# Patient Record
Sex: MALE | Race: WHITE | NOT HISPANIC OR LATINO | Employment: UNEMPLOYED | URBAN - METROPOLITAN AREA
[De-identification: names, ages, dates, MRNs, and addresses within clinical notes are randomized per-mention and may not be internally consistent; named-entity substitution may affect disease eponyms.]

---

## 2020-01-10 ENCOUNTER — OFFICE VISIT (OUTPATIENT)
Dept: FAMILY MEDICINE | Facility: CLINIC | Age: 45
End: 2020-01-10
Payer: MEDICAID

## 2020-01-10 VITALS
WEIGHT: 114.81 LBS | OXYGEN SATURATION: 98 % | HEART RATE: 111 BPM | DIASTOLIC BLOOD PRESSURE: 82 MMHG | TEMPERATURE: 98 F | SYSTOLIC BLOOD PRESSURE: 138 MMHG

## 2020-01-10 DIAGNOSIS — Z23 NEED FOR DIPHTHERIA-TETANUS-PERTUSSIS (TDAP) VACCINE: ICD-10-CM

## 2020-01-10 DIAGNOSIS — R03.0 ELEVATED BLOOD PRESSURE READING IN OFFICE WITHOUT DIAGNOSIS OF HYPERTENSION: ICD-10-CM

## 2020-01-10 DIAGNOSIS — E11.9 TYPE 2 DIABETES MELLITUS WITHOUT COMPLICATION, UNSPECIFIED WHETHER LONG TERM INSULIN USE: Primary | ICD-10-CM

## 2020-01-10 DIAGNOSIS — Z13.220 ENCOUNTER FOR LIPID SCREENING FOR CARDIOVASCULAR DISEASE: ICD-10-CM

## 2020-01-10 DIAGNOSIS — Z13.6 ENCOUNTER FOR LIPID SCREENING FOR CARDIOVASCULAR DISEASE: ICD-10-CM

## 2020-01-10 PROBLEM — F17.200 TOBACCO DEPENDENCE: Status: ACTIVE | Noted: 2020-01-10

## 2020-01-10 LAB — GLUCOSE SERPL-MCNC: 456 MG/DL (ref 70–110)

## 2020-01-10 PROCEDURE — 99203 PR OFFICE/OUTPT VISIT, NEW, LEVL III, 30-44 MIN: ICD-10-PCS | Mod: S$PBB,,, | Performed by: NURSE PRACTITIONER

## 2020-01-10 PROCEDURE — 90471 IMMUNIZATION ADMIN: CPT | Mod: PBBFAC | Performed by: NURSE PRACTITIONER

## 2020-01-10 PROCEDURE — 82948 REAGENT STRIP/BLOOD GLUCOSE: CPT | Mod: PBBFAC | Performed by: NURSE PRACTITIONER

## 2020-01-10 PROCEDURE — 99203 OFFICE O/P NEW LOW 30 MIN: CPT | Mod: S$PBB,,, | Performed by: NURSE PRACTITIONER

## 2020-01-10 PROCEDURE — 99204 OFFICE O/P NEW MOD 45 MIN: CPT | Mod: 25 | Performed by: NURSE PRACTITIONER

## 2020-01-10 PROCEDURE — G0463 HOSPITAL OUTPT CLINIC VISIT: HCPCS | Mod: 25

## 2020-01-10 RX ORDER — LANCETS
EACH MISCELLANEOUS
Qty: 100 EACH | Refills: 3 | Status: SHIPPED | OUTPATIENT
Start: 2020-01-10 | End: 2020-06-04 | Stop reason: SDUPTHER

## 2020-01-10 RX ORDER — BUPRENORPHINE AND NALOXONE 8; 2 MG/1; MG/1
1 FILM, SOLUBLE BUCCAL; SUBLINGUAL DAILY
COMMUNITY

## 2020-01-10 RX ORDER — INSULIN PUMP SYRINGE, 3 ML
EACH MISCELLANEOUS
Qty: 1 EACH | Refills: 0 | Status: SHIPPED | OUTPATIENT
Start: 2020-01-10 | End: 2020-06-04

## 2020-01-10 RX ORDER — METFORMIN HYDROCHLORIDE 500 MG/1
500 TABLET ORAL 2 TIMES DAILY WITH MEALS
Qty: 60 TABLET | Refills: 3 | Status: SHIPPED | OUTPATIENT
Start: 2020-01-10 | End: 2020-01-24 | Stop reason: SDUPTHER

## 2020-01-10 NOTE — PROGRESS NOTES
"    SUBJECTIVE:      Patient ID: Sergio Porter is a 44 y.o. male.    Chief Complaint: Establish Care and Diabetes    Pt presents as a new pt for diabetes. He seems to be a poor historian. He reports he "diagnosed himself" and was never diagnosed by a medical doctor. He reports not having been to a PCP in over 20 years. Incidentally, in reviewing his [facility] chart, he has been to the hospital twice- once for dehydration and another for uncontrolled DMII when he was prescribed Metformin. He has not been taking the Metformin and never filled it. The ER notes from his visit in 2019 reports he has taken his mother's Metformin irregularly in the past. Reports he does not get GI symptoms with the Metformin. He has a blood sugar monitor at home but does not check his blood sugar regularly. Reports the last time he checked his blood sugar was 2 weeks ago and it was "around 200." He reports drinking an energy drink this morning and many sugary sodas throughout the day. He does not drink water. He does report polydipsia, but not polyphagia or polyuria. Otherwise, he reports no medical history with his only medication being suboxone which he "takes from his friends." Reports he was originally prescribed it through a clinic but he stopped going. He smokes marijuana occasionally. He is refusing the flu and pneumonia vaccines today but amenable to receiving the Tdap today with his occupation as a . Denies CP, SOB, wheezing, nausea, vomiting, diarrhea, constipation, numbness, weakness, dizziness, palpitations, fevers, or any other concerns at this time.      History reviewed. No pertinent surgical history.  Family History   Problem Relation Age of Onset    Diabetes Mother     Diabetes Father       Social History     Socioeconomic History    Marital status:      Spouse name: Not on file    Number of children: 2    Years of education: Not on file    Highest education level: Not on file   Occupational History "    Occupation:    Social Needs    Financial resource strain: Not on file    Food insecurity:     Worry: Not on file     Inability: Not on file    Transportation needs:     Medical: Not on file     Non-medical: Not on file   Tobacco Use    Smoking status: Current Every Day Smoker     Packs/day: 1.00     Years: 28.00     Pack years: 28.00     Types: Cigarettes    Smokeless tobacco: Never Used   Substance and Sexual Activity    Alcohol use: Not Currently    Drug use: Yes     Types: Marijuana     Comment: occasionally    Sexual activity: Not Currently   Lifestyle    Physical activity:     Days per week: Not on file     Minutes per session: Not on file    Stress: Not on file   Relationships    Social connections:     Talks on phone: Not on file     Gets together: Not on file     Attends Mu-ism service: Not on file     Active member of club or organization: Not on file     Attends meetings of clubs or organizations: Not on file     Relationship status: Not on file   Other Topics Concern    Not on file   Social History Narrative    Not on file     Current Outpatient Medications   Medication Sig Dispense Refill    buprenorphine-naloxone (SUBOXONE) 8-2 mg Film Place under the tongue once daily.      blood sugar diagnostic Strp To check BG 1 times daily, to use with insurance preferred meter 100 strip 3    blood-glucose meter kit To check BG 1 time daily, to use with insurance preferred meter 1 each 0    lancets Misc To check BG 1 times daily, to use with insurance preferred meter 100 each 3    metFORMIN (GLUCOPHAGE) 500 MG tablet Take 1 tablet (500 mg total) by mouth 2 (two) times daily with meals. 60 tablet 3     No current facility-administered medications for this visit.      Review of patient's allergies indicates:  No Known Allergies   History reviewed. No pertinent past medical history.  History reviewed. No pertinent surgical history.    Review of Systems   Constitutional: Negative  for activity change, appetite change, chills, diaphoresis, fatigue, fever and unexpected weight change.   HENT: Negative for congestion, ear pain, mouth sores, nosebleeds, postnasal drip, rhinorrhea, sinus pressure, sinus pain, sneezing, sore throat and trouble swallowing.    Eyes: Negative for pain and visual disturbance.   Respiratory: Negative for apnea, cough, chest tightness, shortness of breath, wheezing and stridor.    Cardiovascular: Negative for chest pain, palpitations and leg swelling.   Gastrointestinal: Negative for abdominal pain, blood in stool, constipation, diarrhea, nausea and vomiting.   Endocrine: Positive for polydipsia. Negative for polyphagia and polyuria.   Genitourinary: Negative for decreased urine volume, difficulty urinating, dysuria, flank pain, frequency, hematuria and urgency.   Musculoskeletal: Negative for arthralgias, myalgias and neck stiffness.   Skin: Negative for color change, rash and wound.   Neurological: Negative for dizziness, tremors, seizures, syncope, weakness, numbness and headaches.   Hematological: Negative for adenopathy.   Psychiatric/Behavioral: Negative for confusion, dysphoric mood, sleep disturbance and suicidal ideas. The patient is not nervous/anxious.       OBJECTIVE:      Vitals:    01/10/20 1023 01/10/20 1115   BP: (!) 138/98 138/82   BP Location: Left arm Left arm   Patient Position: Sitting Sitting   BP Method: Medium (Manual)    Pulse: (!) 111    Temp: 97.8 °F (36.6 °C)    TempSrc: Oral    SpO2: 98%    Weight: 52.1 kg (114 lb 12.8 oz)      Physical Exam   Constitutional: He is oriented to person, place, and time. Vital signs are normal. He appears well-developed and well-nourished. No distress.   HENT:   Head: Normocephalic and atraumatic.   Right Ear: Hearing, tympanic membrane, external ear and ear canal normal.   Left Ear: Hearing, tympanic membrane, external ear and ear canal normal.   Nose: Nose normal. No mucosal edema or rhinorrhea.    Mouth/Throat: Uvula is midline, oropharynx is clear and moist and mucous membranes are normal. Abnormal dentition. Dental caries present. No oropharyngeal exudate, posterior oropharyngeal edema or posterior oropharyngeal erythema.   Eyes: Pupils are equal, round, and reactive to light. Conjunctivae, EOM and lids are normal. Right eye exhibits no discharge. Left eye exhibits no discharge. No scleral icterus.   Neck: Trachea normal, normal range of motion, full passive range of motion without pain and phonation normal. Neck supple. No tracheal deviation present. No thyromegaly present.   Cardiovascular: Normal rate, regular rhythm, normal heart sounds, intact distal pulses and normal pulses. Exam reveals no gallop and no friction rub.   No murmur heard.  Pulmonary/Chest: Effort normal and breath sounds normal. No stridor. No respiratory distress. He has no decreased breath sounds. He has no wheezes. He has no rhonchi. He has no rales.   Abdominal: Soft. Normal appearance and bowel sounds are normal. There is no tenderness.   Musculoskeletal: Normal range of motion. He exhibits no edema.   Lymphadenopathy:     He has no cervical adenopathy.        Right: No supraclavicular adenopathy present.        Left: No supraclavicular adenopathy present.   Neurological: He is alert and oriented to person, place, and time.   Skin: Skin is warm, dry and intact. Capillary refill takes less than 2 seconds. No rash noted. He is not diaphoretic.   Psychiatric: He has a normal mood and affect. His speech is normal and behavior is normal. Judgment and thought content normal. Cognition and memory are normal. He expresses no suicidal plans.   Vitals reviewed.     Assessment:       1. Type 2 diabetes mellitus without complication, unspecified whether long term insulin use    2. Encounter for lipid screening for cardiovascular disease    3. Need for diphtheria-tetanus-pertussis (Tdap) vaccine        Plan:       Type 2 diabetes mellitus  without complication, unspecified whether long term insulin use  Blood sugar is elevated today in the office. He has been off his Metformin for an unknown amount of time. He is neurologically intact with no alarm symptoms present. Prescribed new diabetic supplies for him today instructing him to take fasting blood glucose daily before breakfast and record on the blood glucose log given to him today. He is to bring this log with him on his next visit. Instructed to start Metformin today and take twice daily every day compliantly. Instructed on diet - avoid white foods (sugar, potatoes, rice, sweets, breads) and no more energy drinks or sugary sodas. Instructed on one diet soda in the morning and the rest water or diet fluids. Ordered labs to be completed just prior to his next visit in 2 weeks. He was instructed to hydrate very well over this weekend and go to ER if he begins to feel bad or has any urgent symptoms. Verbalized understanding. F/U in 2 weeks.  -     POCT Glucose, Reagent Strip  -     CBC auto differential; Future; Expected date: 01/10/2020  -     Comprehensive metabolic panel; Future; Expected date: 01/10/2020  -     Microalbumin/creatinine urine ratio; Future; Expected date: 01/10/2020  -     blood-glucose meter kit; To check BG 1 time daily, to use with insurance preferred meter  Dispense: 1 each; Refill: 0  -     lancets Misc; To check BG 1 times daily, to use with insurance preferred meter  Dispense: 100 each; Refill: 3  -     blood sugar diagnostic Strp; To check BG 1 times daily, to use with insurance preferred meter  Dispense: 100 strip; Refill: 3  -     Hemoglobin A1c; Future; Expected date: 01/10/2020  -     metFORMIN (GLUCOPHAGE) 500 MG tablet; Take 1 tablet (500 mg total) by mouth 2 (two) times daily with meals.  Dispense: 60 tablet; Refill: 3    Elevated blood pressure reading in office without diagnosis of hypertension  His BP is above target at this office visit today. Discussed elevation  of BP with excess use of caffeine and tobacco use. Instructed on watching the amount of salt in the diet. Instructed on proper hydration with decaffeinated products, like Crystal Light or water. No more energy drinks. Will continue to monitor.    Encounter for lipid screening for cardiovascular disease  -     Lipid panel; Future; Expected date: 01/10/2020    Need for diphtheria-tetanus-pertussis (Tdap) vaccine  -     Tdap Vaccine        Follow up in about 2 weeks (around 1/24/2020) for F/U DM and lab review.      1/10/2020 Swapna Stafford, JOSE RAUL, FNP

## 2020-01-10 NOTE — PATIENT INSTRUCTIONS
Understanding Carbohydrates, Fats, and Protein  Food is a source of fuel and nourishment for your body. Its also a source of pleasure. Having diabetes doesnt mean you have to eat special foods or give up desserts. Instead, your dietitian can show you how to plan meals to suit your body. To start, learn how different foods affect blood sugar.  Carbohydrates  Carbohydrates are the main source of fuel for the body. Carbohydrates raise blood sugar. Many people think carbohydrates are only found in pasta or bread. But carbohydrates are actually in many kinds of foods:  · Sugars occur naturally in foods such as fruit, milk, honey, and molasses. Sugars can also be added to many foods, from cereals and yogurt to candy and desserts. Sugars raise blood sugar.  · Starches are found in bread, cereals, pasta, and dried beans. Theyre also found in corn, peas, potatoes, yam, acorn squash, and butternut squash. Starches also raise blood sugar.   · Fiber is found in foods such as vegetables, fruits, beans, and whole grains. Unlike other carbs, fiber isnt digested or absorbed. So it doesnt raise blood sugar. In fact, fiber can help keep blood sugar from rising too fast. It also helps keep blood cholesterol at a healthy level.  Did you know?  Even though carbohydrates raise blood sugar, its best to have some in every meal. They are an important part of a healthy diet.   Fat  Fat is an energy source that can be stored until needed. Fat does not raise blood sugar. However, it can raise blood cholesterol, increasing the risk of heart disease. Fat is also high in calories, which can cause weight gain. Not all types of fat are the same.  More Healthy:  · Monounsaturated fats are mostly found in vegetable oils, such as olive, canola, and peanut oils. They are also found in avocados and some nuts. Monounsaturated fats are healthy for your heart. Thats because they lower LDL (unhealthy) cholesterol.  · Polyunsaturated fats are mostly  found in vegetable oils, such as corn, safflower, and soybean oils. They are also found in some seeds, nuts, and fish. Polyunsaturated fats lower LDL (unhealthy) cholesterol. So, choosing them instead of saturated fats is healthy for your heart. Certain unsaturated fats can help lower triglycerides.   Less Healthy:  · Saturated fats are found in animal products, such as meat, poultry, whole milk, lard, and butter. Saturated fats raise LDL cholesterol and are not healthy for your heart.  · Hydrogenated oils and trans fats are formed when vegetable oils are processed into solid fats. They are found in many processed foods. Hydrogenated oils and trans fats raise LDL cholesterol and lower HDL (healthy) cholesterol. They are not healthy for your heart.  Protein  Protein helps the body build and repair muscle and other tissue. Protein has little or no effect on blood sugar. However, many foods that contain protein also contain saturated fat. By choosing low-fat protein sources, you can get the benefits of protein without the extra fat:  · Plant protein is found in dry beans and peas, nuts, and soy products, such as tofu and soymilk. These sources tend to be cholesterol-free and low in saturated fat.  · Animal protein is found in fish, poultry, meat, cheese, milk, and eggs. These contain cholesterol and can be high in saturated fat. Aim for lean, lower-fat choices.  Date Last Reviewed: 3/1/2016  © 3476-9049 [employer]. [address] [place] [address], [place] [address]. All rights reserved. This information is not intended as a substitute for professional medical care. Always follow your healthcare professional's instructions.

## 2020-01-17 ENCOUNTER — TELEPHONE (OUTPATIENT)
Dept: FAMILY MEDICINE | Facility: CLINIC | Age: 45
End: 2020-01-17

## 2020-01-21 ENCOUNTER — TELEPHONE (OUTPATIENT)
Dept: FAMILY MEDICINE | Facility: CLINIC | Age: 45
End: 2020-01-21

## 2020-01-21 NOTE — TELEPHONE ENCOUNTER
Tried to call pt to remind him of labs due before his appointment, however there was no answer and unable to leave message as mailbox was full.

## 2020-01-22 ENCOUNTER — LAB VISIT (OUTPATIENT)
Dept: LAB | Facility: HOSPITAL | Age: 45
End: 2020-01-22
Attending: NURSE PRACTITIONER
Payer: MEDICAID

## 2020-01-22 DIAGNOSIS — Z13.6 ENCOUNTER FOR LIPID SCREENING FOR CARDIOVASCULAR DISEASE: ICD-10-CM

## 2020-01-22 DIAGNOSIS — E11.9 TYPE 2 DIABETES MELLITUS WITHOUT COMPLICATION, UNSPECIFIED WHETHER LONG TERM INSULIN USE: ICD-10-CM

## 2020-01-22 DIAGNOSIS — Z13.220 ENCOUNTER FOR LIPID SCREENING FOR CARDIOVASCULAR DISEASE: ICD-10-CM

## 2020-01-22 LAB
ALBUMIN SERPL BCP-MCNC: 3.7 G/DL (ref 3.5–5.2)
ALBUMIN/CREAT UR: 9 UG/MG (ref 0–30)
ALP SERPL-CCNC: 109 U/L (ref 55–135)
ALT SERPL W/O P-5'-P-CCNC: 23 U/L (ref 10–44)
ANION GAP SERPL CALC-SCNC: 11 MMOL/L (ref 8–16)
AST SERPL-CCNC: 17 U/L (ref 10–40)
BASOPHILS # BLD AUTO: 0.05 K/UL (ref 0–0.2)
BASOPHILS NFR BLD: 0.6 % (ref 0–1.9)
BILIRUB SERPL-MCNC: 0.6 MG/DL (ref 0.1–1)
BUN SERPL-MCNC: 12 MG/DL (ref 6–20)
CALCIUM SERPL-MCNC: 9.7 MG/DL (ref 8.7–10.5)
CHLORIDE SERPL-SCNC: 97 MMOL/L (ref 95–110)
CHOLEST SERPL-MCNC: 212 MG/DL (ref 120–199)
CHOLEST/HDLC SERPL: 3.5 {RATIO} (ref 2–5)
CO2 SERPL-SCNC: 27 MMOL/L (ref 23–29)
CREAT SERPL-MCNC: 0.7 MG/DL (ref 0.5–1.4)
CREAT UR-MCNC: 52 MG/DL (ref 23–375)
DIFFERENTIAL METHOD: ABNORMAL
EOSINOPHIL # BLD AUTO: 0.2 K/UL (ref 0–0.5)
EOSINOPHIL NFR BLD: 2 % (ref 0–8)
ERYTHROCYTE [DISTWIDTH] IN BLOOD BY AUTOMATED COUNT: 11.9 % (ref 11.5–14.5)
EST. GFR  (AFRICAN AMERICAN): >60 ML/MIN/1.73 M^2
EST. GFR  (NON AFRICAN AMERICAN): >60 ML/MIN/1.73 M^2
ESTIMATED AVG GLUCOSE: 378 MG/DL (ref 68–131)
GLUCOSE SERPL-MCNC: 329 MG/DL (ref 70–110)
HBA1C MFR BLD HPLC: 14.8 % (ref 4.5–6.2)
HCT VFR BLD AUTO: 46.4 % (ref 40–54)
HDLC SERPL-MCNC: 61 MG/DL (ref 40–75)
HDLC SERPL: 28.8 % (ref 20–50)
HGB BLD-MCNC: 15.1 G/DL (ref 14–18)
IMM GRANULOCYTES # BLD AUTO: 0.02 K/UL (ref 0–0.04)
IMM GRANULOCYTES NFR BLD AUTO: 0.3 % (ref 0–0.5)
LDLC SERPL CALC-MCNC: 134.4 MG/DL (ref 63–159)
LYMPHOCYTES # BLD AUTO: 2.8 K/UL (ref 1–4.8)
LYMPHOCYTES NFR BLD: 35.4 % (ref 18–48)
MCH RBC QN AUTO: 29.1 PG (ref 27–31)
MCHC RBC AUTO-ENTMCNC: 32.5 G/DL (ref 32–36)
MCV RBC AUTO: 89 FL (ref 82–98)
MICROALBUMIN UR DL<=1MG/L-MCNC: 4.7 UG/ML
MONOCYTES # BLD AUTO: 0.6 K/UL (ref 0.3–1)
MONOCYTES NFR BLD: 7.1 % (ref 4–15)
NEUTROPHILS # BLD AUTO: 4.3 K/UL (ref 1.8–7.7)
NEUTROPHILS NFR BLD: 54.6 % (ref 38–73)
NONHDLC SERPL-MCNC: 151 MG/DL
NRBC BLD-RTO: 0 /100 WBC
PLATELET # BLD AUTO: 304 K/UL (ref 150–350)
PMV BLD AUTO: 8.9 FL (ref 9.2–12.9)
POTASSIUM SERPL-SCNC: 4.4 MMOL/L (ref 3.5–5.1)
PROT SERPL-MCNC: 7.5 G/DL (ref 6–8.4)
RBC # BLD AUTO: 5.19 M/UL (ref 4.6–6.2)
SODIUM SERPL-SCNC: 135 MMOL/L (ref 136–145)
TRIGL SERPL-MCNC: 83 MG/DL (ref 30–150)
WBC # BLD AUTO: 7.93 K/UL (ref 3.9–12.7)

## 2020-01-22 PROCEDURE — 83036 HEMOGLOBIN GLYCOSYLATED A1C: CPT

## 2020-01-22 PROCEDURE — 80061 LIPID PANEL: CPT

## 2020-01-22 PROCEDURE — 85025 COMPLETE CBC W/AUTO DIFF WBC: CPT

## 2020-01-22 PROCEDURE — 36415 COLL VENOUS BLD VENIPUNCTURE: CPT

## 2020-01-22 PROCEDURE — 80053 COMPREHEN METABOLIC PANEL: CPT

## 2020-01-22 PROCEDURE — 82043 UR ALBUMIN QUANTITATIVE: CPT

## 2020-01-24 ENCOUNTER — OFFICE VISIT (OUTPATIENT)
Dept: FAMILY MEDICINE | Facility: CLINIC | Age: 45
End: 2020-01-24
Payer: MEDICAID

## 2020-01-24 VITALS
HEIGHT: 67 IN | OXYGEN SATURATION: 96 % | TEMPERATURE: 98 F | WEIGHT: 116.19 LBS | SYSTOLIC BLOOD PRESSURE: 128 MMHG | BODY MASS INDEX: 18.24 KG/M2 | HEART RATE: 86 BPM | DIASTOLIC BLOOD PRESSURE: 78 MMHG

## 2020-01-24 DIAGNOSIS — E11.9 TYPE 2 DIABETES MELLITUS WITHOUT COMPLICATION, UNSPECIFIED WHETHER LONG TERM INSULIN USE: Primary | ICD-10-CM

## 2020-01-24 DIAGNOSIS — E78.5 HYPERLIPIDEMIA, UNSPECIFIED HYPERLIPIDEMIA TYPE: ICD-10-CM

## 2020-01-24 DIAGNOSIS — R03.0 ELEVATED BLOOD PRESSURE READING IN OFFICE WITHOUT DIAGNOSIS OF HYPERTENSION: ICD-10-CM

## 2020-01-24 PROCEDURE — 99215 OFFICE O/P EST HI 40 MIN: CPT | Performed by: NURSE PRACTITIONER

## 2020-01-24 PROCEDURE — 99213 OFFICE O/P EST LOW 20 MIN: CPT | Mod: S$PBB,,, | Performed by: NURSE PRACTITIONER

## 2020-01-24 PROCEDURE — 99213 PR OFFICE/OUTPT VISIT, EST, LEVL III, 20-29 MIN: ICD-10-PCS | Mod: S$PBB,,, | Performed by: NURSE PRACTITIONER

## 2020-01-24 RX ORDER — INSULIN GLARGINE 100 [IU]/ML
10 INJECTION, SOLUTION SUBCUTANEOUS NIGHTLY
Qty: 1 BOX | Refills: 3 | Status: SHIPPED | OUTPATIENT
Start: 2020-01-24 | End: 2020-06-04

## 2020-01-24 RX ORDER — ATORVASTATIN CALCIUM 10 MG/1
10 TABLET, FILM COATED ORAL NIGHTLY
Qty: 90 TABLET | Refills: 0 | Status: SHIPPED | OUTPATIENT
Start: 2020-01-24 | End: 2020-06-04

## 2020-01-24 RX ORDER — BLOOD SUGAR DIAGNOSTIC
1 STRIP MISCELLANEOUS DAILY
Qty: 100 EACH | Refills: 2 | Status: SHIPPED | OUTPATIENT
Start: 2020-01-24 | End: 2020-06-04

## 2020-01-24 RX ORDER — METFORMIN HYDROCHLORIDE 500 MG/1
1000 TABLET ORAL 2 TIMES DAILY WITH MEALS
Qty: 60 TABLET | Refills: 3 | Status: SHIPPED | OUTPATIENT
Start: 2020-01-24 | End: 2020-06-04 | Stop reason: SDUPTHER

## 2020-01-24 NOTE — PROGRESS NOTES
SUBJECTIVE:      Patient ID: Sergio Porter is a 44 y.o. male.    Chief Complaint: Follow-up (2 weeks / lab review)    Pt presents for lab review and blood sugar review. Pt has been doing well since last visit. He seems to be very motivated to turn his health around. He has been compliant with his Metformin. He has been compliant with his blood sugar checks daily and then having been running mostly in the 250s-280s which is better than previously, with some outliers in the 300s still. He reports compliance with his diet, watching his salt intake to avoid HTN and watching his sugars and carbs. Denies GI side effects from his Metformin. We reviewed his labs today which showed minimal hyperlipidemia, hyperglycemia, and HgbA1c 14%. He reports he has been feeling a lot better since being more compliant with his treatment plan. Reports he has not had an eye exam in a while and has never had a foot exam. He is refusing his vaccinations at this time. Denies CP, SOB, wheezing, diarrhea, constipation, nausea, vomiting, dizziness, weakness, numbness, palpitations, fevers, or any other concerns at this time.      History reviewed. No pertinent surgical history.  Family History   Problem Relation Age of Onset    Diabetes Mother     Diabetes Father       Social History     Socioeconomic History    Marital status:      Spouse name: Not on file    Number of children: 2    Years of education: Not on file    Highest education level: Not on file   Occupational History    Occupation:    Social Needs    Financial resource strain: Not on file    Food insecurity:     Worry: Not on file     Inability: Not on file    Transportation needs:     Medical: Not on file     Non-medical: Not on file   Tobacco Use    Smoking status: Current Every Day Smoker     Packs/day: 1.00     Years: 28.00     Pack years: 28.00     Types: Cigarettes    Smokeless tobacco: Never Used   Substance and Sexual Activity     "Alcohol use: Not Currently    Drug use: Yes     Types: Marijuana     Comment: occasionally    Sexual activity: Not Currently   Lifestyle    Physical activity:     Days per week: Not on file     Minutes per session: Not on file    Stress: Not on file   Relationships    Social connections:     Talks on phone: Not on file     Gets together: Not on file     Attends Synagogue service: Not on file     Active member of club or organization: Not on file     Attends meetings of clubs or organizations: Not on file     Relationship status: Not on file   Other Topics Concern    Not on file   Social History Narrative    Not on file     Current Outpatient Medications   Medication Sig Dispense Refill    blood sugar diagnostic Strp To check BG 1 times daily, to use with insurance preferred meter 100 strip 3    blood-glucose meter kit To check BG 1 time daily, to use with insurance preferred meter 1 each 0    buprenorphine-naloxone (SUBOXONE) 8-2 mg Film Place under the tongue once daily.      lancets Misc To check BG 1 times daily, to use with insurance preferred meter 100 each 3    metFORMIN (GLUCOPHAGE) 500 MG tablet Take 2 tablets (1,000 mg total) by mouth 2 (two) times daily with meals. 60 tablet 3    atorvastatin (LIPITOR) 10 MG tablet Take 1 tablet (10 mg total) by mouth every evening. 90 tablet 0    insulin (LANTUS SOLOSTAR U-100 INSULIN) glargine 100 units/mL (3mL) SubQ pen Inject 10 Units into the skin every evening. 1 Box 3    pen needle, diabetic (BD ULTRA-FINE MICRO PEN NEEDLE) 32 gauge x 1/4" Ndle Inject 1 Device into the skin once daily. 100 each 2     No current facility-administered medications for this visit.      Review of patient's allergies indicates:  No Known Allergies   Past Medical History:   Diagnosis Date    Diabetes mellitus, type 2      History reviewed. No pertinent surgical history.    Review of Systems   Constitutional: Negative for activity change, appetite change, chills, fatigue, " "fever and unexpected weight change.   HENT: Negative for congestion, ear pain, mouth sores, nosebleeds, postnasal drip, rhinorrhea, sinus pressure, sinus pain, sneezing, sore throat and trouble swallowing.    Eyes: Negative for pain and visual disturbance.   Respiratory: Negative for apnea, cough, chest tightness, shortness of breath, wheezing and stridor.    Cardiovascular: Negative for chest pain, palpitations and leg swelling.   Gastrointestinal: Negative for abdominal pain, blood in stool, constipation, diarrhea, nausea and vomiting.   Genitourinary: Negative for dysuria, flank pain, frequency and hematuria.   Musculoskeletal: Negative for arthralgias, myalgias and neck stiffness.   Skin: Negative for color change, rash and wound.   Neurological: Negative for dizziness, tremors, seizures, syncope and headaches.   Hematological: Negative for adenopathy.   Psychiatric/Behavioral: Negative for confusion, sleep disturbance and suicidal ideas.      OBJECTIVE:      Vitals:    01/24/20 1015   BP: 128/78   BP Location: Left arm   Patient Position: Sitting   BP Method: Medium (Manual)   Pulse: 86   Temp: 97.8 °F (36.6 °C)   TempSrc: Oral   SpO2: 96%   Weight: 52.7 kg (116 lb 3.2 oz)   Height: 5' 7" (1.702 m)     Physical Exam   Constitutional: He is oriented to person, place, and time. Vital signs are normal. He appears well-developed and well-nourished. No distress.   HENT:   Head: Normocephalic and atraumatic.   Right Ear: Hearing and external ear normal.   Left Ear: Hearing and external ear normal.   Nose: Nose normal.   Mouth/Throat: Uvula is midline, oropharynx is clear and moist and mucous membranes are normal. Abnormal dentition. No oropharyngeal exudate.   Eyes: Pupils are equal, round, and reactive to light. Conjunctivae, EOM and lids are normal. Right eye exhibits no discharge. Left eye exhibits no discharge. No scleral icterus.   Neck: Trachea normal, normal range of motion, full passive range of motion " without pain and phonation normal. Neck supple. No tracheal deviation present.   Cardiovascular: Normal rate, regular rhythm, normal heart sounds, intact distal pulses and normal pulses. Exam reveals no gallop and no friction rub.   No murmur heard.  Pulmonary/Chest: Effort normal and breath sounds normal. No stridor. No respiratory distress. He has no decreased breath sounds. He has no wheezes. He has no rhonchi. He has no rales.   Abdominal: Soft. Normal appearance and bowel sounds are normal. There is no tenderness.   Musculoskeletal: Normal range of motion. He exhibits no edema.   Lymphadenopathy:     He has no cervical adenopathy.        Right: No supraclavicular adenopathy present.        Left: No supraclavicular adenopathy present.   Neurological: He is alert and oriented to person, place, and time.   Skin: Skin is warm, dry and intact. Capillary refill takes less than 2 seconds. No rash noted. He is not diaphoretic.   Psychiatric: He has a normal mood and affect. His speech is normal and behavior is normal. Judgment and thought content normal. Cognition and memory are normal. He expresses no suicidal plans.   Vitals reviewed.     Assessment:       1. Type 2 diabetes mellitus without complication, unspecified whether long term insulin use    2. Elevated blood pressure reading in office without diagnosis of hypertension    3. Hyperlipidemia, unspecified hyperlipidemia type        Plan:       Type 2 diabetes mellitus without complication, unspecified whether long term insulin use  Increasing his Metformin to 1000mg BID since he is tolerating well with no side effects reported. Also starting on Lantus 10units every evening. Instructed on symptoms of hyper/hypoglycemia and the interventions for both should they arise. He is deferring visit with pharmacist at this time which was recommended. Referrals placed for both podiatry and ophthalmology for eye and foot exam. Instructed to call in weekly with his blood  "sugars for further titration of his Lantus. F/U in   -     metFORMIN (GLUCOPHAGE) 500 MG tablet; Take 2 tablets (1,000 mg total) by mouth 2 (two) times daily with meals.  Dispense: 60 tablet; Refill: 3  -     atorvastatin (LIPITOR) 10 MG tablet; Take 1 tablet (10 mg total) by mouth every evening.  Dispense: 90 tablet; Refill: 0  -     insulin (LANTUS SOLOSTAR U-100 INSULIN) glargine 100 units/mL (3mL) SubQ pen; Inject 10 Units into the skin every evening.  Dispense: 1 Box; Refill: 3  -     pen needle, diabetic (BD ULTRA-FINE MICRO PEN NEEDLE) 32 gauge x 1/4" Ndle; Inject 1 Device into the skin once daily.  Dispense: 100 each; Refill: 2  -     Ambulatory Referral to Ophthalmology  -     Ambulatory referral to Podiatry    Elevated blood pressure reading in office without diagnosis of hypertension  BP controlled at this visit. Instructed to continue with proper hydration, with minimal caffeine use and minimal salt use. Continue to monitor.    Hyperlipidemia, unspecified hyperlipidemia type  He has a family history of heart disease. Reviewed his calculated 10-year risk of CVD which is 8.21% which is an increased risk at this time due to his cigarette smoking and diabetes. Will start Lipitor 10mg orally every evening. Instructed on decreasing amount of saturated fats in his diet and starting with an exercise program of approx 150 minutes of brisk activity per week. Instructed on side effect of myalgia and he will contact me if this occurs. Will draw labs in 3 months.  -     atorvastatin (LIPITOR) 10 MG tablet; Take 1 tablet (10 mg total) by mouth every evening.  Dispense: 90 tablet; Refill: 0        Follow up in about 3 months (around 4/24/2020) for F/U DM.      1/24/2020 Swapna Stafford, JOSE RAUL, FNP      "

## 2020-01-24 NOTE — PATIENT INSTRUCTIONS
"  Insulin: How to Use and Where to Inject     Injection sites in adults include the belly (abdomen), front of thighs, back of upper arms and upper buttocks.     Insulin is given with shots (injections) in the fatty layer under the skin (subcutaneous). Some people use an implanted device called an insulin pump, while others inject insulin using prefilled "pens." Your healthcare provider, nurse, diabetes educator, or others will give you instructions about using insulin. Make sure you follow all instructions about when and where you use it.  Where to inject your insulin  · Injecting insulin in the same area (like your belly) means that insulin is absorbed the same way.  · Insulin is most often injected in the abdominal fat. That is where it is absorbed most quickly.  · Change the injection site each time you give yourself insulin. This helps prevent problems.  · Have an organized way of moving from site to site.  · Leave at least 2 inches around your belly button (navel).  When to inject your insulin  · Make sure you follow your healthcare provider's instructions about when you should give yourself insulin.  · The timing of insulin injections with meals or snacks is very important.      Getting ready to inject insulin from a bottle  · Wash your hands. Use soap and warm water.  · Wipe the top of the insulin bottle (vial) with alcohol.  Preparing the insulin  · Pull back the plunger until the end of the plunger is even with the number of units of insulin you take. Always read the numbers of units of insulin at your eye level.   · Insert the needle into the top of the bottle. Hold the needle and bottle straight up and down. Make sure the needle is in the insulin. Then push the plunger in all the way, pushing air into the insulin.  · Turn the bottle and syringe upside down so that the bottle is on top.  · Pull back on the plunger until the end of the plunger is even with the number of units of insulin you take.  · Remove " the needle. Then tap the syringe with a fingertip to remove any air bubbles.  Injecting the insulin  · Gently pinch up about 1 inch of skin. Do not squeeze the skin.  · Insert the needle.  · Push in the plunger. Press until the syringe is empty. Let go of the skin. Then remove the needle. Dont rub the site after you remove the needle.  Disposing of the syringe  · Put the needle and syringe in a sharps container. And dont recap the needle.  · When the sharps container is full, put it into a garbage bag and secure the top. Label the bag needles or sharps.  · Call your local waste company to ask about removing the sharps container. You can also check with the [employer] Needle Disposal at 428-897-0257, www.safeneedledisposal.org.  Storing your insulin  · Keep unopened insulin bottles in the refrigerator. An open bottle can be stored at room temperature (such as on the kitchen counter). But dont let the insulin get too hot. Always keep it below 86°F (30°C). And never let it freeze!  · Always use insulin before the expiration date on the bottle. Throw  bottles away.  · Use insulin within 28 days of opening the bottle. After 28 days, throw it away. To remember, write the date you opened it on the bottle.  · When you travel, be sure to take all of your diabetes supplies. Put them in a bag made to protect insulin from heat and cold. Always keep them with you. If there is a delay or your suitcase is lost, you will have what you need.  · Never leave insulin in the car. It can get too hot or too cold.  Date Last Reviewed: 2016  © 5111-1742 [employer]. [address] [place] [address], [place] [address]. All rights reserved. This information is not intended as a substitute for professional medical care. Always follow your healthcare professional's instructions.

## 2020-01-26 NOTE — PROGRESS NOTES
Reviewed labs during visit. Metformin increased to 1000mg BID and started on Lantus 10units daily. He will call weekly with his fasting blood sugars for titration of insulin. Also started on Lipitor with increased 10-year calculated risk of CVD.

## 2020-06-04 ENCOUNTER — OFFICE VISIT (OUTPATIENT)
Dept: FAMILY MEDICINE | Facility: CLINIC | Age: 45
End: 2020-06-04
Payer: MEDICAID

## 2020-06-04 VITALS
SYSTOLIC BLOOD PRESSURE: 126 MMHG | BODY MASS INDEX: 17.97 KG/M2 | HEART RATE: 111 BPM | WEIGHT: 114.5 LBS | OXYGEN SATURATION: 92 % | HEIGHT: 67 IN | DIASTOLIC BLOOD PRESSURE: 58 MMHG | TEMPERATURE: 98 F

## 2020-06-04 DIAGNOSIS — E11.65 UNCONTROLLED TYPE 2 DIABETES MELLITUS WITH HYPERGLYCEMIA: Primary | ICD-10-CM

## 2020-06-04 PROCEDURE — 99213 PR OFFICE/OUTPT VISIT, EST, LEVL III, 20-29 MIN: ICD-10-PCS | Mod: S$PBB,,, | Performed by: NURSE PRACTITIONER

## 2020-06-04 PROCEDURE — 99213 OFFICE O/P EST LOW 20 MIN: CPT | Mod: S$PBB,,, | Performed by: NURSE PRACTITIONER

## 2020-06-04 PROCEDURE — 99214 OFFICE O/P EST MOD 30 MIN: CPT | Performed by: NURSE PRACTITIONER

## 2020-06-04 RX ORDER — LANCETS
EACH MISCELLANEOUS
Qty: 100 EACH | Refills: 3 | Status: ON HOLD | OUTPATIENT
Start: 2020-06-04 | End: 2022-09-16 | Stop reason: HOSPADM

## 2020-06-04 RX ORDER — METFORMIN HYDROCHLORIDE 500 MG/1
1000 TABLET ORAL 2 TIMES DAILY WITH MEALS
Qty: 60 TABLET | Refills: 3 | Status: SHIPPED | OUTPATIENT
Start: 2020-06-04 | End: 2021-07-09 | Stop reason: SDUPTHER

## 2020-06-04 RX ORDER — ATORVASTATIN CALCIUM 10 MG/1
10 TABLET, FILM COATED ORAL NIGHTLY
Qty: 90 TABLET | Refills: 2 | Status: SHIPPED | OUTPATIENT
Start: 2020-06-04 | End: 2021-07-09 | Stop reason: SDUPTHER

## 2020-06-04 NOTE — PROGRESS NOTES
SUBJECTIVE:      Patient ID: Sergio Porter is a 45 y.o. male.    Chief Complaint: Follow-up (DM )    Patient presents for follow-up of his diabetes.  His wife is on Facetime during the visit so she can hear and understand the plan of care.  Reports he has not been complying with care and his blood sugars are elevated with blood glucose monitor reading high.  He is unsure why he stopped treatment.  Reports he has taken his metformin 2 tablets once daily for the last 4 days.  Of note, he is supposed to be taking metformin 2 tablets twice daily.  He reports he stopped the Lantus insulin because it was burning him when he injected.  He has been using Levemir which was unused and found at his home.  For the last 4 days he has been taking 10 units of Levemir once nightly.  Reports he has been losing weight because he does not know what to eat for his diabetes and he does not like the food that he is presented with.  He does report blurry vision and polyuria since his blood sugars have been running high.  He does report weight loss.  He has been noncompliant with his monitoring regimen.  He has never seen a dietitian.  Referrals were placed 2 visits ago for ophthalmology and podiatry and he has not followed up on this. Denies CP, SOB, wheezing, fevers, nausea, vomiting, diarrhea, constipation, numbness, weakness, dizziness, palpitations, or any other concerns at this time.      Diabetes   He presents for his follow-up diabetic visit. He has type 2 diabetes mellitus. His disease course has been worsening. There are no hypoglycemic associated symptoms. Pertinent negatives for hypoglycemia include no confusion, dizziness, headaches, seizures or tremors. Associated symptoms include blurred vision, polyuria and weight loss. Pertinent negatives for diabetes include no chest pain, no fatigue, no foot paresthesias, no foot ulcerations, no polydipsia, no polyphagia, no visual change and no weakness. There are no hypoglycemic  complications. Symptoms are worsening. There are no diabetic complications. Risk factors for coronary artery disease include diabetes mellitus, family history, male sex and tobacco exposure. Current diabetic treatment includes insulin injections and oral agent (monotherapy). He is compliant with treatment none of the time. He is currently taking insulin at bedtime. Insulin injections are given by patient. Rotation sites for injection include the abdominal wall. His weight is decreasing steadily. He is following a generally unhealthy diet. When asked about meal planning, he reported none. He has not had a previous visit with a dietitian. He monitors blood glucose at home 1-2 x per day. There is no compliance with monitoring of blood glucose. His home blood glucose trend is increasing rapidly. An ACE inhibitor/angiotensin II receptor blocker is not being taken. He does not see a podiatrist.Eye exam is not current.       History reviewed. No pertinent surgical history.  Family History   Problem Relation Age of Onset    Diabetes Mother     Diabetes Father       Social History     Socioeconomic History    Marital status:      Spouse name: Not on file    Number of children: 2    Years of education: Not on file    Highest education level: Not on file   Occupational History    Occupation:    Social Needs    Financial resource strain: Not on file    Food insecurity:     Worry: Not on file     Inability: Not on file    Transportation needs:     Medical: Not on file     Non-medical: Not on file   Tobacco Use    Smoking status: Current Every Day Smoker     Packs/day: 1.00     Years: 28.00     Pack years: 28.00     Types: Cigarettes    Smokeless tobacco: Never Used   Substance and Sexual Activity    Alcohol use: Not Currently    Drug use: Yes     Types: Marijuana     Comment: occasionally    Sexual activity: Not Currently   Lifestyle    Physical activity:     Days per week: Not on file      "Minutes per session: Not on file    Stress: Not on file   Relationships    Social connections:     Talks on phone: Not on file     Gets together: Not on file     Attends Denominational service: Not on file     Active member of club or organization: Not on file     Attends meetings of clubs or organizations: Not on file     Relationship status: Not on file   Other Topics Concern    Not on file   Social History Narrative    Not on file     Current Outpatient Medications   Medication Sig Dispense Refill    buprenorphine-naloxone (SUBOXONE) 8-2 mg Film Place under the tongue once daily.      lancets Mis To check BG 1 times daily, to use with insurance preferred meter 100 each 3    metFORMIN (GLUCOPHAGE) 500 MG tablet Take 2 tablets (1,000 mg total) by mouth 2 (two) times daily with meals. 60 tablet 3    atorvastatin (LIPITOR) 10 MG tablet Take 1 tablet (10 mg total) by mouth every evening. 90 tablet 2    insulin detemir U-100 (LEVEMIR FLEXTOUCH U-100 INSULN) 100 unit/mL (3 mL) SubQ InPn pen Inject 10 Units into the skin every evening. 1 Box 3    pen needle, diabetic (NOVOFINE PLUS) 32 gauge x 1/6" Ndle 1 Device by Misc.(Non-Drug; Combo Route) route every evening. 100 each 3     No current facility-administered medications for this visit.      Review of patient's allergies indicates:  No Known Allergies   Past Medical History:   Diagnosis Date    Diabetes mellitus, type 2      History reviewed. No pertinent surgical history.    Review of Systems   Constitutional: Positive for weight loss. Negative for activity change, appetite change, chills, fatigue, fever and unexpected weight change.   HENT: Negative for congestion, ear pain, mouth sores, nosebleeds, postnasal drip, rhinorrhea, sinus pressure, sinus pain, sneezing, sore throat and trouble swallowing.    Eyes: Positive for blurred vision. Negative for pain and visual disturbance.   Respiratory: Negative for apnea, cough, chest tightness, shortness of breath, " "wheezing and stridor.    Cardiovascular: Negative for chest pain, palpitations and leg swelling.   Gastrointestinal: Negative for abdominal pain, blood in stool, constipation, diarrhea, nausea and vomiting.   Endocrine: Positive for polyuria. Negative for polydipsia and polyphagia.   Genitourinary: Negative for dysuria, flank pain, frequency and hematuria.   Musculoskeletal: Negative for arthralgias, myalgias and neck stiffness.   Skin: Negative for color change, rash and wound.   Neurological: Negative for dizziness, tremors, seizures, syncope, weakness, light-headedness, numbness and headaches.   Hematological: Negative for adenopathy.   Psychiatric/Behavioral: Negative for confusion, sleep disturbance and suicidal ideas.      OBJECTIVE:      Vitals:    06/04/20 1429   BP: (!) 126/58   BP Location: Left arm   Patient Position: Sitting   BP Method: Medium (Manual)   Pulse: (!) 111   Temp: 98.1 °F (36.7 °C)   TempSrc: Oral   SpO2: (!) 92%   Weight: 51.9 kg (114 lb 8 oz)   Height: 5' 7" (1.702 m)     Physical Exam   Constitutional: He is oriented to person, place, and time. He appears well-developed and well-nourished. No distress.   HENT:   Head: Normocephalic and atraumatic.   Right Ear: Hearing and external ear normal.   Left Ear: Hearing and external ear normal.   Nose: Nose normal.   Mouth/Throat: Uvula is midline, oropharynx is clear and moist and mucous membranes are normal. Abnormal dentition. No oropharyngeal exudate.   Eyes: Pupils are equal, round, and reactive to light. Conjunctivae, EOM and lids are normal. Right eye exhibits no discharge. Left eye exhibits no discharge. No scleral icterus.   Neck: Trachea normal, normal range of motion, full passive range of motion without pain and phonation normal. Neck supple. Carotid bruit is not present. No tracheal deviation present. No thyromegaly present.   Cardiovascular: Regular rhythm, normal heart sounds, intact distal pulses and normal pulses. Tachycardia " present. Exam reveals no gallop and no friction rub.   No murmur heard.  Pulmonary/Chest: Effort normal and breath sounds normal. No stridor. No respiratory distress. He has no decreased breath sounds. He has no wheezes. He has no rhonchi. He has no rales.   Abdominal: Soft. Normal appearance and bowel sounds are normal. There is no tenderness.   Musculoskeletal: Normal range of motion. He exhibits no edema.   Lymphadenopathy:     He has no cervical adenopathy.        Right: No supraclavicular adenopathy present.        Left: No supraclavicular adenopathy present.   Neurological: He is alert and oriented to person, place, and time.   Skin: Skin is warm, dry and intact. Capillary refill takes less than 2 seconds. No rash noted. He is not diaphoretic.   Psychiatric: He has a normal mood and affect. His speech is normal and behavior is normal. Judgment and thought content normal. Cognition and memory are normal. He expresses no suicidal plans.   Vitals reviewed.     Assessment:       1. Uncontrolled type 2 diabetes mellitus with hyperglycemia    2. BMI less than 19,adult        Plan:       Uncontrolled type 2 diabetes mellitus with hyperglycemia  He was instructed to maintain compliance with treatment regimen.  Metformin 2 tablets b.i.d..  Will continue on Levemir since he prefers this insulin- 10 units once nightly.  Instructed on compliance of monitoring regimen.  He will keep a log and contact me weekly with his fasting blood sugars so titration of his Levemir can take place.  He was reminded of the referrals placed to both Ophthalmology and Podiatry and he will make appointments with both.  He will return in 2 weeks with labs.  Instructed if at any time he feels bad in his blood sugars are high, he should present to the ER.  -     Ambulatory referral/consult to Nutrition Services; Future; Expected date: 06/11/2020  -     Comprehensive metabolic panel; Future; Expected date: 06/15/2020  -     Hemoglobin A1C; Future;  "Expected date: 06/15/2020  -     Lipid Panel; Future; Expected date: 06/15/2020  -     insulin detemir U-100 (LEVEMIR FLEXTOUCH U-100 INSULN) 100 unit/mL (3 mL) SubQ InPn pen; Inject 10 Units into the skin every evening.  Dispense: 1 Box; Refill: 3  -     metFORMIN (GLUCOPHAGE) 500 MG tablet; Take 2 tablets (1,000 mg total) by mouth 2 (two) times daily with meals.  Dispense: 60 tablet; Refill: 3  -     pen needle, diabetic (NOVOFINE PLUS) 32 gauge x 1/6" Ndle; 1 Device by Misc.(Non-Drug; Combo Route) route every evening.  Dispense: 100 each; Refill: 3  -     lancets Misc; To check BG 1 times daily, to use with insurance preferred meter  Dispense: 100 each; Refill: 3  -     atorvastatin (LIPITOR) 10 MG tablet; Take 1 tablet (10 mg total) by mouth every evening.  Dispense: 90 tablet; Refill: 2    BMI less than 19,adult  BMI is 17.  He is agreeable to go to nutrition for further education on diabetic diet and ensuring appropriate caloric intake.  -     Ambulatory referral/consult to Nutrition Services; Future; Expected date: 06/11/2020        Follow up in about 2 weeks (around 6/18/2020) for F/U DM, labs.      6/5/2020 JOSE RAUL Burleson, FNP      "

## 2020-06-04 NOTE — PATIENT INSTRUCTIONS
Understanding Carbohydrates, Fats, and Protein  Food is a source of fuel and nourishment for your body. Its also a source of pleasure. Having diabetes doesnt mean you have to eat special foods or give up desserts. Instead, your dietitian can show you how to plan meals to suit your body. To start, learn how different foods affect blood sugar.  Carbohydrates  Carbohydrates are the main source of fuel for the body. Carbohydrates raise blood sugar. Many people think carbohydrates are only found in pasta or bread. But carbohydrates are actually in many kinds of foods:  · Sugars occur naturally in foods such as fruit, milk, honey, and molasses. Sugars can also be added to many foods, from cereals and yogurt to candy and desserts. Sugars raise blood sugar.  · Starches are found in bread, cereals, pasta, and dried beans. Theyre also found in corn, peas, potatoes, yam, acorn squash, and butternut squash. Starches also raise blood sugar.   · Fiber is found in foods such as vegetables, fruits, beans, and whole grains. Unlike other carbs, fiber isnt digested or absorbed. So it doesnt raise blood sugar. In fact, fiber can help keep blood sugar from rising too fast. It also helps keep blood cholesterol at a healthy level.  Did you know?  Even though carbohydrates raise blood sugar, its best to have some in every meal. They are an important part of a healthy diet.   Fat  Fat is an energy source that can be stored until needed. Fat does not raise blood sugar. However, it can raise blood cholesterol, increasing the risk of heart disease. Fat is also high in calories, which can cause weight gain. Not all types of fat are the same.  More Healthy:  · Monounsaturated fats are mostly found in vegetable oils, such as olive, canola, and peanut oils. They are also found in avocados and some nuts. Monounsaturated fats are healthy for your heart. Thats because they lower LDL (unhealthy) cholesterol.  · Polyunsaturated fats are mostly  found in vegetable oils, such as corn, safflower, and soybean oils. They are also found in some seeds, nuts, and fish. Polyunsaturated fats lower LDL (unhealthy) cholesterol. So, choosing them instead of saturated fats is healthy for your heart. Certain unsaturated fats can help lower triglycerides.   Less Healthy:  · Saturated fats are found in animal products, such as meat, poultry, whole milk, lard, and butter. Saturated fats raise LDL cholesterol and are not healthy for your heart.  · Hydrogenated oils and trans fats are formed when vegetable oils are processed into solid fats. They are found in many processed foods. Hydrogenated oils and trans fats raise LDL cholesterol and lower HDL (healthy) cholesterol. They are not healthy for your heart.  Protein  Protein helps the body build and repair muscle and other tissue. Protein has little or no effect on blood sugar. However, many foods that contain protein also contain saturated fat. By choosing low-fat protein sources, you can get the benefits of protein without the extra fat:  · Plant protein is found in dry beans and peas, nuts, and soy products, such as tofu and soymilk. These sources tend to be cholesterol-free and low in saturated fat.  · Animal protein is found in fish, poultry, meat, cheese, milk, and eggs. These contain cholesterol and can be high in saturated fat. Aim for lean, lower-fat choices.  Date Last Reviewed: 3/1/2016  © 8795-3385 [employer]. [address] [place] [address]. All rights reserved. This information is not intended as a substitute for professional medical care. Always follow your healthcare professional's instructions.        Diet: Diabetes  Food is an important tool that you can use to control diabetes and stay healthy. Eating well-balanced meals in the correct amounts will help you control your blood glucose levels and prevent low blood sugar reactions. It will also help you reduce the health risks of  diabetes. There is no one specific diet that is right for everyone with diabetes. But there are general guidelines to follow. A registered dietitian (RD) will create a tailored diet approach thats just right for you. He or she will also help you plan healthy meals and snacks. If you have any questions, call your dietitian for advice.     Guidelines for success  Talk with your healthcare provider before starting a diabetes diet or weight loss program. If you haven't talked with a dietitian yet, ask your provider for a referral. The following guidelines can help you succeed:  · Select foods from the 6 food groups below. Your dietitian will help you find food choices within each group. He or she will also show you serving sizes and how many servings you can have at each meal.  ¨ Grains, beans, and starchy vegetables  ¨ Vegetables  ¨ Fruit  ¨ Milk or yogurt  ¨ Meat, poultry, fish, or tofu  ¨ Healthy fats  · Check your blood sugar levels as directed by your provider. Take any medicine as prescribed by your provider.  · Learn to read food labels and pick the right portion sizes.  · Eat only the amount of food in your meal plan. Eat about the same amount of food at regular times each day. Dont skip meals. Eat meals 4 to 5 hours apart, with snacks in between.  · Limit alcohol. It raises blood sugar levels. Drink water or calorie-free diet drinks that use safe sweeteners.  · Eat less fat to help lower your risk of heart disease. Use nonfat or low-fat dairy products and lean meats. Avoid fried foods. Use cooking oils that are unsaturated, such as olive, canola, or peanut oil.  · Talk with your dietitian about safe sugar substitutes.  · Avoid added salt. It can contribute to high blood pressure, which can cause heart disease. People with diabetes already have a risk of high blood pressure and heart disease.  · Stay at a healthy weight. If you need to lose weight, cut down on your portion sizes. But dont skip meals. Exercise  is an important part of any weight management program. Talk with your provider about an exercise program thats right for you.  · For more information about the best diet plan for you, talk with a registered dietitian (RD). To find an RD in your area, contact:  ¨ Academy of Nutrition and Dietetics www.eatright.org  ¨ The American Diabetes Association [phone] www.diabetes.org  Date Last Reviewed: 8/1/2016 © 2000-2017 [employer]. [address] [place] [address], [place] [address]. All rights reserved. This information is not intended as a substitute for professional medical care. Always follow your healthcare professional's instructions.

## 2020-06-12 ENCOUNTER — LAB VISIT (OUTPATIENT)
Dept: LAB | Facility: HOSPITAL | Age: 45
End: 2020-06-12
Attending: NURSE PRACTITIONER
Payer: MEDICAID

## 2020-06-12 DIAGNOSIS — E11.65 UNCONTROLLED TYPE 2 DIABETES MELLITUS WITH HYPERGLYCEMIA: ICD-10-CM

## 2020-06-12 LAB
ALBUMIN SERPL BCP-MCNC: 3.8 G/DL (ref 3.5–5.2)
ALP SERPL-CCNC: 87 U/L (ref 55–135)
ALT SERPL W/O P-5'-P-CCNC: 28 U/L (ref 10–44)
ANION GAP SERPL CALC-SCNC: 12 MMOL/L (ref 8–16)
AST SERPL-CCNC: 22 U/L (ref 10–40)
BILIRUB SERPL-MCNC: 0.7 MG/DL (ref 0.1–1)
BUN SERPL-MCNC: 22 MG/DL (ref 6–20)
CALCIUM SERPL-MCNC: 9 MG/DL (ref 8.7–10.5)
CHLORIDE SERPL-SCNC: 100 MMOL/L (ref 95–110)
CHOLEST SERPL-MCNC: 167 MG/DL (ref 120–199)
CHOLEST/HDLC SERPL: 2.7 {RATIO} (ref 2–5)
CO2 SERPL-SCNC: 27 MMOL/L (ref 23–29)
CREAT SERPL-MCNC: 0.7 MG/DL (ref 0.5–1.4)
EST. GFR  (AFRICAN AMERICAN): >60 ML/MIN/1.73 M^2
EST. GFR  (NON AFRICAN AMERICAN): >60 ML/MIN/1.73 M^2
ESTIMATED AVG GLUCOSE: 346 MG/DL (ref 68–131)
GLUCOSE SERPL-MCNC: 184 MG/DL (ref 70–110)
HBA1C MFR BLD HPLC: 13.7 % (ref 4.5–6.2)
HDLC SERPL-MCNC: 62 MG/DL (ref 40–75)
HDLC SERPL: 37.1 % (ref 20–50)
LDLC SERPL CALC-MCNC: 95 MG/DL (ref 63–159)
NONHDLC SERPL-MCNC: 105 MG/DL
POTASSIUM SERPL-SCNC: 3.7 MMOL/L (ref 3.5–5.1)
PROT SERPL-MCNC: 6.9 G/DL (ref 6–8.4)
SODIUM SERPL-SCNC: 139 MMOL/L (ref 136–145)
TRIGL SERPL-MCNC: 50 MG/DL (ref 30–150)

## 2020-06-12 PROCEDURE — 80061 LIPID PANEL: CPT

## 2020-06-12 PROCEDURE — 83036 HEMOGLOBIN GLYCOSYLATED A1C: CPT

## 2020-06-12 PROCEDURE — 80053 COMPREHEN METABOLIC PANEL: CPT

## 2020-06-12 PROCEDURE — 36415 COLL VENOUS BLD VENIPUNCTURE: CPT

## 2020-06-23 ENCOUNTER — TELEPHONE (OUTPATIENT)
Dept: FAMILY MEDICINE | Facility: CLINIC | Age: 45
End: 2020-06-23

## 2020-06-23 NOTE — PROGRESS NOTES
Please call patient. He no showed to his appointment this morning. Diabetes remains uncontrolled. What have his fasting blood sugars been running since he restarted his diabetes medications?

## 2020-06-23 NOTE — TELEPHONE ENCOUNTER
----- Message from MOISÉS Zuniga sent at 6/23/2020  9:51 AM CDT -----  Please call patient. He no showed to his appointment this morning. Diabetes remains uncontrolled. What have his fasting blood sugars been running since he restarted his diabetes medications?

## 2020-08-19 ENCOUNTER — TELEPHONE (OUTPATIENT)
Dept: FAMILY MEDICINE | Facility: CLINIC | Age: 45
End: 2020-08-19

## 2020-09-24 ENCOUNTER — HOSPITAL ENCOUNTER (EMERGENCY)
Facility: HOSPITAL | Age: 45
Discharge: HOME | End: 2020-09-25
Attending: EMERGENCY MEDICINE
Payer: MEDICAID

## 2020-09-24 VITALS
BODY MASS INDEX: 18.05 KG/M2 | HEART RATE: 121 BPM | SYSTOLIC BLOOD PRESSURE: 140 MMHG | TEMPERATURE: 98 F | OXYGEN SATURATION: 98 % | DIASTOLIC BLOOD PRESSURE: 87 MMHG | HEIGHT: 67 IN | WEIGHT: 115 LBS | RESPIRATION RATE: 20 BRPM

## 2020-09-24 DIAGNOSIS — L03.011: Primary | ICD-10-CM

## 2020-09-24 PROCEDURE — 99284 EMERGENCY DEPT VISIT MOD MDM: CPT | Mod: 25

## 2020-09-24 PROCEDURE — 96372 THER/PROPH/DIAG INJ SC/IM: CPT

## 2020-09-24 PROCEDURE — 10060 I&D ABSCESS SIMPLE/SINGLE: CPT

## 2020-09-24 RX ORDER — LIDOCAINE HYDROCHLORIDE 10 MG/ML
10 INJECTION INFILTRATION; PERINEURAL
Status: DISCONTINUED | OUTPATIENT
Start: 2020-09-24 | End: 2020-09-25 | Stop reason: HOSPADM

## 2020-09-25 PROCEDURE — 10060 I&D ABSCESS SIMPLE/SINGLE: CPT

## 2020-09-25 PROCEDURE — 25000003 PHARM REV CODE 250: Performed by: EMERGENCY MEDICINE

## 2020-09-25 RX ORDER — LEVOFLOXACIN 750 MG/1
750 TABLET ORAL DAILY
Qty: 5 TABLET | Refills: 0 | Status: SHIPPED | OUTPATIENT
Start: 2020-09-25 | End: 2020-09-30

## 2020-09-25 RX ORDER — DOXYCYCLINE HYCLATE 100 MG
100 TABLET ORAL
Status: COMPLETED | OUTPATIENT
Start: 2020-09-25 | End: 2020-09-25

## 2020-09-25 RX ORDER — HYDROCODONE BITARTRATE AND ACETAMINOPHEN 5; 325 MG/1; MG/1
1 TABLET ORAL EVERY 4 HOURS PRN
Qty: 12 TABLET | Refills: 0 | Status: SHIPPED | OUTPATIENT
Start: 2020-09-25 | End: 2020-09-27

## 2020-09-25 RX ORDER — DOXYCYCLINE 100 MG/1
100 CAPSULE ORAL 2 TIMES DAILY
Qty: 20 CAPSULE | Refills: 0 | Status: SHIPPED | OUTPATIENT
Start: 2020-09-25 | End: 2020-10-05

## 2020-09-25 RX ORDER — CLINDAMYCIN HYDROCHLORIDE 150 MG/1
300 CAPSULE ORAL 4 TIMES DAILY
Qty: 56 CAPSULE | Refills: 0 | Status: SHIPPED | OUTPATIENT
Start: 2020-09-25 | End: 2020-10-02

## 2020-09-25 RX ORDER — CLINDAMYCIN PHOSPHATE 150 MG/ML
600 INJECTION, SOLUTION INTRAVENOUS
Status: COMPLETED | OUTPATIENT
Start: 2020-09-25 | End: 2020-09-25

## 2020-09-25 RX ADMIN — LEVOFLOXACIN 750 MG: 500 TABLET, FILM COATED ORAL at 01:09

## 2020-09-25 RX ADMIN — DOXYCYCLINE HYCLATE 100 MG: 100 TABLET, COATED ORAL at 01:09

## 2020-09-25 RX ADMIN — CLINDAMYCIN PHOSPHATE 600 MG: 150 INJECTION, SOLUTION INTRAMUSCULAR; INTRAVENOUS at 01:09

## 2020-09-25 NOTE — FIRST PROVIDER EVALUATION
Emergency Department TeleTriage Encounter Note      CHIEF COMPLAINT    Chief Complaint   Patient presents with    Finger Injury     was peeling shrimp and stuck his right thumb 4 days ago, now painful and red       VITAL SIGNS   Initial Vitals [09/24/20 2137]   BP Pulse Resp Temp SpO2   (!) 140/87 (!) 121 20 98.3 °F (36.8 °C) 98 %      MAP       --            ALLERGIES    Review of patient's allergies indicates:  No Known Allergies    PROVIDER TRIAGE NOTE  This is a teletriage evaluation of a 45 y.o. male presenting to the ED with c/o right thumb pain and swelling.  Reports being punctured with shrimp horn 3 days ago. Initial orders will be placed and care will be transferred to an alternate provider when patient is roomed for a full evaluation. Any additional orders and the final disposition will be determined by that provider.         ORDERS  Labs Reviewed - No data to display    ED Orders (720h ago, onward)    Start Ordered     Status Ordering Provider    09/24/20 2148 09/24/20 2147  X-Ray Finger 2 or More Views Right  1 time imaging      Ordered HERNANDEZ LINDA            Virtual Visit Note: The provider triage portion of this emergency department evaluation and documentation was performed via [employer], a HIPAA-compliant telemedicine application, in concert with a tele-presenter in the room. A face to face patient evaluation with one of my colleagues will occur once the patient is placed in an emergency department room.      DISCLAIMER: This note was prepared with [employer] voice recognition transcription software. Garbled syntax, mangled pronouns, and other bizarre constructions may be attributed to that software system.

## 2020-09-25 NOTE — ED PROVIDER NOTES
Encounter Date: 9/24/2020       History     Chief Complaint   Patient presents with    Finger Injury     was peeling shrimp and stuck his right thumb 4 days ago, now painful and red     HPI   Patient is a 45-year-old man who presents emergency department for evaluation of constant, worsening right thumb pain and swelling.  Patient states he was peeling shrimp and stuck his right thumb 4 days ago with shrimp.  Denies fever.  Review of patient's allergies indicates:  No Known Allergies  Past Medical History:   Diagnosis Date    Diabetes mellitus, type 2      History reviewed. No pertinent surgical history.  Family History   Problem Relation Age of Onset    Diabetes Mother     Diabetes Father      Social History     Tobacco Use    Smoking status: Current Every Day Smoker     Packs/day: 1.00     Years: 28.00     Pack years: 28.00     Types: Cigarettes    Smokeless tobacco: Never Used   Substance Use Topics    Alcohol use: Not Currently    Drug use: Yes     Types: Marijuana     Comment: occasionally     Review of Systems   Constitutional: Positive for fever.   Respiratory: Positive for chest tightness.    Cardiovascular: Positive for chest pain.   Musculoskeletal:        Positive for right thumb pain and swelling.   Neurological: Negative for weakness.       Physical Exam     Initial Vitals [09/24/20 2137]   BP Pulse Resp Temp SpO2   (!) 140/87 (!) 121 20 98.3 °F (36.8 °C) 98 %      MAP       --         Physical Exam  Physical Exam   Nursing note and vitals reviewed.   Constitutional: Oriented to person, place, and time. Appears well-developed and well-nourished.   HENT:   Head: Normocephalic and atraumatic.   Eyes: EOM are normal.   Neck: Normal range of motion. Neck supple.   Cardiovascular: Normal rate.   Pulmonary/Chest: Effort normal. Clear BS b/l   Abdominal: Soft, Non tender, no distension.   Musculoskeletal:  Paronychia of the right thumb present with diffuse swelling with erythema of the distal thumb.  Normal range of motion.   Neurological:Alert and oriented to person, place, and time.   Psychiatric: Normal mood and affect. Behavior is normal.         ED Course   I & D - Incision and Drainage    Date/Time: 9/25/2020 1:07 AM  Location procedure was performed: [facility] EMERGENCY DEPARTMENT  Performed by: Samson Min MD  Authorized by: Samson Min MD   Consent Done: Yes  Consent: Verbal consent obtained.  Risks and benefits: risks, benefits and alternatives were discussed  Type: abscess  Body area: upper extremity  Location details: right thumb  Anesthesia: digital block    Anesthesia:  Local Anesthetic: lidocaine 1% without epinephrine  Anesthetic total: 8 mL  Patient sedated: no  Scalpel size: 11  Incision type: single straight  Complexity: simple  Drainage: pus  Drainage amount: moderate  Wound treatment: incision,  expression of material and  deloculation  Packing material: none  Complications: No  Patient tolerance: Patient tolerated the procedure well with no immediate complications        Labs Reviewed - No data to display       Imaging Results          X-Ray Finger 2 or More Views Right (Final result)  Result time 09/24/20 22:17:44    Final result by Morales Barr MD (09/24/20 22:17:44)                 Impression:      No acute displaced fracture or dislocation.      Electronically signed by: Morales Barr  Date:    09/24/2020  Time:    22:17             Narrative:    EXAMINATION:  XR FINGER 2 OR MORE VIEWS RIGHT    CLINICAL HISTORY:  Right thumb infection;    TECHNIQUE:  AP, lateral, oblique views of the right thumb.    COMPARISON:  None    FINDINGS:  No acute fracture or dislocation.  Joint spaces appear maintained.  No abnormal periosteal reaction.  No radiopaque foreign body.  Soft tissue swelling about the thumb phalanges identified.                                 Medical Decision Making:   History:   Old Medical Records: I decided to obtain old medical records.  Initial Assessment:    Patient is a 45-year-old man presented emergency department for evaluation of swelling, pain and redness of his right thumb 4 days after accidentally sticking his thumb with the spine of a shrimp he was peeling.  He denies any fever.  Examination reveals spontaneous drainage and abscess formation over the distal right thumb.  Patient is able to bend the thumb at the MCP and IP joint.  I doubt septic joint.  X-rays obtained showed no evidence of foreign body.  Digital block was performed and incision was made with the loculation and expression of purulence.  In the ED patient is given clindamycin, Levaquin and doxycycline and will be prescribed the same for home.  He is to return in 2 days for wound re-evaluation or sooner if he is not improving or is worsening.                             Clinical Impression:     ICD-10-CM ICD-9-CM   1. Abscess of fingernail on right hand  L03.011 681.02                          ED Disposition Condition    Discharge Stable        ED Prescriptions     Medication Sig Dispense Start Date End Date Auth. Provider    clindamycin (CLEOCIN) 150 MG capsule Take 2 capsules (300 mg total) by mouth 4 (four) times daily. for 7 days 56 capsule 9/25/2020 10/2/2020 Samson Min MD    doxycycline (VIBRAMYCIN) 100 MG Cap Take 1 capsule (100 mg total) by mouth 2 (two) times daily. for 10 days 20 capsule 9/25/2020 10/5/2020 Samson Min MD    levoFLOXacin (LEVAQUIN) 750 MG tablet Take 1 tablet (750 mg total) by mouth once daily. for 5 days 5 tablet 9/25/2020 9/30/2020 Samson Min MD    HYDROcodone-acetaminophen (NORCO) 5-325 mg per tablet Take 1 tablet by mouth every 4 (four) hours as needed. 12 tablet 9/25/2020 9/27/2020 Samson Min MD        Follow-up Information     Follow up With Specialties Details Why Contact Info    [facility] In 4 days  [address]  [address]  [facility] [address]  [phone]      [facility]  Medicine In 2 days For wound re-check or sooner if worsening [address] [place] [address] [id number] [phone]                                       Samson Min MD  09/25/20 0428

## 2020-11-16 ENCOUNTER — TELEPHONE (OUTPATIENT)
Dept: FAMILY MEDICINE | Facility: CLINIC | Age: 45
End: 2020-11-16

## 2020-12-02 ENCOUNTER — HOSPITAL ENCOUNTER (EMERGENCY)
Facility: HOSPITAL | Age: 45
Discharge: HOME | End: 2020-12-02
Attending: EMERGENCY MEDICINE
Payer: MEDICAID

## 2020-12-02 VITALS
HEIGHT: 67 IN | BODY MASS INDEX: 18.05 KG/M2 | HEART RATE: 89 BPM | DIASTOLIC BLOOD PRESSURE: 74 MMHG | WEIGHT: 115 LBS | TEMPERATURE: 98 F | RESPIRATION RATE: 15 BRPM | OXYGEN SATURATION: 100 % | SYSTOLIC BLOOD PRESSURE: 147 MMHG

## 2020-12-02 DIAGNOSIS — S49.90XA SHOULDER INJURY: ICD-10-CM

## 2020-12-02 PROCEDURE — 63600175 PHARM REV CODE 636 W HCPCS: Performed by: EMERGENCY MEDICINE

## 2020-12-02 PROCEDURE — 96372 THER/PROPH/DIAG INJ SC/IM: CPT | Mod: 59

## 2020-12-02 PROCEDURE — 99284 EMERGENCY DEPT VISIT MOD MDM: CPT | Mod: 25

## 2020-12-02 RX ORDER — PREDNISONE 20 MG/1
40 TABLET ORAL DAILY
Qty: 10 TABLET | Refills: 0 | Status: SHIPPED | OUTPATIENT
Start: 2020-12-02 | End: 2020-12-07

## 2020-12-02 RX ORDER — METHYLPREDNISOLONE SOD SUCC 125 MG
125 VIAL (EA) INJECTION
Status: COMPLETED | OUTPATIENT
Start: 2020-12-02 | End: 2020-12-02

## 2020-12-02 RX ADMIN — METHYLPREDNISOLONE SODIUM SUCCINATE 125 MG: 125 INJECTION, POWDER, FOR SOLUTION INTRAMUSCULAR; INTRAVENOUS at 08:12

## 2020-12-02 NOTE — ED NOTES
Adult Physical Assessment  LOC: Sergio Porter, 45 y.o. male verified via two identifiers.  The patient is awake, alert, oriented and speaking appropriately at this time.  APPEARANCE: Patient resting comfortably and appears to be in no acute distress at this time. Patient is clean and well groomed, patient's clothing is properly fastened.  SKIN:The skin is warm and dry, color consistent with ethnicity, patient has normal skin turgor and moist mucus membranes, skin intact, no breakdown or brusing noted.  MUSCULOSKELETAL: Patient moving all extremities well with exception to R shoulder, patient states he injured it shoveling dirt about 10 days ago and the pain has progressively gotten worse.   RESPIRATORY: Airway is open and patent, respirations are spontaneous, patient has a normal effort and rate, no accessory muscle use noted.  CARDIAC: Patient has a normal rate and rhythm, no periphreal edema noted in any extremity, capillary refill < 3 seconds in all extremities  ABDOMEN: Soft and non tender to palpation, no abdominal distention noted. Bowel sounds present in all four quadrants.  NEUROLOGIC: Eyes open spontaneously, behavior appropriate to situation, follows commands, facial expression symmetrical, bilateral hand grasp equal and even, purposeful motor response noted, normal sensation in all extremities when touched with a finger.

## 2020-12-03 NOTE — ED PROVIDER NOTES
Encounter Date: 12/2/2020       History     Chief Complaint   Patient presents with    Shoulder Pain     left shoulder for 10 days after shoveling dirt, denies fall      Patient here with reported left shoulder pain after shoveling dirt states he shoulder large polyps and will next morning with left shoulder pain pain is worse with VD duction he denies any previous injury to the shoulder he denies any numbness or tingling no other pain reported no neck pain no back pain no direct trauma to the arm it has been noted        Review of patient's allergies indicates:  No Known Allergies  Past Medical History:   Diagnosis Date    Diabetes mellitus, type 2      History reviewed. No pertinent surgical history.  Family History   Problem Relation Age of Onset    Diabetes Mother     Diabetes Father      Social History     Tobacco Use    Smoking status: Current Every Day Smoker     Packs/day: 1.00     Years: 28.00     Pack years: 28.00     Types: Cigarettes    Smokeless tobacco: Never Used   Substance Use Topics    Alcohol use: Not Currently    Drug use: Not Currently     Types: Marijuana     Comment: occasionally     Review of Systems   Musculoskeletal: Positive for arthralgias.   Neurological: Negative for weakness and numbness.       Physical Exam     Initial Vitals [12/02/20 1638]   BP Pulse Resp Temp SpO2   (!) 159/88 106 18 97.9 °F (36.6 °C) 100 %      MAP       --         Physical Exam    Constitutional: He appears well-developed and well-nourished. No distress.   HENT:   Head: Normocephalic and atraumatic.   Right Ear: External ear normal.   Left Ear: External ear normal.   Neck: Normal range of motion. Neck supple.   Cardiovascular: Normal rate, regular rhythm, S1 normal, S2 normal, normal heart sounds and intact distal pulses.   Pulmonary/Chest: Breath sounds normal.   Abdominal: Soft. Normal appearance and bowel sounds are normal. There is no abdominal tenderness.   Musculoskeletal: Normal range of motion.  Tenderness present. No edema.      Comments: Tenderness to palpation at the lateral aspect of the left deltoid full range of motion some discomfort   Neurological: He is alert and oriented to person, place, and time. GCS eye subscore is 4. GCS verbal subscore is 5. GCS motor subscore is 6.   Skin: Skin is warm and dry. Capillary refill takes less than 2 seconds. No rash noted.   Psychiatric: He has a normal mood and affect. His behavior is normal.         ED Course   Procedures  Labs Reviewed - No data to display       Imaging Results          X-Ray Shoulder 2 or More Views Left (Final result)  Result time 12/02/20 19:12:15   Procedure changed from X-Ray Shoulder Trauma Right     Final result by Jose Verma MD (12/02/20 19:12:15)                 Narrative:    XR SHOULDER COMPLETE 2 OR MORE VIEWS LEFT    CLINICAL HISTORY:  45 years Male pain    COMPARISON: None    FINDINGS: No acute fracture or malalignment of the left shoulder.  Joint spaces are maintained. Soft tissues are unremarkable.    IMPRESSION: No acute osseous abnormality.    Electronically Signed by Jose LAURENT on 12/2/2020 7:32 PM                               Medical Decision Making:   ED Management:  Radiographs shoulder revealed no evidence of fracture dislocation I have given patient a dose of Solu-Medrol emergency department place him on prednisone for 5 days recommend he rest the shoulder for moist heat return to the ER for any problems follow-up with [facility]                             Clinical Impression:       ICD-10-CM ICD-9-CM   1. Shoulder injury  S49.90XA 959.2                          ED Disposition Condition    Discharge Stable        ED Prescriptions     Medication Sig Dispense Start Date End Date Auth. Provider    predniSONE (DELTASONE) 20 MG tablet Take 2 tablets (40 mg total) by mouth once daily. for 5 days 10 tablet 12/2/2020 12/7/2020 Shorty Carcamo MD        Follow-up Information     Follow up With  Specialties Details Why Contact [facility]    [facility]  Call in 1 day for re-examination of your symptoms [address]  [place] [address]  [phone]                                         Shorty Carcamo MD  12/02/20 9256

## 2020-12-04 ENCOUNTER — OFFICE VISIT (OUTPATIENT)
Dept: FAMILY MEDICINE | Facility: CLINIC | Age: 45
End: 2020-12-04
Payer: MEDICAID

## 2020-12-04 VITALS
HEIGHT: 67 IN | RESPIRATION RATE: 18 BRPM | WEIGHT: 106.63 LBS | BODY MASS INDEX: 16.74 KG/M2 | DIASTOLIC BLOOD PRESSURE: 80 MMHG | HEART RATE: 111 BPM | TEMPERATURE: 97 F | SYSTOLIC BLOOD PRESSURE: 130 MMHG | OXYGEN SATURATION: 98 %

## 2020-12-04 DIAGNOSIS — E11.9 TYPE 2 DIABETES MELLITUS WITHOUT COMPLICATION, UNSPECIFIED WHETHER LONG TERM INSULIN USE: ICD-10-CM

## 2020-12-04 DIAGNOSIS — S46.912A MUSCLE STRAIN OF LEFT SHOULDER REGION, INITIAL ENCOUNTER: Primary | ICD-10-CM

## 2020-12-04 PROCEDURE — 99213 OFFICE O/P EST LOW 20 MIN: CPT | Mod: S$PBB,,, | Performed by: NURSE PRACTITIONER

## 2020-12-04 PROCEDURE — 99215 OFFICE O/P EST HI 40 MIN: CPT | Performed by: NURSE PRACTITIONER

## 2020-12-04 PROCEDURE — 99213 PR OFFICE/OUTPT VISIT, EST, LEVL III, 20-29 MIN: ICD-10-PCS | Mod: S$PBB,,, | Performed by: NURSE PRACTITIONER

## 2020-12-04 RX ORDER — KETOROLAC TROMETHAMINE 30 MG/ML
30 INJECTION, SOLUTION INTRAMUSCULAR; INTRAVENOUS
Status: COMPLETED | OUTPATIENT
Start: 2020-12-04 | End: 2020-12-04

## 2020-12-04 RX ORDER — METHOCARBAMOL 500 MG/1
500 TABLET, FILM COATED ORAL 3 TIMES DAILY PRN
Qty: 40 TABLET | Refills: 0 | Status: SHIPPED | OUTPATIENT
Start: 2020-12-04 | End: 2020-12-14

## 2020-12-04 RX ORDER — NAPROXEN 500 MG/1
500 TABLET ORAL 2 TIMES DAILY WITH MEALS
Qty: 14 TABLET | Refills: 0 | Status: SHIPPED | OUTPATIENT
Start: 2020-12-04 | End: 2020-12-11

## 2020-12-04 RX ADMIN — KETOROLAC TROMETHAMINE 30 MG: 30 INJECTION, SOLUTION INTRAMUSCULAR at 12:12

## 2020-12-04 NOTE — PROGRESS NOTES
SUBJECTIVE:      Patient ID: Sergio Porter is a 45 y.o. male.    Chief Complaint: Pain (left [id number] days )    Pt presents for L shoulder pain x 10 days. Reports he was shoveling dirt for his daughter about 2 weeks ago. The next day it started hurting near his L shoulder blade. He reports over the last couple days it progressed to the L arm and shoulder and he feels it in his neck and back. He went to the ER on 12/2/2020 and was given a SoluMedrol injection and a regimen of oral prednisone which he has taken. Imaging completed during the visit was negative for any acute bony findings. He also tried ice the last couple days after doing heat the first two weeks. He reports no relief with these interventions. He reports he is not taking any of his medications except for his Metformin. He stopped the insulin he was being prescribed because he reportedly ran out. Of note, his DMII was uncontrolled due to his noncompliance with follow up. Denies CP, SOB, wheezing, fevers, nausea, vomiting, diarrhea, constipation, numbness, weakness, dizziness, palpitations, or any other concerns at this time.    Shoulder Pain   The pain is present in the neck, back, left shoulder and left arm. This is a new problem. The current episode started 1 to 4 weeks ago. There has been no history of extremity trauma. The problem occurs constantly. The problem has been gradually worsening. The quality of the pain is described as aching. The pain is at a severity of 10/10. The pain is severe. Pertinent negatives include no fever, headaches, inability to bear weight, itching, joint locking, joint swelling, limited range of motion, numbness, stiffness, tingling or visual symptoms. The symptoms are aggravated by activity. He has tried heat, NSAIDS, cold and rest for the symptoms. The treatment provided no relief. Family history does not include arthritis. His past medical history is significant for diabetes. There is no history of Injuries to  Extremity or migraines.       History reviewed. No pertinent surgical history.  Family History   Problem Relation Age of Onset    Diabetes Mother     Diabetes Father       Social History     Socioeconomic History    Marital status:      Spouse name: Not on file    Number of children: 2    Years of education: Not on file    Highest education level: Not on file   Occupational History    Occupation:    Social Needs    Financial resource strain: Not on file    Food insecurity     Worry: Not on file     Inability: Not on file    Transportation needs     Medical: Not on file     Non-medical: Not on file   Tobacco Use    Smoking status: Current Every Day Smoker     Packs/day: 1.00     Years: 28.00     Pack years: 28.00     Types: Cigarettes    Smokeless tobacco: Never Used   Substance and Sexual Activity    Alcohol use: Not Currently    Drug use: Not Currently     Types: Marijuana     Comment: occasionally    Sexual activity: Not Currently   Lifestyle    Physical activity     Days per week: Not on file     Minutes per session: Not on file    Stress: Not on file   Relationships    Social connections     Talks on phone: Not on file     Gets together: Not on file     Attends Confucianist service: Not on file     Active member of club or organization: Not on file     Attends meetings of clubs or organizations: Not on file     Relationship status: Not on file   Other Topics Concern    Not on file   Social History Narrative    Not on file     Current Outpatient Medications   Medication Sig Dispense Refill    buprenorphine-naloxone (SUBOXONE) 8-2 mg Film Place under the tongue once daily.      metFORMIN (GLUCOPHAGE) 500 MG tablet Take 2 tablets (1,000 mg total) by mouth 2 (two) times daily with meals. 60 tablet 3    predniSONE (DELTASONE) 20 MG tablet Take 2 tablets (40 mg total) by mouth once daily. for 5 days 10 tablet 0    atorvastatin (LIPITOR) 10 MG tablet Take 1 tablet (10 mg total)  "by mouth every evening. (Patient not taking: Reported on 12/4/2020) 90 tablet 2    insulin detemir U-100 (LEVEMIR FLEXTOUCH U-100 INSULN) 100 unit/mL (3 mL) SubQ InPn pen Inject 10 Units into the skin every evening. (Patient not taking: Reported on 12/4/2020) 1 Box 3    lancets Misc To check BG 1 times daily, to use with insurance preferred meter (Patient not taking: Reported on 12/4/2020) 100 each 3    methocarbamoL (ROBAXIN) 500 MG Tab Take 1 tablet (500 mg total) by mouth 3 (three) times daily as needed (muscle spasm). 40 tablet 0    naproxen (NAPROSYN) 500 MG tablet Take 1 tablet (500 mg total) by mouth 2 (two) times daily with meals. for 7 days 14 tablet 0    pen needle, diabetic (NOVOFINE PLUS) 32 gauge x 1/6" Ndle 1 Device by Misc.(Non-Drug; Combo Route) route every evening. (Patient not taking: Reported on 12/4/2020) 100 each 3     No current facility-administered medications for this visit.      Review of patient's allergies indicates:  No Known Allergies   Past Medical History:   Diagnosis Date    Diabetes mellitus, type 2      History reviewed. No pertinent surgical history.    Review of Systems   Constitutional: Negative for activity change, appetite change, chills, fatigue, fever and unexpected weight change.   HENT: Negative for congestion, ear pain, mouth sores, nosebleeds, postnasal drip, rhinorrhea, sinus pressure, sinus pain, sneezing, sore throat and trouble swallowing.    Eyes: Negative for pain and visual disturbance.   Respiratory: Negative for apnea, cough, chest tightness, shortness of breath, wheezing and stridor.    Cardiovascular: Negative for chest pain, palpitations and leg swelling.   Gastrointestinal: Negative for abdominal pain, blood in stool, constipation, diarrhea, nausea and vomiting.   Genitourinary: Negative for dysuria, flank pain, frequency and hematuria.   Musculoskeletal: Positive for arthralgias. Negative for myalgias, neck stiffness and stiffness.   Skin: Negative " "for color change, itching, rash and wound.   Neurological: Negative for dizziness, tingling, tremors, seizures, syncope, weakness, light-headedness, numbness and headaches.   Hematological: Negative for adenopathy.   Psychiatric/Behavioral: Negative for confusion, sleep disturbance and suicidal ideas.      OBJECTIVE:      Vitals:    12/04/20 1136   BP: 130/80   BP Location: Right arm   Patient Position: Sitting   BP Method: Medium (Manual)   Pulse: (!) 111   Resp: 18   Temp: 97 °F (36.1 °C)   TempSrc: Temporal   SpO2: 98%   Weight: 48.4 kg (106 lb 9.6 oz)   Height: 5' 7" (1.702 m)     Physical Exam  Vitals signs reviewed.   Constitutional:       General: He is not in acute distress.     Appearance: Normal appearance. He is well-developed and underweight. He is not diaphoretic.   HENT:      Head: Normocephalic and atraumatic.      Right Ear: Hearing and external ear normal.      Left Ear: Hearing and external ear normal.      Nose: Nose normal.      Mouth/Throat:      Lips: Pink.      Mouth: Mucous membranes are moist.      Pharynx: Oropharynx is clear. Uvula midline. No pharyngeal swelling, oropharyngeal exudate or posterior oropharyngeal erythema.   Eyes:      General: Lids are normal. No scleral icterus.        Right eye: No discharge.         Left eye: No discharge.      Extraocular Movements: Extraocular movements intact.      Conjunctiva/sclera: Conjunctivae normal.      Pupils: Pupils are equal, round, and reactive to light.   Neck:      Musculoskeletal: Full passive range of motion without pain, normal range of motion and neck supple. No erythema or neck rigidity.      Thyroid: No thyroid mass or thyromegaly.      Vascular: No carotid bruit.      Trachea: Trachea and phonation normal. No tracheal deviation.   Cardiovascular:      Rate and Rhythm: Normal rate and regular rhythm.      Pulses: Normal pulses.      Heart sounds: Normal heart sounds. No murmur. No friction rub. No gallop.    Pulmonary:      Effort: " Pulmonary effort is normal. No respiratory distress.      Breath sounds: Normal breath sounds. No stridor. No decreased breath sounds, wheezing, rhonchi or rales.   Abdominal:      General: Bowel sounds are normal.      Palpations: Abdomen is soft.   Musculoskeletal: Normal range of motion.      Left shoulder: He exhibits tenderness, bony tenderness, pain and spasm. He exhibits normal range of motion, no swelling, no effusion, no crepitus, no deformity, no laceration, normal pulse and normal strength.      Right lower leg: No edema.      Left lower leg: No edema.      Comments: Full AROM and PROM noted on exam; pt reports pain is relieved when he raises his arm above his head or holds his arm up with the other arm   Skin:     General: Skin is warm and dry.      Capillary Refill: Capillary refill takes less than 2 seconds.      Findings: No rash.   Neurological:      General: No focal deficit present.      Mental Status: He is alert and oriented to person, place, and time.      Coordination: Coordination is intact.      Gait: Gait is intact.   Psychiatric:         Attention and Perception: Attention and perception normal.         Mood and Affect: Mood and affect normal.         Speech: Speech normal.         Behavior: Behavior normal. Behavior is cooperative.         Thought Content: Thought content normal. Thought content does not include suicidal plan.         Cognition and Memory: Cognition and memory normal.         Judgment: Judgment normal.        Assessment:       1. Muscle strain of left shoulder region, initial encounter    2. Type 2 diabetes mellitus without complication, unspecified whether long term insulin use        Plan:       Muscle strain of left shoulder region, initial encounter  Toradol injection given for pain relief in clinic. Robaxin PRN muscle spasm. Instructed on BID Naproxen x 7 days with meals. Instructed to take with food. Verbalized understanding.  -     ketorolac injection 30 mg  -      methocarbamoL (ROBAXIN) 500 MG Tab; Take 1 tablet (500 mg total) by mouth 3 (three) times daily as needed (muscle spasm).  Dispense: 40 tablet; Refill: 0  -     naproxen (NAPROSYN) 500 MG tablet; Take 1 tablet (500 mg total) by mouth 2 (two) times daily with meals. for 7 days  Dispense: 14 tablet; Refill: 0    Type 2 diabetes mellitus without complication, unspecified whether long term insulin use  Instructed to stop steroid because of his uncontrolled DMII. Rechecking labs. F/U in 2 weeks to discuss further treatment. Encouraged compliance with treatment plan.  -     Comprehensive Metabolic Panel; Future; Expected date: 12/04/2020  -     Hemoglobin A1C; Future; Expected date: 12/04/2020        Follow up in about 2 weeks (around 12/18/2020) for F/U DM, shoulder.      12/6/2020 JOSE RAUL Burleson, FNP

## 2020-12-04 NOTE — PATIENT INSTRUCTIONS
Shoulder Pain with Uncertain Cause  Shoulder pain can have many causes. Pain often comes from the structures that surround the shoulder joint. These are the joint capsule, ligaments, tendons, muscles, and bursa. Pain can also come from cartilage in the joint. Cartilage can become worn out or injured. Its important to know whats causing your pain so the healthcare provider can use the correct treatment. But sometimes its difficult to find the exact cause of shoulder pain. You may need to see a specialist (orthopedist). You may also need special tests such as a CT scan or MRI. The provider may need to use special tools to look inside the joint (arthroscopy).  Shoulder pain can be treated with a sling or a device that keeps your shoulder from moving. You can take an anti-inflammatory medicine such as ibuprofen to ease pain. You may need to do special shoulder exercises. Follow up with a specialist if the pain is severe or doesnt go away after a few weeks.  Home care  Follow these tips when caring for yourself at home:  · If a sling was given to you, leave it in place for the time advised by your healthcare provider. If you arent sure how long to wear it, ask for advice. If the sling becomes loose, adjust it so that your forearm is level with the ground. Your shoulder should feel well supported.  · Put an ice pack on the injured area for 20 minutes every 1 to 2 hours the first day. You can make your own ice pack by putting ice cubes in a plastic bag. Wrap the bag in a thin towel. Continue with ice packs 3 to 4 times a day for the next 2 days. Then use the pack as needed to ease pain and swelling.  · You may use acetaminophen or ibuprofen to control pain, unless another pain medicine was prescribed. If you have chronic liver or kidney disease, talk with your healthcare provider before using these medicines. Also talk with your provider if youve ever had a stomach ulcer or GI bleeding.  · Shoulder pain may seem  worse at night, when there is less to distract you from the pain. If you sleep on your side, try to keep weight off your painful shoulder. Propping pillows behind you may stop you from rolling over onto that shoulder during sleep.   · Shoulder and elbow joints can become stiff if left in a sling for too long. You should start range of motion exercises about 7 to 10 days after the injury. Talk with your provider to find out what type of exercises to do and how soon to start.  · You can take the sling off to shower or bathe.  Follow-up care  Follow up with your healthcare provider if you dont start to get better in the next 5 days.  When to seek medical advice  Call your healthcare provider right away if any of these occur:  · Pain or swelling gets worse or continues for more than a few days  · Your hand or fingers become cold, blue, numb, or tingly  · Large amount of bruising on your shoulder or upper arm  · Difficulty moving your hand or fingers  · Weakness in your hand or fingers  · Your shoulder becomes stiff  · It feels like your shoulder is popping out  · You are less able to do your daily activities  Date Last Reviewed: 10/1/2016  © 7928-3178 [employer]. [address] [place] [address], [place] [address]. All rights reserved. This information is not intended as a substitute for professional medical care. Always follow your healthcare professional's instructions.

## 2020-12-08 ENCOUNTER — PATIENT OUTREACH (OUTPATIENT)
Dept: EMERGENCY MEDICINE | Facility: HOSPITAL | Age: 45
End: 2020-12-08

## 2020-12-09 DIAGNOSIS — M25.511 ACUTE PAIN OF RIGHT SHOULDER: Primary | ICD-10-CM

## 2021-07-09 ENCOUNTER — CLINICAL SUPPORT (OUTPATIENT)
Dept: FAMILY MEDICINE | Facility: CLINIC | Age: 46
End: 2021-07-09
Attending: NURSE PRACTITIONER
Payer: MEDICAID

## 2021-07-09 ENCOUNTER — OFFICE VISIT (OUTPATIENT)
Dept: FAMILY MEDICINE | Facility: CLINIC | Age: 46
End: 2021-07-09
Payer: MEDICAID

## 2021-07-09 VITALS
HEIGHT: 67 IN | HEART RATE: 97 BPM | DIASTOLIC BLOOD PRESSURE: 86 MMHG | SYSTOLIC BLOOD PRESSURE: 122 MMHG | TEMPERATURE: 98 F | OXYGEN SATURATION: 97 % | BODY MASS INDEX: 17.28 KG/M2 | RESPIRATION RATE: 18 BRPM | WEIGHT: 110.13 LBS

## 2021-07-09 DIAGNOSIS — E11.65 UNCONTROLLED TYPE 2 DIABETES MELLITUS WITH HYPERGLYCEMIA: Primary | ICD-10-CM

## 2021-07-09 DIAGNOSIS — Z91.199 NONCOMPLIANCE: ICD-10-CM

## 2021-07-09 DIAGNOSIS — Z11.4 SCREENING FOR HIV WITHOUT PRESENCE OF RISK FACTORS: ICD-10-CM

## 2021-07-09 DIAGNOSIS — Z11.59 ENCOUNTER FOR HEPATITIS C SCREENING TEST FOR LOW RISK PATIENT: ICD-10-CM

## 2021-07-09 DIAGNOSIS — Z12.5 PROSTATE CANCER SCREENING: ICD-10-CM

## 2021-07-09 DIAGNOSIS — Z00.00 HEALTHCARE MAINTENANCE: ICD-10-CM

## 2021-07-09 DIAGNOSIS — E78.5 HYPERLIPIDEMIA, UNSPECIFIED HYPERLIPIDEMIA TYPE: ICD-10-CM

## 2021-07-09 DIAGNOSIS — E11.65 UNCONTROLLED TYPE 2 DIABETES MELLITUS WITH HYPERGLYCEMIA: ICD-10-CM

## 2021-07-09 LAB — HBA1C MFR BLD: ABNORMAL %

## 2021-07-09 PROCEDURE — 92228 DIABETIC EYE SCREENING PHOTO: ICD-10-PCS | Mod: 26,S$PBB,, | Performed by: OPTOMETRIST

## 2021-07-09 PROCEDURE — 92228 IMG RTA DETC/MNTR DS PHY/QHP: CPT | Mod: 26,S$PBB,, | Performed by: OPTOMETRIST

## 2021-07-09 PROCEDURE — 83036 HEMOGLOBIN GLYCOSYLATED A1C: CPT | Mod: PBBFAC | Performed by: NURSE PRACTITIONER

## 2021-07-09 PROCEDURE — 99214 PR OFFICE/OUTPT VISIT, EST, LEVL IV, 30-39 MIN: ICD-10-PCS | Mod: S$PBB,,, | Performed by: NURSE PRACTITIONER

## 2021-07-09 PROCEDURE — 99214 OFFICE O/P EST MOD 30 MIN: CPT | Mod: S$PBB,,, | Performed by: NURSE PRACTITIONER

## 2021-07-09 PROCEDURE — 99215 OFFICE O/P EST HI 40 MIN: CPT | Performed by: NURSE PRACTITIONER

## 2021-07-09 RX ORDER — METFORMIN HYDROCHLORIDE 500 MG/1
1000 TABLET ORAL 2 TIMES DAILY WITH MEALS
Qty: 120 TABLET | Refills: 3 | Status: ON HOLD | OUTPATIENT
Start: 2021-07-09 | End: 2022-09-18 | Stop reason: SDUPTHER

## 2021-07-09 RX ORDER — INSULIN GLARGINE 100 [IU]/ML
15 INJECTION, SOLUTION SUBCUTANEOUS DAILY
Qty: 1 BOX | Refills: 2 | Status: ON HOLD | OUTPATIENT
Start: 2021-07-09 | End: 2022-09-18

## 2021-07-09 RX ORDER — ATORVASTATIN CALCIUM 10 MG/1
10 TABLET, FILM COATED ORAL NIGHTLY
Qty: 90 TABLET | Refills: 2 | Status: ON HOLD | OUTPATIENT
Start: 2021-07-09 | End: 2022-09-18 | Stop reason: SDUPTHER

## 2021-08-24 ENCOUNTER — TELEPHONE (OUTPATIENT)
Dept: FAMILY MEDICINE | Facility: CLINIC | Age: 46
End: 2021-08-24

## 2022-09-15 ENCOUNTER — HOSPITAL ENCOUNTER (EMERGENCY)
Facility: HOSPITAL | Age: 47
Discharge: HOME | End: 2022-09-15
Attending: EMERGENCY MEDICINE
Payer: MEDICAID

## 2022-09-15 VITALS
RESPIRATION RATE: 20 BRPM | DIASTOLIC BLOOD PRESSURE: 86 MMHG | OXYGEN SATURATION: 99 % | HEART RATE: 130 BPM | HEIGHT: 67 IN | BODY MASS INDEX: 17.27 KG/M2 | WEIGHT: 110 LBS | SYSTOLIC BLOOD PRESSURE: 119 MMHG | TEMPERATURE: 99 F

## 2022-09-15 DIAGNOSIS — A41.9 SEPSIS: ICD-10-CM

## 2022-09-15 LAB
ALBUMIN SERPL BCP-MCNC: 3.6 G/DL (ref 3.5–5.2)
ALP SERPL-CCNC: 290 U/L (ref 55–135)
ALT SERPL W/O P-5'-P-CCNC: 38 U/L (ref 10–44)
ANION GAP SERPL CALC-SCNC: 11 MMOL/L (ref 8–16)
AST SERPL-CCNC: 27 U/L (ref 10–40)
BASOPHILS # BLD AUTO: 0.03 K/UL (ref 0–0.2)
BASOPHILS NFR BLD: 0.3 % (ref 0–1.9)
BILIRUB SERPL-MCNC: 0.7 MG/DL (ref 0.1–1)
BUN SERPL-MCNC: 22 MG/DL (ref 6–20)
CALCIUM SERPL-MCNC: 9.2 MG/DL (ref 8.7–10.5)
CHLORIDE SERPL-SCNC: 76 MMOL/L (ref 95–110)
CO2 SERPL-SCNC: 34 MMOL/L (ref 23–29)
CREAT SERPL-MCNC: 0.8 MG/DL (ref 0.5–1.4)
DIFFERENTIAL METHOD: ABNORMAL
EOSINOPHIL # BLD AUTO: 0 K/UL (ref 0–0.5)
EOSINOPHIL NFR BLD: 0.3 % (ref 0–8)
ERYTHROCYTE [DISTWIDTH] IN BLOOD BY AUTOMATED COUNT: 12.1 % (ref 11.5–14.5)
EST. GFR  (NO RACE VARIABLE): >60 ML/MIN/1.73 M^2
GLUCOSE SERPL-MCNC: 905 MG/DL (ref 70–110)
HCT VFR BLD AUTO: 41.9 % (ref 40–54)
HGB BLD-MCNC: 14 G/DL (ref 14–18)
IMM GRANULOCYTES # BLD AUTO: 0.05 K/UL (ref 0–0.04)
IMM GRANULOCYTES NFR BLD AUTO: 0.5 % (ref 0–0.5)
LACTATE SERPL-SCNC: 2 MMOL/L (ref 0.5–1.9)
LYMPHOCYTES # BLD AUTO: 2 K/UL (ref 1–4.8)
LYMPHOCYTES NFR BLD: 19 % (ref 18–48)
MCH RBC QN AUTO: 28.6 PG (ref 27–31)
MCHC RBC AUTO-ENTMCNC: 33.4 G/DL (ref 32–36)
MCV RBC AUTO: 86 FL (ref 82–98)
MONOCYTES # BLD AUTO: 0.8 K/UL (ref 0.3–1)
MONOCYTES NFR BLD: 7.4 % (ref 4–15)
NEUTROPHILS # BLD AUTO: 7.7 K/UL (ref 1.8–7.7)
NEUTROPHILS NFR BLD: 72.5 % (ref 38–73)
NRBC BLD-RTO: 0 /100 WBC
PLATELET # BLD AUTO: 459 K/UL (ref 150–450)
PMV BLD AUTO: 8.8 FL (ref 9.2–12.9)
POTASSIUM SERPL-SCNC: 4 MMOL/L (ref 3.5–5.1)
PROCALCITONIN SERPL IA-MCNC: 0.47 NG/ML (ref 0–0.5)
PROT SERPL-MCNC: 9.3 G/DL (ref 6–8.4)
RBC # BLD AUTO: 4.9 M/UL (ref 4.6–6.2)
SODIUM SERPL-SCNC: 121 MMOL/L (ref 136–145)
WBC # BLD AUTO: 10.58 K/UL (ref 3.9–12.7)

## 2022-09-15 PROCEDURE — 63600175 PHARM REV CODE 636 W HCPCS: Performed by: STUDENT IN AN ORGANIZED HEALTH CARE EDUCATION/TRAINING PROGRAM

## 2022-09-15 PROCEDURE — 87040 BLOOD CULTURE FOR BACTERIA: CPT | Performed by: EMERGENCY MEDICINE

## 2022-09-15 PROCEDURE — 84145 PROCALCITONIN (PCT): CPT | Performed by: EMERGENCY MEDICINE

## 2022-09-15 PROCEDURE — 83605 ASSAY OF LACTIC ACID: CPT | Performed by: EMERGENCY MEDICINE

## 2022-09-15 PROCEDURE — 80053 COMPREHEN METABOLIC PANEL: CPT | Performed by: EMERGENCY MEDICINE

## 2022-09-15 PROCEDURE — 93010 EKG 12-LEAD: ICD-10-PCS | Mod: ,,, | Performed by: SPECIALIST

## 2022-09-15 PROCEDURE — 99284 EMERGENCY DEPT VISIT MOD MDM: CPT | Mod: 25

## 2022-09-15 PROCEDURE — 63600175 PHARM REV CODE 636 W HCPCS: Performed by: EMERGENCY MEDICINE

## 2022-09-15 PROCEDURE — 93010 ELECTROCARDIOGRAM REPORT: CPT | Mod: ,,, | Performed by: SPECIALIST

## 2022-09-15 PROCEDURE — 93005 ELECTROCARDIOGRAM TRACING: CPT | Performed by: SPECIALIST

## 2022-09-15 PROCEDURE — 85025 COMPLETE CBC W/AUTO DIFF WBC: CPT | Performed by: EMERGENCY MEDICINE

## 2022-09-15 PROCEDURE — 25000003 PHARM REV CODE 250: Performed by: STUDENT IN AN ORGANIZED HEALTH CARE EDUCATION/TRAINING PROGRAM

## 2022-09-15 RX ORDER — VANCOMYCIN HCL IN 5 % DEXTROSE 1G/250ML
1000 PLASTIC BAG, INJECTION (ML) INTRAVENOUS ONCE
Status: DISCONTINUED | OUTPATIENT
Start: 2022-09-15 | End: 2022-09-15 | Stop reason: HOSPADM

## 2022-09-15 RX ORDER — DOXYCYCLINE 100 MG/1
100 CAPSULE ORAL 2 TIMES DAILY
Qty: 20 CAPSULE | Refills: 0 | Status: ON HOLD | OUTPATIENT
Start: 2022-09-15 | End: 2022-09-18 | Stop reason: HOSPADM

## 2022-09-15 RX ORDER — CEFEPIME HYDROCHLORIDE 1 G/50ML
1 INJECTION, SOLUTION INTRAVENOUS
Status: DISCONTINUED | OUTPATIENT
Start: 2022-09-15 | End: 2022-09-15 | Stop reason: HOSPADM

## 2022-09-15 NOTE — ED NOTES
Rec'd critical lab value from lab and passed results on to franc castro.  Glucose 905  lact. 2.0 and sodium 121. No new orders given at the time.

## 2022-09-15 NOTE — ED PROVIDER NOTES
Encounter Date: 9/15/2022       History     Chief Complaint   Patient presents with    Burn     Pt presents to ER with c/o burn to sacrum one month ago. Pt reports delayed healing since then. Redness and swelling to noted to sacrum/lower back area.      47-year-old male past medical history significant for type 2 diabetes presents emergency room today for evaluation of sacral wound.  Patient states that he suffered a burn to his sacrum 1 month ago that has progressively gotten worse.  He has pain and tenderness in this area.  Denies fevers or chills.    Review of patient's allergies indicates:  No Known Allergies  Past Medical History:   Diagnosis Date    Diabetes mellitus, type 2      No past surgical history on file.  Family History   Problem Relation Age of Onset    Diabetes Mother     Diabetes Father      Social History     Tobacco Use    Smoking status: Every Day     Packs/day: 1.00     Years: 28.00     Pack years: 28.00     Types: Cigarettes    Smokeless tobacco: Never   Substance Use Topics    Alcohol use: Not Currently    Drug use: Not Currently     Types: Marijuana     Comment: occasionally     Review of Systems   Constitutional:  Negative for chills, fatigue and fever.   HENT:  Negative for congestion, hearing loss, sore throat and trouble swallowing.    Eyes:  Negative for visual disturbance.   Respiratory:  Negative for cough, chest tightness and shortness of breath.    Cardiovascular:  Negative for chest pain.   Gastrointestinal:  Negative for abdominal pain and nausea.   Endocrine: Negative for polyuria.   Genitourinary:  Negative for difficulty urinating.   Musculoskeletal:  Negative for arthralgias and myalgias.   Skin:  Positive for wound. Negative for rash.   Neurological:  Negative for dizziness and headaches.   Psychiatric/Behavioral:  The patient is not nervous/anxious.      Physical Exam     Initial Vitals [09/15/22 1444]   BP Pulse Resp Temp SpO2   119/86 (!) 130 20 98.7 °F (37.1 °C) 99 %       MAP       --         Physical Exam    Nursing note and vitals reviewed.  Constitutional: He appears well-developed and well-nourished.   HENT:   Head: Normocephalic and atraumatic.   Eyes: Conjunctivae and EOM are normal.   Neck: Neck supple.   Cardiovascular:  Regular rhythm, normal heart sounds and intact distal pulses.           Tachycardic   Pulmonary/Chest: Breath sounds normal. No respiratory distress.   Musculoskeletal:         General: Normal range of motion.      Cervical back: Neck supple.     Neurological: He is alert and oriented to person, place, and time. GCS score is 15. GCS eye subscore is 4. GCS verbal subscore is 5. GCS motor subscore is 6.   Skin: Skin is dry. Capillary refill takes less than 2 seconds.   There is a sacral erythema and induration with what appears to be a small abscess.   Psychiatric: He has a normal mood and affect. His behavior is normal. Judgment and thought content normal.       ED Course   Procedures  Labs Reviewed   CBC W/ AUTO DIFFERENTIAL - Abnormal; Notable for the following components:       Result Value    Platelets 459 (*)     MPV 8.8 (*)     Immature Grans (Abs) 0.05 (*)     All other components within normal limits   COMPREHENSIVE METABOLIC PANEL - Abnormal; Notable for the following components:    Sodium 121 (*)     Chloride 76 (*)     CO2 34 (*)     Glucose 905 (*)     BUN 22 (*)     Total Protein 9.3 (*)     Alkaline Phosphatase 290 (*)     All other components within normal limits    Narrative:      glucose and sodium critical result(s) repeated. Called and verbal   readback obtained from [facility], RN by CHUNG 09/15/2022 16:50   LACTIC ACID, PLASMA - Abnormal; Notable for the following components:    Lactate (Lactic Acid) 2.0 (*)     All other components within normal limits    Narrative:     lactic acid critical result(s) repeated. Called and verbal readback   obtained from [facility], RN by CHUNG 09/15/2022 16:51   CULTURE, BLOOD   CULTURE,  BLOOD   PROCALCITONIN   LACTIC ACID, PLASMA   POCT GLUCOSE MONITORING CONTINUOUS          Imaging Results              X-Ray Chest AP Portable (Final result)  Result time 09/15/22 15:17:18      Final result by Briana Tang MD (09/15/22 15:17:18)                   Narrative:    XR CHEST 1 VIEW    CLINICAL HISTORY:  47 years Male Sepsis    COMPARISON: None    FINDINGS: Cardiomediastinal silhouette is within normal limits. Lungs are normally expanded with no airspace consolidation. No pleural effusion or pneumothorax. No acute osseous abnormality.    IMPRESSION: No acute pulmonary process.    Electronically signed by:  Briana Tang MD  9/15/2022 3:17 PM CDT Workstation: [id number]                                     Medications   lactated ringers bolus 1,497 mL (has no administration in time range)   vancomycin - pharmacy to dose (has no administration in time range)   cefepime in dextrose 5 % 1 gram/50 mL IVPB 1 g (1 g Intravenous Not Given 9/15/22 1651)   vancomycin in dextrose 5 % 1 gram/250 mL IVPB 1,000 mg (1,000 mg Intravenous Not Given 9/15/22 1654)   insulin regular injection 4 Units 0.04 mL (has no administration in time range)     Medical Decision Making:   Initial Assessment:   Tachycardic but overall nontoxic appearing.  ED Management:  47-year-old male presents emergency room for evaluation of poorly healing wound.  It appears he has developed cellulitis and potentially an abscess to the sacrum.  Patient labs demonstrate elevated blood glucose to 905, hypernatremia that corrects to 134.  He has an elevated lactic 2.0.  No leukocytosis.  I discussed the patient the need to stay, be admitted to the hospital for likely IV antibiotics and blood sugar management.  He elects to leave against medical advice.    The patient or guardian is clinically not intoxicated, free from distracting pain, appears to have intact insight, judgment, and reason and in my medical opinion has the capacity to make  decisions. The patient or guardian is also not under any duress to leave the hospital. In this scenario, it would be battery to subject a patient to treatment against his/her will. I have voiced my concerns for the patient's health given that a full evaluation and treatment had not occurred. I have discussed the need for continued evaluation to determine if their symptoms are caused by a condition that present risk of death or morbidity. Risks including but not limited to death, permanent disability, prolonged hospitalization, prolonged illness, were discussed. I discussed the specific benefits of additional treatment, as well as tried offering alternative options in hopes that the patient might be amenable to partial evaluation and treatment which would be medically beneficial to the patient. However, the patient or guardian declined my options and insisted on leaving. Because I have been unable to convince the patient to stay, I answered all of their questions about their condition and asked them to return to the ED as soon as possible to complete their evaluation, especially if their symptoms worsen or do not improve. I emphasized that leaving against medical advice does not preclude returning here for further evaluation. I asked the patient to return if they change their mind about the further evaluation and treatment. I strongly encouraged the patient to return to this Emergency Department or any Emergency Department at any time, particularly with worsening symptoms. Patient verbalized understanding of risks of leaving against medical advice and their ability to return to any Emergency Department for further evaluation.    Disposition:  Stable, AMA    I discuss this patient case with the cosigning physician, who agrees with diagnosis and plan of care. This note was written using the assistance of a dictation program and may contain grammatical errors.              ED Course as of 09/15/22 1728   Thu Sep 15,  2022   1548 EKG independently interpreted by me.  EKG demonstrates sinus tachycardia rate 120 beats per minute.  Normal intervals, rightward axis no acute ischemic changes.  No STEMI. [AN]      ED Course User Index  [AN] Galen Pace PA-C                 Clinical Impression:   Final diagnoses:  [A41.9] Sepsis        ED Disposition Condition    AMA Stable                Galen Pace PA-C  09/15/22 3849

## 2022-09-15 NOTE — ED NOTES
Pt refusing any further lab work or medications at this time. MD made aware. Pt wishing to leave AMA. Informed pt of all risks up to and including death.

## 2022-09-16 PROBLEM — E87.20 LACTIC ACIDOSIS: Status: ACTIVE | Noted: 2022-09-16

## 2022-09-16 PROBLEM — E46 MALNUTRITION: Status: ACTIVE | Noted: 2022-09-16

## 2022-09-16 PROBLEM — L02.91 ABSCESS: Status: ACTIVE | Noted: 2022-09-16

## 2022-09-16 PROBLEM — G93.40 ENCEPHALOPATHY: Status: ACTIVE | Noted: 2022-09-16

## 2022-09-16 PROBLEM — Z91.199 MEDICAL NON-COMPLIANCE: Status: ACTIVE | Noted: 2022-09-16

## 2022-09-16 PROBLEM — Z72.0 TOBACCO ABUSE: Status: ACTIVE | Noted: 2022-09-16

## 2022-09-16 PROBLEM — E87.1 HYPONATREMIA: Status: ACTIVE | Noted: 2022-09-16

## 2022-09-16 PROBLEM — F11.11 HISTORY OF OPIOID ABUSE: Status: ACTIVE | Noted: 2022-09-16

## 2022-09-16 PROBLEM — S31.000A SACRAL WOUND: Status: ACTIVE | Noted: 2022-09-16

## 2022-09-16 PROBLEM — E78.5 DYSLIPIDEMIA: Status: ACTIVE | Noted: 2022-09-16

## 2022-09-16 PROBLEM — E11.65 TYPE 2 DIABETES MELLITUS WITH HYPERGLYCEMIA: Status: ACTIVE | Noted: 2022-09-16

## 2022-09-17 PROBLEM — L02.212 CUTANEOUS ABSCESS OF BACK EXCLUDING BUTTOCKS: Status: ACTIVE | Noted: 2022-09-16

## 2022-09-20 LAB
BACTERIA BLD CULT: NORMAL
BACTERIA BLD CULT: NORMAL

## 2022-09-27 ENCOUNTER — OFFICE VISIT (OUTPATIENT)
Dept: FAMILY MEDICINE | Facility: CLINIC | Age: 47
End: 2022-09-27
Payer: MEDICAID

## 2022-09-27 VITALS
SYSTOLIC BLOOD PRESSURE: 138 MMHG | HEART RATE: 111 BPM | WEIGHT: 106.69 LBS | TEMPERATURE: 98 F | RESPIRATION RATE: 20 BRPM | HEIGHT: 67 IN | DIASTOLIC BLOOD PRESSURE: 82 MMHG | BODY MASS INDEX: 16.74 KG/M2 | OXYGEN SATURATION: 98 %

## 2022-09-27 DIAGNOSIS — E11.65 UNCONTROLLED TYPE 2 DIABETES MELLITUS WITH HYPERGLYCEMIA: ICD-10-CM

## 2022-09-27 DIAGNOSIS — Z51.89 VISIT FOR WOUND CHECK: Primary | ICD-10-CM

## 2022-09-27 DIAGNOSIS — E11.42 DIABETIC PERIPHERAL NEUROPATHY: ICD-10-CM

## 2022-09-27 PROCEDURE — 99214 OFFICE O/P EST MOD 30 MIN: CPT | Performed by: NURSE PRACTITIONER

## 2022-09-27 PROCEDURE — 1111F PR DISCHARGE MEDS RECONCILED W/ CURRENT OUTPATIENT MED LIST: ICD-10-PCS | Mod: CPTII,,, | Performed by: NURSE PRACTITIONER

## 2022-09-27 PROCEDURE — 1111F DSCHRG MED/CURRENT MED MERGE: CPT | Mod: CPTII,,, | Performed by: NURSE PRACTITIONER

## 2022-09-27 PROCEDURE — 3008F PR BODY MASS INDEX (BMI) DOCUMENTED: ICD-10-PCS | Mod: CPTII,,, | Performed by: NURSE PRACTITIONER

## 2022-09-27 PROCEDURE — 3046F HEMOGLOBIN A1C LEVEL >9.0%: CPT | Mod: CPTII,,, | Performed by: NURSE PRACTITIONER

## 2022-09-27 PROCEDURE — 99213 PR OFFICE/OUTPT VISIT, EST, LEVL III, 20-29 MIN: ICD-10-PCS | Mod: S$PBB,,, | Performed by: NURSE PRACTITIONER

## 2022-09-27 PROCEDURE — 3079F DIAST BP 80-89 MM HG: CPT | Mod: CPTII,,, | Performed by: NURSE PRACTITIONER

## 2022-09-27 PROCEDURE — 3075F PR MOST RECENT SYSTOLIC BLOOD PRESS GE 130-139MM HG: ICD-10-PCS | Mod: CPTII,,, | Performed by: NURSE PRACTITIONER

## 2022-09-27 PROCEDURE — 3075F SYST BP GE 130 - 139MM HG: CPT | Mod: CPTII,,, | Performed by: NURSE PRACTITIONER

## 2022-09-27 PROCEDURE — 99213 OFFICE O/P EST LOW 20 MIN: CPT | Mod: S$PBB,,, | Performed by: NURSE PRACTITIONER

## 2022-09-27 PROCEDURE — 3008F BODY MASS INDEX DOCD: CPT | Mod: CPTII,,, | Performed by: NURSE PRACTITIONER

## 2022-09-27 PROCEDURE — 3046F PR MOST RECENT HEMOGLOBIN A1C LEVEL > 9.0%: ICD-10-PCS | Mod: CPTII,,, | Performed by: NURSE PRACTITIONER

## 2022-09-27 PROCEDURE — 3079F PR MOST RECENT DIASTOLIC BLOOD PRESSURE 80-89 MM HG: ICD-10-PCS | Mod: CPTII,,, | Performed by: NURSE PRACTITIONER

## 2022-09-27 RX ORDER — PEN NEEDLE, DIABETIC 30 GX3/16"
NEEDLE, DISPOSABLE MISCELLANEOUS
Qty: 50 EACH | Refills: 1 | Status: SHIPPED | OUTPATIENT
Start: 2022-09-27 | End: 2022-11-03

## 2022-09-27 RX ORDER — INSULIN GLARGINE 100 [IU]/ML
25 INJECTION, SOLUTION SUBCUTANEOUS NIGHTLY
Qty: 6 ML | Refills: 1 | Status: SHIPPED | OUTPATIENT
Start: 2022-09-27 | End: 2022-11-03

## 2022-09-27 RX ORDER — GABAPENTIN 300 MG/1
300 CAPSULE ORAL 2 TIMES DAILY
Qty: 60 CAPSULE | Refills: 1 | Status: SHIPPED | OUTPATIENT
Start: 2022-09-27 | End: 2022-11-03 | Stop reason: SDUPTHER

## 2022-09-30 NOTE — PROGRESS NOTES
Patient ID: Sergio Porter is a 47 y.o. male.    Chief Complaint: Wound Check    Mr. Porter presents today for a wound check. He is a patient of [facility]. He has been going to wound care as ordered by PCP and he states he feels that the wound is improving. He denies fever, chills, night sweats or other infectious morbidity. He has been doing wound care to the best of his ability at home. He is also requesting refills on Insulin during this visit. He denies any other issues or complaints.     He also has a complaint of pins and needle feeling to bilateral lower extremities. He states he has felt  this more as of late and states it is beginning to affect the way he walks. He has a poor history of compliance with diabetes regimen. Diabetic education and importance of compliance was reinforced during this visit.    He denies any other issue or complaints during this visit.     Wound Check  He was originally treated more than 14 days ago. Previous treatment included wound cleansing or irrigation. There has been colored discharge from the wound. The redness has improved. There is no swelling present. The pain has improved.         Past Medical History:   Diagnosis Date    COVID-19     around june 2022, per patient/wife    Diabetes mellitus, type 2     Dyslipidemia     Opioid abuse      Past Surgical History:   Procedure Laterality Date    INCISION AND DRAINAGE OF ABSCESS N/A 9/16/2022    Procedure: INCISION AND DRAINAGE, ABSCESS- BACK;  Surgeon: Glenn Fraser MD;  Location: [facility];  Service: General;  Laterality: N/A;         Tobacco History:  reports that he has been smoking cigarettes. He has a 28.00 pack-year smoking history. He has never used smokeless tobacco.      Review of patient's allergies indicates:  No Known Allergies    Current Outpatient Medications:     atorvastatin (LIPITOR) 10 MG tablet, Take 1 tablet (10 mg total) by mouth every evening., Disp: 30 tablet, Rfl: 1    blood sugar  "diagnostic Strp, To check BG 3 times daily, to use with insurance preferred meter  Dx E 11.65, Disp: 100 each, Rfl: 1    blood-glucose meter kit, To check BG 3 times daily, to use with insurance preferred meter DX E 11.65, Disp: 1 each, Rfl: 1    buprenorphine-naloxone 8-2 mg (SUBOXONE) 8-2 mg, Place under the tongue once daily. Per patient, Disp: , Rfl:     lancets Misc, To check BG 3 times daily, to use with insurance preferred meter Dx E 11.65, Disp: 100 each, Rfl: 1    metFORMIN (GLUCOPHAGE) 500 MG tablet, Take 2 tablets (1,000 mg total) by mouth 2 (two) times daily with meals., Disp: 120 tablet, Rfl: 1    sodium hypochlorite 0.125% (DAKIN'S SOLUTION) external solution, Apply topically 2 (two) times daily., Disp: 473 mL, Rfl: 1    gabapentin (NEURONTIN) 300 MG capsule, Take 1 capsule (300 mg total) by mouth 2 (two) times daily., Disp: 60 capsule, Rfl: 1    insulin (LANTUS SOLOSTAR U-100 INSULIN) glargine 100 units/mL SubQ pen, Inject 25 Units into the skin every evening., Disp: 6 mL, Rfl: 1    pen needle, diabetic (BD KEVIN 2ND GEN PEN NEEDLE) 32 gauge x 5/32" Ndle, To be used with insulin injections, Disp: 50 each, Rfl: 1    Review of Systems   Constitutional:  Negative for activity change, appetite change, fatigue and fever.   HENT:  Negative for congestion, dental problem, nosebleeds, postnasal drip, rhinorrhea, sinus pain, sore throat and tinnitus.    Eyes:  Negative for pain, discharge, itching and visual disturbance.   Respiratory:  Negative for cough, chest tightness, shortness of breath and wheezing.    Cardiovascular:  Negative for chest pain.   Gastrointestinal:  Negative for abdominal pain, blood in stool, constipation, diarrhea, nausea and vomiting.   Endocrine: Negative for cold intolerance, heat intolerance, polydipsia, polyphagia and polyuria.   Genitourinary:  Negative for difficulty urinating, flank pain, genital sores, hematuria and urgency.   Musculoskeletal:  Negative for arthralgias and " "myalgias.   Skin:  Positive for wound. Negative for rash.   Allergic/Immunologic: Negative for environmental allergies and food allergies.   Neurological:  Positive for numbness. Negative for dizziness, light-headedness and headaches.   Hematological:  Negative for adenopathy. Does not bruise/bleed easily.   Psychiatric/Behavioral:  Negative for behavioral problems, confusion, decreased concentration, sleep disturbance and suicidal ideas. The patient is not nervous/anxious and is not hyperactive.         Objective:      Vitals:    09/27/22 1028   BP: 138/82   Pulse: (!) 111   Resp: 20   Temp: 97.8 °F (36.6 °C)   TempSrc: Oral   SpO2: 98%   Weight: 48.4 kg (106 lb 11.2 oz)   Height: 5' 7" (1.702 m)     Physical Exam  Vitals reviewed.   Constitutional:       General: He is not in acute distress.     Appearance: Normal appearance. He is normal weight. He is not ill-appearing, toxic-appearing or diaphoretic.   HENT:      Head: Normocephalic and atraumatic.      Right Ear: Tympanic membrane and external ear normal. There is no impacted cerumen.      Left Ear: Tympanic membrane and external ear normal. There is no impacted cerumen.      Nose: Nose normal. No congestion or rhinorrhea.      Mouth/Throat:      Mouth: Mucous membranes are moist.      Pharynx: Oropharynx is clear. No oropharyngeal exudate or posterior oropharyngeal erythema.   Eyes:      General: No scleral icterus.        Right eye: No discharge.         Left eye: No discharge.      Extraocular Movements: Extraocular movements intact.      Conjunctiva/sclera: Conjunctivae normal.      Pupils: Pupils are equal, round, and reactive to light.   Cardiovascular:      Rate and Rhythm: Normal rate and regular rhythm.      Pulses: Normal pulses.      Heart sounds: Normal heart sounds. No murmur heard.    No friction rub. No gallop.   Pulmonary:      Effort: Pulmonary effort is normal. No respiratory distress.      Breath sounds: Normal breath sounds. No wheezing, " "rhonchi or rales.   Chest:      Chest wall: No tenderness.   Abdominal:      General: Abdomen is flat. Bowel sounds are normal.      Palpations: Abdomen is soft. There is no mass.      Tenderness: There is no abdominal tenderness. There is no guarding or rebound.   Musculoskeletal:         General: No swelling or signs of injury. Normal range of motion.      Cervical back: Normal range of motion and neck supple. No rigidity or tenderness.      Right lower leg: No edema.      Left lower leg: No edema.   Skin:     General: Skin is warm and dry.      Capillary Refill: Capillary refill takes less than 2 seconds.      Coloration: Skin is not pale.      Findings: Wound (see picture below.) present. No bruising or erythema.          Neurological:      General: No focal deficit present.      Mental Status: He is alert and oriented to person, place, and time. Mental status is at baseline.      Motor: No weakness.      Coordination: Coordination normal.      Gait: Gait normal.   Psychiatric:         Mood and Affect: Mood normal.         Behavior: Behavior normal.         Thought Content: Thought content normal.         Judgment: Judgment normal.           Assessment:       1. Visit for wound check    2. Diabetic peripheral neuropathy    3. Uncontrolled type 2 diabetes mellitus with hyperglycemia           Plan:       Visit for wound check  -Continue wound care as prescribed. Follow up with PCP in coming weeks for further recommendations.     Diabetic peripheral neuropathy  -     gabapentin (NEURONTIN) 300 MG capsule; Take 1 capsule (300 mg total) by mouth 2 (two) times daily.  Dispense: 60 capsule; Refill: 1    Uncontrolled type 2 diabetes mellitus with hyperglycemia  -     pen needle, diabetic (BD KEVIN 2ND GEN PEN NEEDLE) 32 gauge x 5/32" Ndle; To be used with insulin injections  Dispense: 50 each; Refill: 1  -     insulin (LANTUS SOLOSTAR U-100 INSULIN) glargine 100 units/mL SubQ pen; Inject 25 Units into the skin every " evening.  Dispense: 6 mL; Refill: 1      Follow up in about 2 weeks (around 10/11/2022), or if symptoms worsen or fail to improve.        9/30/2022 Aletha Henson, NP

## 2022-11-03 ENCOUNTER — OFFICE VISIT (OUTPATIENT)
Dept: FAMILY MEDICINE | Facility: CLINIC | Age: 47
End: 2022-11-03
Payer: MEDICAID

## 2022-11-03 VITALS
RESPIRATION RATE: 18 BRPM | SYSTOLIC BLOOD PRESSURE: 136 MMHG | TEMPERATURE: 98 F | BODY MASS INDEX: 17.42 KG/M2 | OXYGEN SATURATION: 97 % | HEIGHT: 67 IN | DIASTOLIC BLOOD PRESSURE: 88 MMHG | HEART RATE: 126 BPM | WEIGHT: 111 LBS

## 2022-11-03 DIAGNOSIS — L02.212 CUTANEOUS ABSCESS OF BACK EXCLUDING BUTTOCKS: Primary | ICD-10-CM

## 2022-11-03 DIAGNOSIS — E11.65 UNCONTROLLED TYPE 2 DIABETES MELLITUS WITH HYPERGLYCEMIA: ICD-10-CM

## 2022-11-03 DIAGNOSIS — E11.42 DIABETIC PERIPHERAL NEUROPATHY: ICD-10-CM

## 2022-11-03 DIAGNOSIS — S21.209A OPEN WOUND OF MIDLINE OF BACK: ICD-10-CM

## 2022-11-03 PROCEDURE — 3046F PR MOST RECENT HEMOGLOBIN A1C LEVEL > 9.0%: ICD-10-PCS | Mod: CPTII,,, | Performed by: NURSE PRACTITIONER

## 2022-11-03 PROCEDURE — 3079F DIAST BP 80-89 MM HG: CPT | Mod: CPTII,,, | Performed by: NURSE PRACTITIONER

## 2022-11-03 PROCEDURE — 1159F MED LIST DOCD IN RCRD: CPT | Mod: CPTII,,, | Performed by: NURSE PRACTITIONER

## 2022-11-03 PROCEDURE — 1159F PR MEDICATION LIST DOCUMENTED IN MEDICAL RECORD: ICD-10-PCS | Mod: CPTII,,, | Performed by: NURSE PRACTITIONER

## 2022-11-03 PROCEDURE — 3075F SYST BP GE 130 - 139MM HG: CPT | Mod: CPTII,,, | Performed by: NURSE PRACTITIONER

## 2022-11-03 PROCEDURE — 1160F RVW MEDS BY RX/DR IN RCRD: CPT | Mod: CPTII,,, | Performed by: NURSE PRACTITIONER

## 2022-11-03 PROCEDURE — 99214 OFFICE O/P EST MOD 30 MIN: CPT | Mod: S$PBB,,, | Performed by: NURSE PRACTITIONER

## 2022-11-03 PROCEDURE — 3008F PR BODY MASS INDEX (BMI) DOCUMENTED: ICD-10-PCS | Mod: CPTII,,, | Performed by: NURSE PRACTITIONER

## 2022-11-03 PROCEDURE — 3079F PR MOST RECENT DIASTOLIC BLOOD PRESSURE 80-89 MM HG: ICD-10-PCS | Mod: CPTII,,, | Performed by: NURSE PRACTITIONER

## 2022-11-03 PROCEDURE — 3046F HEMOGLOBIN A1C LEVEL >9.0%: CPT | Mod: CPTII,,, | Performed by: NURSE PRACTITIONER

## 2022-11-03 PROCEDURE — 1160F PR REVIEW ALL MEDS BY PRESCRIBER/CLIN PHARMACIST DOCUMENTED: ICD-10-PCS | Mod: CPTII,,, | Performed by: NURSE PRACTITIONER

## 2022-11-03 PROCEDURE — 99214 PR OFFICE/OUTPT VISIT, EST, LEVL IV, 30-39 MIN: ICD-10-PCS | Mod: S$PBB,,, | Performed by: NURSE PRACTITIONER

## 2022-11-03 PROCEDURE — 3075F PR MOST RECENT SYSTOLIC BLOOD PRESS GE 130-139MM HG: ICD-10-PCS | Mod: CPTII,,, | Performed by: NURSE PRACTITIONER

## 2022-11-03 PROCEDURE — 99215 OFFICE O/P EST HI 40 MIN: CPT | Performed by: NURSE PRACTITIONER

## 2022-11-03 PROCEDURE — 3008F BODY MASS INDEX DOCD: CPT | Mod: CPTII,,, | Performed by: NURSE PRACTITIONER

## 2022-11-03 RX ORDER — ATORVASTATIN CALCIUM 10 MG/1
10 TABLET, FILM COATED ORAL NIGHTLY
Qty: 30 TABLET | Refills: 3 | Status: SHIPPED | OUTPATIENT
Start: 2022-11-03 | End: 2023-02-06 | Stop reason: SDUPTHER

## 2022-11-03 RX ORDER — GABAPENTIN 300 MG/1
300 CAPSULE ORAL 2 TIMES DAILY
Qty: 60 CAPSULE | Refills: 3 | Status: SHIPPED | OUTPATIENT
Start: 2022-11-03 | End: 2023-02-06 | Stop reason: SDUPTHER

## 2022-11-03 RX ORDER — METFORMIN HYDROCHLORIDE 500 MG/1
1000 TABLET ORAL 2 TIMES DAILY WITH MEALS
Qty: 120 TABLET | Refills: 3 | Status: SHIPPED | OUTPATIENT
Start: 2022-11-03 | End: 2023-02-06 | Stop reason: SDUPTHER

## 2022-11-03 RX ORDER — BLOOD SUGAR DIAGNOSTIC
1 STRIP MISCELLANEOUS NIGHTLY
Qty: 90 EACH | Refills: 2 | Status: SHIPPED | OUTPATIENT
Start: 2022-11-03 | End: 2023-02-06

## 2022-11-03 RX ORDER — SULFAMETHOXAZOLE AND TRIMETHOPRIM 800; 160 MG/1; MG/1
1 TABLET ORAL 2 TIMES DAILY
Qty: 14 TABLET | Refills: 0 | Status: SHIPPED | OUTPATIENT
Start: 2022-11-03 | End: 2022-11-10

## 2022-11-03 RX ORDER — INSULIN GLARGINE 100 [IU]/ML
23 INJECTION, SOLUTION SUBCUTANEOUS NIGHTLY
Qty: 6 ML | Refills: 1 | Status: SHIPPED | OUTPATIENT
Start: 2022-11-03 | End: 2023-02-06 | Stop reason: SDUPTHER

## 2022-11-03 NOTE — PROGRESS NOTES
SUBJECTIVE:      Patient ID: Sergio Porter is a 47 y.o. male.    Chief Complaint: Wound Check    Pt presents for F/U DMII and wound check. He has been very noncompliant up to this point, presenting to clinic sporadically usually with complications from his uncontrolled DMII. His last visit with me specifically was July 2021. He presented here to see another provider for a wound check in mid September and was told to follow up in 2 weeks and he is just presenting today. He was initially admitted to the hospital on 9/15/2022 with blood glucose >900 and an abscess to his low back. I & D was completed and his glucose was brought under control during admission. He was discharged with refills with increases on his diabetic regimen and wound care referral. He saw them once and hasn't returned to see them. He reports it is difficult to get there since he was referred to [facility] wound care in [place]. He has been doing wound care to the best of his ability at home. He states he wasn't told to clean the wound with anything, just change the dressing so he has only been wiping the wound with dry gauze. Upon review of records, it appears he was given wound care instructions including cleansing instructions through wound care. He reports upon changing the dressing today, there was a bad smell to the area but he feels it is improving otherwise. Last visit, he also had a complaint of pins and needle feeling to bilateral lower extremities which was worsening and affecting the way he walked. He reports the pain has improved with better glucose control and the gabapentin he was prescribed at his last visit. He continues on Lantus 25 units but reports he has been having morning glucose of 60s and feeling weak. He reports better compliance since this hospitalization with his medications and diabetic diet. Denies side effects from his medications currently. He has been taking 1000mg Metformin twice daily and tolerating well. He is  overdue for his eye exam and reports he is having trouble seeing. He is also overdue for his foot exam and due to his noncompliance will send a podiatry referral for full evaluation. Denies CP, SOB, wheezing, fevers, nausea, vomiting, diarrhea, constipation, numbness, weakness, dizziness, palpitations, or any other concerns at this time.    Wound Check  He was originally treated more than 14 days ago. Previous treatment included wound cleansing or irrigation, I&D of abscess and oral antibiotics. The maximum temperature noted was less than 100.4 F. There has been colored discharge from the wound. The redness has worsened. The swelling has worsened. The pain has improved. He has no difficulty moving the affected extremity or digit.   Diabetes  He presents for his follow-up diabetic visit. He has type 2 diabetes mellitus. His disease course has been improving. There are no hypoglycemic associated symptoms. Pertinent negatives for hypoglycemia include no confusion, dizziness, headaches, hunger, mood changes, nervousness/anxiousness, pallor, seizures, sleepiness, speech difficulty, sweats or tremors. Associated symptoms include blurred vision, foot paresthesias, visual change, weakness and weight loss. Pertinent negatives for diabetes include no chest pain, no fatigue, no foot ulcerations, no polydipsia, no polyphagia and no polyuria. There are no hypoglycemic complications. Pertinent negatives for hypoglycemia complications include no blackouts, no hospitalization, no nocturnal hypoglycemia, no required assistance and no required glucagon injection. Symptoms are improving. Diabetic complications include peripheral neuropathy. Pertinent negatives for diabetic complications include no autonomic neuropathy, CVA, heart disease, impotence, nephropathy, PVD or retinopathy. Risk factors for coronary artery disease include tobacco exposure, male sex and diabetes mellitus. Current diabetic treatment includes insulin injections,  diet and oral agent (monotherapy). His weight is increasing steadily. He is following a diabetic and generally healthy diet. Meal planning includes avoidance of concentrated sweets. He rarely participates in exercise. His home blood glucose trend is decreasing steadily. His breakfast blood glucose range is generally <70 mg/dl. An ACE inhibitor/angiotensin II receptor blocker is not being taken. He does not see a podiatrist.Eye exam is not current.     Past Surgical History:   Procedure Laterality Date    INCISION AND DRAINAGE OF ABSCESS N/A 9/16/2022    Procedure: INCISION AND DRAINAGE, ABSCESS- BACK;  Surgeon: Glenn Fraser MD;  Location: [facility] OR;  Service: General;  Laterality: N/A;     Family History   Problem Relation Age of Onset    Diabetes Mother     Hypertension Father     Heart disease Father     Diabetes Father       Social History     Socioeconomic History    Marital status:     Number of children: 2   Occupational History    Occupation:    Tobacco Use    Smoking status: Every Day     Packs/day: 1.00     Years: 28.00     Pack years: 28.00     Types: Cigarettes    Smokeless tobacco: Never   Substance and Sexual Activity    Alcohol use: Not Currently    Drug use: Not Currently     Types: Marijuana, Other-see comments     Comment: pain medications/opioids    Sexual activity: Not Currently     Current Outpatient Medications   Medication Sig Dispense Refill    blood sugar diagnostic Strp To check BG 3 times daily, to use with insurance preferred meter  Dx E 11.65 100 each 1    blood-glucose meter kit To check BG 3 times daily, to use with insurance preferred meter DX E 11.65 1 each 1    buprenorphine-naloxone 8-2 mg (SUBOXONE) 8-2 mg Place under the tongue once daily. Per patient      lancets Misc To check BG 3 times daily, to use with insurance preferred meter Dx E 11.65 100 each 1    sodium hypochlorite 0.125% (DAKIN'S SOLUTION) external solution Apply topically 2 (two) times  "daily. 473 mL 1    atorvastatin (LIPITOR) 10 MG tablet Take 1 tablet (10 mg total) by mouth every evening. 30 tablet 3    gabapentin (NEURONTIN) 300 MG capsule Take 1 capsule (300 mg total) by mouth 2 (two) times daily. 60 capsule 3    insulin (LANTUS SOLOSTAR U-100 INSULIN) glargine 100 units/mL SubQ pen Inject 23 Units into the skin every evening. 6 mL 1    metFORMIN (GLUCOPHAGE) 500 MG tablet Take 2 tablets (1,000 mg total) by mouth 2 (two) times daily with meals. 120 tablet 3    pen needle, diabetic (BD ULTRA-FINE MICRO PEN NEEDLE) 32 gauge x 1/4" Ndle 1 Device by Misc.(Non-Drug; Combo Route) route every evening. 90 each 2    sulfamethoxazole-trimethoprim 800-160mg (BACTRIM DS) 800-160 mg Tab Take 1 tablet by mouth 2 (two) times daily. for 7 days 14 tablet 0     No current facility-administered medications for this visit.     Review of patient's allergies indicates:  No Known Allergies   Past Medical History:   Diagnosis Date    COVID-19     around june 2022, per patient/wife    Diabetes mellitus, type 2     Dyslipidemia     Opioid abuse      Past Surgical History:   Procedure Laterality Date    INCISION AND DRAINAGE OF ABSCESS N/A 9/16/2022    Procedure: INCISION AND DRAINAGE, ABSCESS- BACK;  Surgeon: Glenn Fraser MD;  Location: [facility];  Service: General;  Laterality: N/A;       Review of Systems   Constitutional:  Positive for weight loss. Negative for activity change, appetite change, chills, fatigue, fever and unexpected weight change.   HENT:  Negative for congestion, ear pain, mouth sores, nosebleeds, postnasal drip, rhinorrhea, sinus pressure, sinus pain, sneezing, sore throat and trouble swallowing.    Eyes:  Positive for blurred vision. Negative for pain and visual disturbance.   Respiratory:  Negative for apnea, cough, chest tightness, shortness of breath, wheezing and stridor.    Cardiovascular:  Negative for chest pain, palpitations and leg swelling.   Gastrointestinal:  Negative for " "abdominal pain, blood in stool, constipation, diarrhea, nausea and vomiting.   Endocrine: Negative for polydipsia, polyphagia and polyuria.   Genitourinary:  Negative for dysuria, flank pain, frequency, hematuria and impotence.   Musculoskeletal:  Negative for arthralgias, myalgias and neck stiffness.   Skin:  Positive for wound. Negative for color change, pallor and rash.   Neurological:  Positive for weakness. Negative for dizziness, tremors, seizures, syncope, speech difficulty, light-headedness, numbness and headaches.   Hematological:  Negative for adenopathy.   Psychiatric/Behavioral:  Negative for confusion, dysphoric mood, sleep disturbance and suicidal ideas. The patient is not nervous/anxious.     OBJECTIVE:      Vitals:    11/03/22 1609   BP: 136/88   BP Location: Right arm   Patient Position: Sitting   BP Method: Large (Manual)   Pulse: (!) 126   Resp: 18   Temp: 98.2 °F (36.8 °C)   TempSrc: Oral   SpO2: 97%   Weight: 50.3 kg (111 lb)   Height: 5' 7" (1.702 m)     Physical Exam  Vitals reviewed.   Constitutional:       General: He is not in acute distress.     Appearance: Normal appearance. He is underweight. He is not diaphoretic.   HENT:      Head: Normocephalic and atraumatic.      Right Ear: Hearing and external ear normal.      Left Ear: Hearing and external ear normal.      Nose: Nose normal.      Mouth/Throat:      Lips: Pink.      Mouth: Mucous membranes are moist.      Dentition: Abnormal dentition.   Eyes:      General: Lids are normal. No scleral icterus.        Right eye: No discharge.         Left eye: No discharge.      Extraocular Movements: Extraocular movements intact.      Conjunctiva/sclera: Conjunctivae normal.      Pupils: Pupils are equal, round, and reactive to light.   Neck:      Trachea: Trachea and phonation normal. No tracheal deviation.   Cardiovascular:      Rate and Rhythm: Normal rate and regular rhythm.      Pulses: Normal pulses.      Heart sounds: Normal heart sounds. No " murmur heard.    No friction rub. No gallop.   Pulmonary:      Effort: Pulmonary effort is normal. No respiratory distress.      Breath sounds: Normal breath sounds. No stridor. No decreased breath sounds, wheezing, rhonchi or rales.   Abdominal:      General: Bowel sounds are normal.      Palpations: Abdomen is soft.      Tenderness: There is no abdominal tenderness.   Musculoskeletal:         General: Normal range of motion.      Cervical back: Full passive range of motion without pain and normal range of motion.      Right lower leg: No edema.      Left lower leg: No edema.   Lymphadenopathy:      Cervical: No cervical adenopathy.      Upper Body:      Right upper body: No supraclavicular adenopathy.      Left upper body: No supraclavicular adenopathy.   Skin:     General: Skin is warm and dry.      Capillary Refill: Capillary refill takes less than 2 seconds.      Findings: Wound present. No rash.             Comments: Post I & D wound present to lumbar region; odor and purulent drainage present; erythema and mild induration present  See media and below for picture   Neurological:      General: No focal deficit present.      Mental Status: He is alert and oriented to person, place, and time.      Coordination: Coordination is intact.      Gait: Gait is intact.   Psychiatric:         Attention and Perception: Attention and perception normal.         Mood and Affect: Mood and affect normal.         Speech: Speech normal.         Behavior: Behavior normal. Behavior is cooperative.         Thought Content: Thought content normal. Thought content does not include suicidal plan.         Cognition and Memory: Cognition and memory normal.         Judgment: Judgment normal.            Assessment:       1. Cutaneous abscess of back excluding buttocks    2. Open wound of midline of back    3. Uncontrolled type 2 diabetes mellitus with hyperglycemia    4. Diabetic peripheral neuropathy        Plan:       Cutaneous abscess  "of back excluding buttocks  Appointment at [facility] made for patient on Monday 11/7/2022 at 8am. Advised to keep appointment. Treatment with Bactrim as below. Advised to get plenty of fluids.  -     Ambulatory referral/consult to Wound Clinic; Future; Expected date: 11/10/2022  -     sulfamethoxazole-trimethoprim 800-160mg (BACTRIM DS) 800-160 mg Tab; Take 1 tablet by mouth 2 (two) times daily. for 7 days  Dispense: 14 tablet; Refill: 0    Open wound of midline of back  -     Ambulatory referral/consult to Wound Clinic; Future; Expected date: 11/10/2022  -     sulfamethoxazole-trimethoprim 800-160mg (BACTRIM DS) 800-160 mg Tab; Take 1 tablet by mouth 2 (two) times daily. for 7 days  Dispense: 14 tablet; Refill: 0    Uncontrolled type 2 diabetes mellitus with hyperglycemia  Decreasing Lantus to 23 units due to lower blood glucose in the mornings. Continue Metformin as prescribed. Eye and foot doctor referrals placed. Labs before next visit. Diabetic education and importance of compliance was reinforced during this visit.  -     insulin (LANTUS SOLOSTAR U-100 INSULIN) glargine 100 units/mL SubQ pen; Inject 23 Units into the skin every evening.  Dispense: 6 mL; Refill: 1  -     atorvastatin (LIPITOR) 10 MG tablet; Take 1 tablet (10 mg total) by mouth every evening.  Dispense: 30 tablet; Refill: 3  -     metFORMIN (GLUCOPHAGE) 500 MG tablet; Take 2 tablets (1,000 mg total) by mouth 2 (two) times daily with meals.  Dispense: 120 tablet; Refill: 3  -     pen needle, diabetic (BD ULTRA-FINE MICRO PEN NEEDLE) 32 gauge x 1/4" Ndle; 1 Device by Misc.(Non-Drug; Combo Route) route every evening.  Dispense: 90 each; Refill: 2  -     Ambulatory referral/consult to Ophthalmology; Future; Expected date: 11/10/2022  -     Ambulatory referral/consult to Podiatry; Future; Expected date: 11/10/2022  -     CBC Auto Differential; Future; Expected date: 11/03/2022  -     Comprehensive Metabolic Panel; Future; Expected date: " 11/03/2022  -     Urinalysis; Future; Expected date: 11/03/2022  -     Microalbumin/Creatinine Ratio, Urine; Future; Expected date: 11/03/2022  -     Hemoglobin A1C; Future; Expected date: 11/03/2022    Diabetic peripheral neuropathy  -     gabapentin (NEURONTIN) 300 MG capsule; Take 1 capsule (300 mg total) by mouth 2 (two) times daily.  Dispense: 60 capsule; Refill: 3      Follow up in about 3 months (around 2/3/2023) for F/U DM.      11/4/2022 JOSE RAUL Burleson, FNP

## 2023-02-02 ENCOUNTER — LAB VISIT (OUTPATIENT)
Dept: LAB | Facility: HOSPITAL | Age: 48
End: 2023-02-02
Attending: NURSE PRACTITIONER
Payer: MEDICAID

## 2023-02-02 DIAGNOSIS — E11.65 UNCONTROLLED TYPE 2 DIABETES MELLITUS WITH HYPERGLYCEMIA: ICD-10-CM

## 2023-02-02 LAB
ALBUMIN SERPL BCP-MCNC: 3.5 G/DL (ref 3.5–5.2)
ALBUMIN/CREAT UR: 12.6 UG/MG (ref 0–30)
ALP SERPL-CCNC: 137 U/L (ref 55–135)
ALT SERPL W/O P-5'-P-CCNC: 18 U/L (ref 10–44)
ANION GAP SERPL CALC-SCNC: 9 MMOL/L (ref 8–16)
AST SERPL-CCNC: 24 U/L (ref 10–40)
BACTERIA #/AREA URNS HPF: NEGATIVE /HPF
BASOPHILS # BLD AUTO: 0.04 K/UL (ref 0–0.2)
BASOPHILS NFR BLD: 0.5 % (ref 0–1.9)
BILIRUB SERPL-MCNC: 0.4 MG/DL (ref 0.1–1)
BILIRUB UR QL STRIP: NEGATIVE
BUN SERPL-MCNC: 22 MG/DL (ref 6–20)
CALCIUM SERPL-MCNC: 8.8 MG/DL (ref 8.7–10.5)
CHLORIDE SERPL-SCNC: 99 MMOL/L (ref 95–110)
CLARITY UR: CLEAR
CO2 SERPL-SCNC: 25 MMOL/L (ref 23–29)
COLOR UR: YELLOW
CREAT SERPL-MCNC: 0.7 MG/DL (ref 0.5–1.4)
CREAT UR-MCNC: 108 MG/DL (ref 23–375)
DIFFERENTIAL METHOD: ABNORMAL
EOSINOPHIL # BLD AUTO: 0.3 K/UL (ref 0–0.5)
EOSINOPHIL NFR BLD: 3.9 % (ref 0–8)
ERYTHROCYTE [DISTWIDTH] IN BLOOD BY AUTOMATED COUNT: 13.2 % (ref 11.5–14.5)
EST. GFR  (NO RACE VARIABLE): >60 ML/MIN/1.73 M^2
GLUCOSE SERPL-MCNC: 322 MG/DL (ref 70–110)
GLUCOSE UR QL STRIP: ABNORMAL
HCT VFR BLD AUTO: 36.1 % (ref 40–54)
HGB BLD-MCNC: 11.7 G/DL (ref 14–18)
HGB UR QL STRIP: NEGATIVE
HYALINE CASTS #/AREA URNS LPF: 0 /LPF
IMM GRANULOCYTES # BLD AUTO: 0.02 K/UL (ref 0–0.04)
IMM GRANULOCYTES NFR BLD AUTO: 0.3 % (ref 0–0.5)
KETONES UR QL STRIP: NEGATIVE
LEUKOCYTE ESTERASE UR QL STRIP: NEGATIVE
LYMPHOCYTES # BLD AUTO: 2.5 K/UL (ref 1–4.8)
LYMPHOCYTES NFR BLD: 32 % (ref 18–48)
MCH RBC QN AUTO: 27.9 PG (ref 27–31)
MCHC RBC AUTO-ENTMCNC: 32.4 G/DL (ref 32–36)
MCV RBC AUTO: 86 FL (ref 82–98)
MICROALBUMIN UR DL<=1MG/L-MCNC: 13.6 UG/ML
MICROSCOPIC COMMENT: ABNORMAL
MONOCYTES # BLD AUTO: 0.6 K/UL (ref 0.3–1)
MONOCYTES NFR BLD: 7.6 % (ref 4–15)
NEUTROPHILS # BLD AUTO: 4.4 K/UL (ref 1.8–7.7)
NEUTROPHILS NFR BLD: 55.7 % (ref 38–73)
NITRITE UR QL STRIP: NEGATIVE
NRBC BLD-RTO: 0 /100 WBC
PH UR STRIP: 6 [PH] (ref 5–8)
PLATELET # BLD AUTO: 293 K/UL (ref 150–450)
PMV BLD AUTO: 8 FL (ref 9.2–12.9)
POTASSIUM SERPL-SCNC: 3.9 MMOL/L (ref 3.5–5.1)
PROT SERPL-MCNC: 7.4 G/DL (ref 6–8.4)
PROT UR QL STRIP: ABNORMAL
RBC # BLD AUTO: 4.19 M/UL (ref 4.6–6.2)
RBC #/AREA URNS HPF: 0 /HPF (ref 0–4)
SODIUM SERPL-SCNC: 133 MMOL/L (ref 136–145)
SP GR UR STRIP: >1.03 (ref 1–1.03)
SQUAMOUS #/AREA URNS HPF: 0 /HPF
URN SPEC COLLECT METH UR: ABNORMAL
UROBILINOGEN UR STRIP-ACNC: ABNORMAL EU/DL
WBC # BLD AUTO: 7.79 K/UL (ref 3.9–12.7)
WBC #/AREA URNS HPF: 0 /HPF (ref 0–5)
YEAST URNS QL MICRO: ABNORMAL

## 2023-02-02 PROCEDURE — 36415 COLL VENOUS BLD VENIPUNCTURE: CPT | Performed by: NURSE PRACTITIONER

## 2023-02-02 PROCEDURE — 82570 ASSAY OF URINE CREATININE: CPT | Performed by: NURSE PRACTITIONER

## 2023-02-02 PROCEDURE — 83036 HEMOGLOBIN GLYCOSYLATED A1C: CPT | Performed by: NURSE PRACTITIONER

## 2023-02-02 PROCEDURE — 81001 URINALYSIS AUTO W/SCOPE: CPT | Performed by: NURSE PRACTITIONER

## 2023-02-02 PROCEDURE — 80053 COMPREHEN METABOLIC PANEL: CPT | Performed by: NURSE PRACTITIONER

## 2023-02-02 PROCEDURE — 85025 COMPLETE CBC W/AUTO DIFF WBC: CPT | Performed by: NURSE PRACTITIONER

## 2023-02-03 LAB
ESTIMATED AVG GLUCOSE: 326 MG/DL (ref 68–131)
HBA1C MFR BLD: 13 % (ref 4.5–6.2)

## 2023-02-06 ENCOUNTER — OFFICE VISIT (OUTPATIENT)
Dept: FAMILY MEDICINE | Facility: CLINIC | Age: 48
End: 2023-02-06
Payer: MEDICAID

## 2023-02-06 VITALS
DIASTOLIC BLOOD PRESSURE: 82 MMHG | HEART RATE: 106 BPM | HEIGHT: 67 IN | BODY MASS INDEX: 18.03 KG/M2 | WEIGHT: 114.88 LBS | SYSTOLIC BLOOD PRESSURE: 130 MMHG | OXYGEN SATURATION: 99 %

## 2023-02-06 DIAGNOSIS — E11.42 DIABETIC PERIPHERAL NEUROPATHY: ICD-10-CM

## 2023-02-06 DIAGNOSIS — E11.65 UNCONTROLLED TYPE 2 DIABETES MELLITUS WITH HYPERGLYCEMIA: Primary | ICD-10-CM

## 2023-02-06 DIAGNOSIS — Z12.11 COLON CANCER SCREENING: ICD-10-CM

## 2023-02-06 PROCEDURE — 99215 PR OFFICE/OUTPT VISIT, EST, LEVL V, 40-54 MIN: ICD-10-PCS | Mod: S$PBB,,, | Performed by: NURSE PRACTITIONER

## 2023-02-06 PROCEDURE — 3061F NEG MICROALBUMINURIA REV: CPT | Mod: CPTII,,, | Performed by: NURSE PRACTITIONER

## 2023-02-06 PROCEDURE — 3046F PR MOST RECENT HEMOGLOBIN A1C LEVEL > 9.0%: ICD-10-PCS | Mod: CPTII,,, | Performed by: NURSE PRACTITIONER

## 2023-02-06 PROCEDURE — 1159F PR MEDICATION LIST DOCUMENTED IN MEDICAL RECORD: ICD-10-PCS | Mod: CPTII,,, | Performed by: NURSE PRACTITIONER

## 2023-02-06 PROCEDURE — 3061F PR NEG MICROALBUMINURIA RESULT DOCUMENTED/REVIEW: ICD-10-PCS | Mod: CPTII,,, | Performed by: NURSE PRACTITIONER

## 2023-02-06 PROCEDURE — 3075F SYST BP GE 130 - 139MM HG: CPT | Mod: CPTII,,, | Performed by: NURSE PRACTITIONER

## 2023-02-06 PROCEDURE — 3066F NEPHROPATHY DOC TX: CPT | Mod: CPTII,,, | Performed by: NURSE PRACTITIONER

## 2023-02-06 PROCEDURE — 99214 OFFICE O/P EST MOD 30 MIN: CPT | Performed by: NURSE PRACTITIONER

## 2023-02-06 PROCEDURE — 3079F PR MOST RECENT DIASTOLIC BLOOD PRESSURE 80-89 MM HG: ICD-10-PCS | Mod: CPTII,,, | Performed by: NURSE PRACTITIONER

## 2023-02-06 PROCEDURE — 3008F PR BODY MASS INDEX (BMI) DOCUMENTED: ICD-10-PCS | Mod: CPTII,,, | Performed by: NURSE PRACTITIONER

## 2023-02-06 PROCEDURE — 3046F HEMOGLOBIN A1C LEVEL >9.0%: CPT | Mod: CPTII,,, | Performed by: NURSE PRACTITIONER

## 2023-02-06 PROCEDURE — 3008F BODY MASS INDEX DOCD: CPT | Mod: CPTII,,, | Performed by: NURSE PRACTITIONER

## 2023-02-06 PROCEDURE — 3079F DIAST BP 80-89 MM HG: CPT | Mod: CPTII,,, | Performed by: NURSE PRACTITIONER

## 2023-02-06 PROCEDURE — 1159F MED LIST DOCD IN RCRD: CPT | Mod: CPTII,,, | Performed by: NURSE PRACTITIONER

## 2023-02-06 PROCEDURE — 3066F PR DOCUMENTATION OF TREATMENT FOR NEPHROPATHY: ICD-10-PCS | Mod: CPTII,,, | Performed by: NURSE PRACTITIONER

## 2023-02-06 PROCEDURE — 1160F RVW MEDS BY RX/DR IN RCRD: CPT | Mod: CPTII,,, | Performed by: NURSE PRACTITIONER

## 2023-02-06 PROCEDURE — 99215 OFFICE O/P EST HI 40 MIN: CPT | Mod: S$PBB,,, | Performed by: NURSE PRACTITIONER

## 2023-02-06 PROCEDURE — 3075F PR MOST RECENT SYSTOLIC BLOOD PRESS GE 130-139MM HG: ICD-10-PCS | Mod: CPTII,,, | Performed by: NURSE PRACTITIONER

## 2023-02-06 PROCEDURE — 1160F PR REVIEW ALL MEDS BY PRESCRIBER/CLIN PHARMACIST DOCUMENTED: ICD-10-PCS | Mod: CPTII,,, | Performed by: NURSE PRACTITIONER

## 2023-02-06 RX ORDER — PEN NEEDLE, DIABETIC 32GX 5/32"
NEEDLE, DISPOSABLE MISCELLANEOUS
COMMUNITY
Start: 2022-11-03

## 2023-02-06 RX ORDER — INSULIN GLARGINE 100 [IU]/ML
28 INJECTION, SOLUTION SUBCUTANEOUS NIGHTLY
Qty: 6 ML | Refills: 1 | Status: SHIPPED | OUTPATIENT
Start: 2023-02-06 | End: 2023-05-15 | Stop reason: SDUPTHER

## 2023-02-06 RX ORDER — METFORMIN HYDROCHLORIDE 500 MG/1
1000 TABLET ORAL 2 TIMES DAILY WITH MEALS
Qty: 120 TABLET | Refills: 3 | Status: SHIPPED | OUTPATIENT
Start: 2023-02-06 | End: 2023-05-15 | Stop reason: SDUPTHER

## 2023-02-06 RX ORDER — GABAPENTIN 300 MG/1
300 CAPSULE ORAL 2 TIMES DAILY
Qty: 60 CAPSULE | Refills: 3 | Status: SHIPPED | OUTPATIENT
Start: 2023-02-06 | End: 2023-05-15 | Stop reason: SDUPTHER

## 2023-02-06 RX ORDER — ATORVASTATIN CALCIUM 10 MG/1
10 TABLET, FILM COATED ORAL NIGHTLY
Qty: 30 TABLET | Refills: 3 | Status: SHIPPED | OUTPATIENT
Start: 2023-02-06 | End: 2023-05-15 | Stop reason: SDUPTHER

## 2023-02-06 NOTE — PROGRESS NOTES
SUBJECTIVE:      Patient ID: Sergio Porter is a 47 y.o. male.    Chief Complaint: Diabetes    Pt presents for F/U DMII. He has been very noncompliant up to this point, presenting to clinic sporadically usually with complications from his uncontrolled DMII. His last visit with me was in November 2022 and previous to that July 2021. Today, he continues to make strides to being more compliant reporting he continues on Metformin 1000mg BID and Lantus 23 units. The Lantus was decreased after reporting he was having morning weakness last visit. We reviewed his labs together which continue to show largely uncontrolled DMII and that morning lows were not quite probable and he needed to be checking his blood glucose when feeling this way and eating more regularly throughout the day. He reports uncontrolled diet during Thanksgiving, Christmas, and New Years. We discussed his diet on a regular day and this was his dietary recall:  Breakfast: [employer] callejas egg and cheese biscuit with has browns and a regular Coke  Lunch: doesn't eat  Dinner: sometimes pork and beans, sometimes pasta, or hamburgers and potatoes   Snacks during the day: chips  We discussed where his options went wrong and he discussed with me the difficulty he has with options due to his lack of teeth to tear meat. He has dentures but they don't fit appropriately. His open wound from last visit is completely healed after care set up with [facility]. He continues to have improvement of his neuropathy with the gabapentin he was prescribed. He reports some impotence and is requesting medication for such but he was educated this may be due to hyperglycemia at this point and it was recommended we get his blood sugar under control prior to a prescription being given. He remains overdue for his eye exam and was referred last visit. He continues to be overdue for his foot exam and was referred last visit. Denies CP, SOB, wheezing, fevers, nausea, vomiting,  diarrhea, constipation, numbness, weakness, dizziness, palpitations, or any other concerns at this time.    Diabetes  He presents for his follow-up diabetic visit. He has type 2 diabetes mellitus. His disease course has been worsening. There are no hypoglycemic associated symptoms. Pertinent negatives for hypoglycemia include no confusion, dizziness, headaches, nervousness/anxiousness, seizures or tremors. Associated symptoms include blurred vision, foot paresthesias, weakness and weight loss. Pertinent negatives for diabetes include no chest pain, no fatigue, no foot ulcerations, no polydipsia, no polyphagia, no polyuria and no visual change. There are no hypoglycemic complications. Symptoms are worsening. Diabetic complications include impotence and peripheral neuropathy. Pertinent negatives for diabetic complications include no autonomic neuropathy, CVA, heart disease, nephropathy, PVD or retinopathy. Risk factors for coronary artery disease include tobacco exposure, male sex and diabetes mellitus. Current diabetic treatment includes insulin injections and oral agent (monotherapy). He is compliant with treatment some of the time. His weight is fluctuating minimally. He is following a generally unhealthy diet. When asked about meal planning, he reported none. He never participates in exercise. An ACE inhibitor/angiotensin II receptor blocker is not being taken. He does not see a podiatrist.Eye exam is not current.     Past Surgical History:   Procedure Laterality Date    INCISION AND DRAINAGE OF ABSCESS N/A 9/16/2022    Procedure: INCISION AND DRAINAGE, ABSCESS- BACK;  Surgeon: Glenn Fraser MD;  Location: [facility];  Service: General;  Laterality: N/A;     Family History   Problem Relation Age of Onset    Diabetes Mother     Hypertension Father     Heart disease Father     Diabetes Father       Social History     Socioeconomic History    Marital status:     Number of children: 2   Occupational  "History    Occupation:    Tobacco Use    Smoking status: Every Day     Packs/day: 1.00     Years: 28.00     Pack years: 28.00     Types: Cigarettes    Smokeless tobacco: Never   Substance and Sexual Activity    Alcohol use: Not Currently    Drug use: Not Currently     Types: Marijuana, Other-see comments     Comment: pain medications/opioids    Sexual activity: Not Currently     Current Outpatient Medications   Medication Sig Dispense Refill    blood sugar diagnostic Strp To check BG 3 times daily, to use with insurance preferred meter  Dx E 11.65 100 each 1    blood-glucose meter kit To check BG 3 times daily, to use with insurance preferred meter DX E 11.65 1 each 1    buprenorphine-naloxone 8-2 mg (SUBOXONE) 8-2 mg Place under the tongue once daily. Per patient      lancets Misc To check BG 3 times daily, to use with insurance preferred meter Dx E 11.65 100 each 1    sodium hypochlorite 0.125% (DAKIN'S SOLUTION) external solution Apply topically 2 (two) times daily. 473 mL 1    atorvastatin (LIPITOR) 10 MG tablet Take 1 tablet (10 mg total) by mouth every evening. 30 tablet 3    BD KEVIN 2ND GEN PEN NEEDLE 32 gauge x 5/32" Ndle USE ONCE EVERY EVENING must last 90 days      gabapentin (NEURONTIN) 300 MG capsule Take 1 capsule (300 mg total) by mouth 2 (two) times daily. 60 capsule 3    insulin (LANTUS SOLOSTAR U-100 INSULIN) glargine 100 units/mL SubQ pen Inject 28 Units into the skin every evening. 6 mL 1    metFORMIN (GLUCOPHAGE) 500 MG tablet Take 2 tablets (1,000 mg total) by mouth 2 (two) times daily with meals. 120 tablet 3     No current facility-administered medications for this visit.     Review of patient's allergies indicates:  No Known Allergies   Past Medical History:   Diagnosis Date    COVID-19     around june 2022, per patient/wife    Diabetes mellitus, type 2     Dyslipidemia     Opioid abuse      Past Surgical History:   Procedure Laterality Date    INCISION AND DRAINAGE OF ABSCESS " "N/A 9/16/2022    Procedure: INCISION AND DRAINAGE, ABSCESS- BACK;  Surgeon: Glenn Fraser MD;  Location: [facility];  Service: General;  Laterality: N/A;       Review of Systems   Constitutional:  Positive for weight loss. Negative for activity change, appetite change, chills, fatigue, fever and unexpected weight change.   HENT:  Negative for congestion, ear pain, mouth sores, nosebleeds, postnasal drip, rhinorrhea, sinus pressure, sinus pain, sneezing, sore throat and trouble swallowing.    Eyes:  Positive for blurred vision. Negative for pain and visual disturbance.   Respiratory:  Negative for apnea, cough, chest tightness, shortness of breath, wheezing and stridor.    Cardiovascular:  Negative for chest pain, palpitations and leg swelling.   Gastrointestinal:  Negative for abdominal pain, blood in stool, constipation, diarrhea, nausea and vomiting.   Endocrine: Negative for polydipsia, polyphagia and polyuria.   Genitourinary:  Positive for impotence. Negative for dysuria, flank pain, frequency and hematuria.   Musculoskeletal:  Negative for arthralgias, myalgias and neck stiffness.   Skin:  Negative for color change, rash and wound.   Neurological:  Positive for weakness and numbness. Negative for dizziness, tremors, seizures, syncope, light-headedness and headaches.   Hematological:  Negative for adenopathy.   Psychiatric/Behavioral:  Negative for confusion, dysphoric mood, sleep disturbance and suicidal ideas. The patient is not nervous/anxious.     OBJECTIVE:      Vitals:    02/06/23 1047 02/06/23 1134   BP: (!) 140/85 130/82   BP Location: Right arm    Patient Position: Sitting    BP Method: Medium (Manual)    Pulse: 106    SpO2: 99%    Weight: 52.1 kg (114 lb 14.4 oz)    Height: 5' 7" (1.702 m)      Physical Exam  Vitals reviewed.   Constitutional:       General: He is not in acute distress.     Appearance: Normal appearance. He is well-developed and underweight. He is not diaphoretic.   HENT:      " Head: Normocephalic and atraumatic.      Right Ear: Hearing and external ear normal.      Left Ear: Hearing and external ear normal.      Nose: Nose normal.      Mouth/Throat:      Lips: Pink.      Mouth: Mucous membranes are moist.   Eyes:      General: Lids are normal. No scleral icterus.        Right eye: No discharge.         Left eye: No discharge.      Extraocular Movements: Extraocular movements intact.      Conjunctiva/sclera: Conjunctivae normal.      Pupils: Pupils are equal, round, and reactive to light.   Neck:      Thyroid: No thyroid mass or thyromegaly.      Vascular: No carotid bruit.      Trachea: Trachea and phonation normal. No tracheal deviation.   Cardiovascular:      Rate and Rhythm: Normal rate and regular rhythm.      Pulses: Normal pulses.      Heart sounds: Normal heart sounds. No murmur heard.    No friction rub. No gallop.   Pulmonary:      Effort: Pulmonary effort is normal. No respiratory distress.      Breath sounds: Normal breath sounds. No stridor. No decreased breath sounds, wheezing, rhonchi or rales.   Abdominal:      General: Bowel sounds are normal.      Palpations: Abdomen is soft.      Tenderness: There is no abdominal tenderness.   Musculoskeletal:         General: Normal range of motion.      Cervical back: Full passive range of motion without pain, normal range of motion and neck supple.      Right lower leg: No edema.      Left lower leg: No edema.   Lymphadenopathy:      Cervical: No cervical adenopathy.      Upper Body:      Right upper body: No supraclavicular adenopathy.      Left upper body: No supraclavicular adenopathy.   Skin:     General: Skin is warm and dry.      Capillary Refill: Capillary refill takes less than 2 seconds.      Findings: No rash.   Neurological:      General: No focal deficit present.      Mental Status: He is alert and oriented to person, place, and time.      Coordination: Coordination is intact.      Gait: Gait is intact.   Psychiatric:          Attention and Perception: Attention and perception normal.         Mood and Affect: Mood and affect normal.         Speech: Speech normal.         Behavior: Behavior normal. Behavior is cooperative.         Thought Content: Thought content normal. Thought content does not include suicidal plan.         Cognition and Memory: Cognition and memory normal.         Judgment: Judgment normal.      Assessment:       1. Uncontrolled type 2 diabetes mellitus with hyperglycemia    2. Diabetic peripheral neuropathy    3. Colon cancer screening          Plan:       Uncontrolled type 2 diabetes mellitus with hyperglycemia  At this time short acting insulin would be risky due to pt's habit of not eating regularly and pt's hyperglycemia is too uncontrolled for SGLT2 with risk of diabetic ketoacidosis. Intensive dietary training given with him and handouts given on how to handle low blood sugar appropriately. Would like him to focus on decreasing carbs and regular sodas in the diet to decrease sugar and A1c to a lower level before adding another medication at this time. Continue regimen as below with increased Lantus as below. Discussed checking blood sugar before treating as low because it is possibly a side effect from treatment due to pt's blood sugar being uncontrolled for as long as it has. Lengthy discussion about diet and how to adjust diet for this disease. F/U in 1 month.  -     insulin (LANTUS SOLOSTAR U-100 INSULIN) glargine 100 units/mL SubQ pen; Inject 28 Units into the skin every evening.  Dispense: 6 mL; Refill: 1  -     metFORMIN (GLUCOPHAGE) 500 MG tablet; Take 2 tablets (1,000 mg total) by mouth 2 (two) times daily with meals.  Dispense: 120 tablet; Refill: 3  -     atorvastatin (LIPITOR) 10 MG tablet; Take 1 tablet (10 mg total) by mouth every evening.  Dispense: 30 tablet; Refill: 3    Diabetic peripheral neuropathy  -     gabapentin (NEURONTIN) 300 MG capsule; Take 1 capsule (300 mg total) by mouth 2 (two)  times daily.  Dispense: 60 capsule; Refill: 3    Colon cancer screening  -     Cologuard Screening (Multitarget Stool DNA); Future; Expected date: 02/06/2023        Follow up in about 4 weeks (around 3/6/2023) for F/U DMII.      2/6/2023 JOSE RAUL Burleson, FNP

## 2023-02-06 NOTE — PROGRESS NOTES
Reviewed in person at scheduled appointment on 2/6/2023. Uncontrolled DMII still. Adjusted Lantus. Intensively reviewed dietary restrictions. F/U in 4 weeks.

## 2023-02-07 ENCOUNTER — TELEPHONE (OUTPATIENT)
Dept: FAMILY MEDICINE | Facility: CLINIC | Age: 48
End: 2023-02-07

## 2023-02-07 NOTE — TELEPHONE ENCOUNTER
----- Message from MOISÉS Zuniga sent at 2/6/2023 12:32 PM CST -----  Please call [facility] and see if pt was seen as referred and request notes if he was.

## 2023-02-07 NOTE — TELEPHONE ENCOUNTER
[facility]oke to [facility] and they state they have bee trying to contact pt to get scheduled and he never answers or returns calls.       Attempted to contact pt but wife answered. Wife states she will have pt to contact them to schedule. I gave her their number.

## 2023-03-17 ENCOUNTER — PATIENT MESSAGE (OUTPATIENT)
Dept: RESEARCH | Facility: HOSPITAL | Age: 48
End: 2023-03-17
Payer: MEDICAID

## 2023-05-15 ENCOUNTER — OFFICE VISIT (OUTPATIENT)
Dept: FAMILY MEDICINE | Facility: CLINIC | Age: 48
End: 2023-05-15
Payer: MEDICAID

## 2023-05-15 VITALS
TEMPERATURE: 98 F | DIASTOLIC BLOOD PRESSURE: 80 MMHG | HEART RATE: 78 BPM | BODY MASS INDEX: 17.95 KG/M2 | SYSTOLIC BLOOD PRESSURE: 110 MMHG | RESPIRATION RATE: 20 BRPM | WEIGHT: 114.38 LBS | HEIGHT: 67 IN

## 2023-05-15 DIAGNOSIS — E11.42 DIABETIC PERIPHERAL NEUROPATHY: ICD-10-CM

## 2023-05-15 DIAGNOSIS — Z91.199 MEDICAL NON-COMPLIANCE: ICD-10-CM

## 2023-05-15 DIAGNOSIS — Z11.59 ENCOUNTER FOR HEPATITIS C SCREENING TEST FOR LOW RISK PATIENT: ICD-10-CM

## 2023-05-15 DIAGNOSIS — Z11.4 SCREENING FOR HIV WITHOUT PRESENCE OF RISK FACTORS: ICD-10-CM

## 2023-05-15 DIAGNOSIS — E11.65 UNCONTROLLED TYPE 2 DIABETES MELLITUS WITH HYPERGLYCEMIA: Primary | ICD-10-CM

## 2023-05-15 PROCEDURE — 99214 OFFICE O/P EST MOD 30 MIN: CPT | Mod: S$PBB,,, | Performed by: NURSE PRACTITIONER

## 2023-05-15 PROCEDURE — 3066F PR DOCUMENTATION OF TREATMENT FOR NEPHROPATHY: ICD-10-PCS | Mod: CPTII,,, | Performed by: NURSE PRACTITIONER

## 2023-05-15 PROCEDURE — 1159F MED LIST DOCD IN RCRD: CPT | Mod: CPTII,,, | Performed by: NURSE PRACTITIONER

## 2023-05-15 PROCEDURE — 3079F DIAST BP 80-89 MM HG: CPT | Mod: CPTII,,, | Performed by: NURSE PRACTITIONER

## 2023-05-15 PROCEDURE — 3074F PR MOST RECENT SYSTOLIC BLOOD PRESSURE < 130 MM HG: ICD-10-PCS | Mod: CPTII,,, | Performed by: NURSE PRACTITIONER

## 2023-05-15 PROCEDURE — 1159F PR MEDICATION LIST DOCUMENTED IN MEDICAL RECORD: ICD-10-PCS | Mod: CPTII,,, | Performed by: NURSE PRACTITIONER

## 2023-05-15 PROCEDURE — 99214 PR OFFICE/OUTPT VISIT, EST, LEVL IV, 30-39 MIN: ICD-10-PCS | Mod: S$PBB,,, | Performed by: NURSE PRACTITIONER

## 2023-05-15 PROCEDURE — 3079F PR MOST RECENT DIASTOLIC BLOOD PRESSURE 80-89 MM HG: ICD-10-PCS | Mod: CPTII,,, | Performed by: NURSE PRACTITIONER

## 2023-05-15 PROCEDURE — 3046F PR MOST RECENT HEMOGLOBIN A1C LEVEL > 9.0%: ICD-10-PCS | Mod: CPTII,,, | Performed by: NURSE PRACTITIONER

## 2023-05-15 PROCEDURE — 3061F NEG MICROALBUMINURIA REV: CPT | Mod: CPTII,,, | Performed by: NURSE PRACTITIONER

## 2023-05-15 PROCEDURE — 3074F SYST BP LT 130 MM HG: CPT | Mod: CPTII,,, | Performed by: NURSE PRACTITIONER

## 2023-05-15 PROCEDURE — 3066F NEPHROPATHY DOC TX: CPT | Mod: CPTII,,, | Performed by: NURSE PRACTITIONER

## 2023-05-15 PROCEDURE — 99214 OFFICE O/P EST MOD 30 MIN: CPT | Performed by: NURSE PRACTITIONER

## 2023-05-15 PROCEDURE — 3046F HEMOGLOBIN A1C LEVEL >9.0%: CPT | Mod: CPTII,,, | Performed by: NURSE PRACTITIONER

## 2023-05-15 PROCEDURE — 3008F PR BODY MASS INDEX (BMI) DOCUMENTED: ICD-10-PCS | Mod: CPTII,,, | Performed by: NURSE PRACTITIONER

## 2023-05-15 PROCEDURE — 3061F PR NEG MICROALBUMINURIA RESULT DOCUMENTED/REVIEW: ICD-10-PCS | Mod: CPTII,,, | Performed by: NURSE PRACTITIONER

## 2023-05-15 PROCEDURE — 3008F BODY MASS INDEX DOCD: CPT | Mod: CPTII,,, | Performed by: NURSE PRACTITIONER

## 2023-05-15 RX ORDER — ATORVASTATIN CALCIUM 10 MG/1
10 TABLET, FILM COATED ORAL NIGHTLY
Qty: 30 TABLET | Refills: 3 | Status: SHIPPED | OUTPATIENT
Start: 2023-05-15

## 2023-05-15 RX ORDER — METFORMIN HYDROCHLORIDE 500 MG/1
1000 TABLET ORAL 2 TIMES DAILY WITH MEALS
Qty: 120 TABLET | Refills: 3 | Status: SHIPPED | OUTPATIENT
Start: 2023-05-15 | End: 2024-02-07 | Stop reason: SDUPTHER

## 2023-05-15 RX ORDER — GABAPENTIN 300 MG/1
300 CAPSULE ORAL 2 TIMES DAILY
Qty: 60 CAPSULE | Refills: 1 | Status: SHIPPED | OUTPATIENT
Start: 2023-05-15 | End: 2024-01-26

## 2023-05-15 RX ORDER — INSULIN GLARGINE 100 [IU]/ML
28 INJECTION, SOLUTION SUBCUTANEOUS NIGHTLY
Qty: 6 ML | Refills: 1 | Status: ON HOLD | OUTPATIENT
Start: 2023-05-15 | End: 2024-02-01 | Stop reason: HOSPADM

## 2023-05-15 NOTE — PROGRESS NOTES
" Patient ID: Sergio Porter is a 48 y.o. male.    Chief Complaint: Medication Refill (47 yo male here for med refill. KM)    Mr. Porter presents today for 3 month follow up for his Diabetes. He states he has been taking his medication as prescribed but recently ran out of medication and is requesting refills. His recent A1C was in the range of 13 but he has not had control of it in the past 3 years. He states he does his best with diet but he has not been eating well as of late. He is asking for medication for  ED. This is due to him not having adequate control of his diabetes. I explained to him that we can revisit this soon. He has not had follow up as requested with eye doctor or podiatrist. His wound has healed completely.           Past Medical History:   Diagnosis Date    COVID-19     around june 2022, per patient/wife    Diabetes mellitus, type 2     Dyslipidemia     Opioid abuse      Past Surgical History:   Procedure Laterality Date    INCISION AND DRAINAGE OF ABSCESS N/A 9/16/2022    Procedure: INCISION AND DRAINAGE, ABSCESS- BACK;  Surgeon: Glenn Fraser MD;  Location: [facility];  Service: General;  Laterality: N/A;         Tobacco History:  reports that he has been smoking cigarettes. He has a 28.00 pack-year smoking history. He has never used smokeless tobacco.      Review of patient's allergies indicates:  No Known Allergies    Current Outpatient Medications:     buprenorphine-naloxone 8-2 mg (SUBOXONE) 8-2 mg, Place under the tongue once daily. Per patient, Disp: , Rfl:     sodium hypochlorite 0.125% (DAKIN'S SOLUTION) external solution, Apply topically 2 (two) times daily., Disp: 473 mL, Rfl: 1    atorvastatin (LIPITOR) 10 MG tablet, Take 1 tablet (10 mg total) by mouth every evening., Disp: 30 tablet, Rfl: 3    BD KEVIN 2ND GEN PEN NEEDLE 32 gauge x 5/32" Ndle, USE ONCE EVERY EVENING must last 90 days, Disp: , Rfl:     blood sugar diagnostic Strp, To check BG 3 times daily, to use with " insurance preferred meter  Dx E 11.65, Disp: 100 each, Rfl: 1    blood-glucose meter kit, To check BG 3 times daily, to use with insurance preferred meter DX E 11.65, Disp: 1 each, Rfl: 1    gabapentin (NEURONTIN) 300 MG capsule, Take 1 capsule (300 mg total) by mouth 2 (two) times daily., Disp: 60 capsule, Rfl: 1    insulin (LANTUS SOLOSTAR U-100 INSULIN) glargine 100 units/mL SubQ pen, Inject 28 Units into the skin every evening., Disp: 6 mL, Rfl: 1    lancets Misc, To check BG 3 times daily, to use with insurance preferred meter Dx E 11.65, Disp: 100 each, Rfl: 1    metFORMIN (GLUCOPHAGE) 500 MG tablet, Take 2 tablets (1,000 mg total) by mouth 2 (two) times daily with meals., Disp: 120 tablet, Rfl: 3    Review of Systems   Constitutional:  Negative for activity change, appetite change, chills, fatigue, fever and unexpected weight change.   HENT:  Negative for congestion, ear pain, mouth sores, nosebleeds, postnasal drip, rhinorrhea, sinus pressure, sinus pain, sneezing, sore throat and trouble swallowing.    Eyes:  Negative for pain and visual disturbance.   Respiratory:  Negative for apnea, cough, chest tightness, shortness of breath, wheezing and stridor.    Cardiovascular:  Negative for chest pain, palpitations and leg swelling.   Gastrointestinal:  Negative for abdominal pain, blood in stool, constipation, diarrhea, nausea and vomiting.   Endocrine: Negative for polydipsia, polyphagia and polyuria.   Genitourinary:  Negative for dysuria, flank pain, frequency and hematuria.   Musculoskeletal:  Negative for arthralgias, myalgias and neck stiffness.   Skin:  Negative for color change, rash and wound.   Neurological:  Positive for weakness and numbness. Negative for dizziness, tremors, seizures, syncope, light-headedness and headaches.   Hematological:  Negative for adenopathy.   Psychiatric/Behavioral:  Negative for confusion, dysphoric mood, sleep disturbance and suicidal ideas. The patient is not  "nervous/anxious.         Objective:      Vitals:    05/15/23 1453   BP: 110/80   Pulse: 78   Resp: 20   Temp: 98 °F (36.7 °C)   TempSrc: Oral   Weight: 51.9 kg (114 lb 6.4 oz)   Height: 5' 7" (1.702 m)     Physical Exam  Vitals reviewed.   Constitutional:       General: He is not in acute distress.     Appearance: Normal appearance. He is well-developed and underweight. He is ill-appearing. He is not diaphoretic.      Comments: Chronically ill appearing   HENT:      Head: Normocephalic and atraumatic.      Right Ear: Hearing and external ear normal.      Left Ear: Hearing and external ear normal.      Nose: Nose normal.      Mouth/Throat:      Lips: Pink.      Mouth: Mucous membranes are moist.   Eyes:      General: Lids are normal. No scleral icterus.        Right eye: No discharge.         Left eye: No discharge.      Extraocular Movements: Extraocular movements intact.      Conjunctiva/sclera: Conjunctivae normal.      Pupils: Pupils are equal, round, and reactive to light.   Neck:      Thyroid: No thyroid mass or thyromegaly.      Vascular: No carotid bruit.      Trachea: Trachea and phonation normal. No tracheal deviation.   Cardiovascular:      Rate and Rhythm: Normal rate and regular rhythm.      Pulses: Normal pulses.      Heart sounds: Normal heart sounds. No murmur heard.    No friction rub. No gallop.   Pulmonary:      Effort: Pulmonary effort is normal. No respiratory distress.      Breath sounds: Normal breath sounds. No stridor. No decreased breath sounds, wheezing, rhonchi or rales.   Abdominal:      General: Bowel sounds are normal.      Palpations: Abdomen is soft.      Tenderness: There is no abdominal tenderness.   Musculoskeletal:         General: Normal range of motion.      Cervical back: Full passive range of motion without pain, normal range of motion and neck supple.      Right lower leg: No edema.      Left lower leg: No edema.   Lymphadenopathy:      Cervical: No cervical adenopathy.      " Upper Body:      Right upper body: No supraclavicular adenopathy.      Left upper body: No supraclavicular adenopathy.   Skin:     General: Skin is warm and dry.      Capillary Refill: Capillary refill takes less than 2 seconds.      Findings: No rash.   Neurological:      General: No focal deficit present.      Mental Status: He is alert and oriented to person, place, and time.      Coordination: Coordination is intact.      Gait: Gait is intact.   Psychiatric:         Attention and Perception: Attention and perception normal.         Mood and Affect: Mood and affect normal.         Speech: Speech normal.         Behavior: Behavior normal. Behavior is cooperative.         Thought Content: Thought content normal. Thought content does not include suicidal plan.         Cognition and Memory: Cognition and memory normal.         Judgment: Judgment normal.         Assessment:       1. Uncontrolled type 2 diabetes mellitus with hyperglycemia    2. Diabetic peripheral neuropathy    3. Medical non-compliance    4. Encounter for hepatitis C screening test for low risk patient    5. Screening for HIV without presence of risk factors           Plan:       Uncontrolled type 2 diabetes mellitus with hyperglycemia  -     Hemoglobin A1C; Future; Expected date: 05/15/2023  -     insulin (LANTUS SOLOSTAR U-100 INSULIN) glargine 100 units/mL SubQ pen; Inject 28 Units into the skin every evening.  Dispense: 6 mL; Refill: 1  -     metFORMIN (GLUCOPHAGE) 500 MG tablet; Take 2 tablets (1,000 mg total) by mouth 2 (two) times daily with meals.  Dispense: 120 tablet; Refill: 3  -     atorvastatin (LIPITOR) 10 MG tablet; Take 1 tablet (10 mg total) by mouth every evening.  Dispense: 30 tablet; Refill: 3  -     Comprehensive Metabolic Panel; Future; Expected date: 05/15/2023  -     CBC auto differential; Future; Expected date: 05/15/2023    Diabetic peripheral neuropathy  -     gabapentin (NEURONTIN) 300 MG capsule; Take 1 capsule (300 mg  total) by mouth 2 (two) times daily.  Dispense: 60 capsule; Refill: 1    Medical non-compliance  -     metFORMIN (GLUCOPHAGE) 500 MG tablet; Take 2 tablets (1,000 mg total) by mouth 2 (two) times daily with meals.  Dispense: 120 tablet; Refill: 3  -     atorvastatin (LIPITOR) 10 MG tablet; Take 1 tablet (10 mg total) by mouth every evening.  Dispense: 30 tablet; Refill: 3  -     Comprehensive Metabolic Panel; Future; Expected date: 05/15/2023    Encounter for hepatitis C screening test for low risk patient  -     Hepatitis C Antibody; Future; Expected date: 05/15/2023    Screening for HIV without presence of risk factors  -     HIV 1/2 Ag/Ab (4th Gen); Future; Expected date: 05/15/2023      Follow up in about 3 months (around 8/15/2023), or if symptoms worsen or fail to improve, for DM/HLD talk to patient about labs possible ED treatment?.        5/15/2023 Aletha Henson NP

## 2023-09-07 ENCOUNTER — HOSPITAL ENCOUNTER (EMERGENCY)
Facility: HOSPITAL | Age: 48
Discharge: HOME | End: 2023-09-07
Attending: EMERGENCY MEDICINE
Payer: MEDICAID

## 2023-09-07 VITALS
HEIGHT: 67 IN | WEIGHT: 115 LBS | SYSTOLIC BLOOD PRESSURE: 169 MMHG | DIASTOLIC BLOOD PRESSURE: 97 MMHG | HEART RATE: 95 BPM | RESPIRATION RATE: 18 BRPM | OXYGEN SATURATION: 97 % | TEMPERATURE: 98 F | BODY MASS INDEX: 18.05 KG/M2

## 2023-09-07 DIAGNOSIS — L02.415 CUTANEOUS ABSCESS OF RIGHT LOWER EXTREMITY: Primary | ICD-10-CM

## 2023-09-07 PROCEDURE — 96365 THER/PROPH/DIAG IV INF INIT: CPT | Mod: 59

## 2023-09-07 PROCEDURE — 25000003 PHARM REV CODE 250: Performed by: EMERGENCY MEDICINE

## 2023-09-07 PROCEDURE — 10061 I&D ABSCESS COMP/MULTIPLE: CPT

## 2023-09-07 PROCEDURE — 63600175 PHARM REV CODE 636 W HCPCS: Performed by: EMERGENCY MEDICINE

## 2023-09-07 PROCEDURE — 99284 EMERGENCY DEPT VISIT MOD MDM: CPT | Mod: 25

## 2023-09-07 RX ORDER — MUPIROCIN 20 MG/G
1 OINTMENT TOPICAL
Status: COMPLETED | OUTPATIENT
Start: 2023-09-07 | End: 2023-09-07

## 2023-09-07 RX ORDER — LIDOCAINE HYDROCHLORIDE 10 MG/ML
10 INJECTION INFILTRATION; PERINEURAL
Status: COMPLETED | OUTPATIENT
Start: 2023-09-07 | End: 2023-09-07

## 2023-09-07 RX ORDER — SULFAMETHOXAZOLE AND TRIMETHOPRIM 800; 160 MG/1; MG/1
1 TABLET ORAL 2 TIMES DAILY
Qty: 20 TABLET | Refills: 0 | Status: SHIPPED | OUTPATIENT
Start: 2023-09-07 | End: 2023-09-17

## 2023-09-07 RX ORDER — SULFAMETHOXAZOLE AND TRIMETHOPRIM 800; 160 MG/1; MG/1
2 TABLET ORAL
Status: COMPLETED | OUTPATIENT
Start: 2023-09-07 | End: 2023-09-07

## 2023-09-07 RX ADMIN — SULFAMETHOXAZOLE AND TRIMETHOPRIM 2 TABLET: 800; 160 TABLET ORAL at 05:09

## 2023-09-07 RX ADMIN — LIDOCAINE HYDROCHLORIDE 10 ML: 10 INJECTION, SOLUTION INFILTRATION; PERINEURAL at 05:09

## 2023-09-07 RX ADMIN — MUPIROCIN 22 G: 20 OINTMENT TOPICAL at 05:09

## 2023-09-07 RX ADMIN — VANCOMYCIN HYDROCHLORIDE 1000 MG: 1 INJECTION, POWDER, LYOPHILIZED, FOR SOLUTION INTRAVENOUS at 05:09

## 2023-09-07 NOTE — ED NOTES
Pt c/o wound to right distal thigh x 4 days. Pt reports that he had an infected hair or irritation in the area and that his pants get wet with sweat at work and rub his legs. Pt reports he gets these wounds and that they normally will drain and then scab over. Pt reports this one had his right thigh and knee swollen. Pt reports he did keep squeezing the wound to drain it. The wound to right thigh is noted to have a yellow/white wound bed w/ no drainage, skin area surrounding the wound is noted hot, red and swollen. Photos in media.

## 2023-09-07 NOTE — ED PROVIDER NOTES
Encounter Date: 9/7/2023       History     Chief Complaint   Patient presents with    Wound Infection     Pt stated he has a wound infection on his right thigh area just above right knee. Pt stated the wound has been there for about 4 days.      Patient is a 48-year-old male with a past medical history of type 2 diabetes prior opioid abuse disorder dyslipidemia who presents the emergency room for evaluation of a wound over his right proximal anterior distal thigh.  It has been present for the past 3-4 days.  He was able to express some pus drainage from it but it is becoming more painful.  He has no systemic symptoms such as fevers nausea vomiting malaise.  He denies any weakness or numbness.  He denies any IV drug use.  He is a smoker.  He denies this being a puncture wound.  He states he is up-to-date on his tetanus shot      Review of patient's allergies indicates:  No Known Allergies  Past Medical History:   Diagnosis Date    COVID-19     around june 2022, per patient/wife    Diabetes mellitus, type 2     Dyslipidemia     Opioid abuse      Past Surgical History:   Procedure Laterality Date    INCISION AND DRAINAGE OF ABSCESS N/A 9/16/2022    Procedure: INCISION AND DRAINAGE, ABSCESS- BACK;  Surgeon: Glenn Fraser MD;  Location: [facility] OR;  Service: General;  Laterality: N/A;     Family History   Problem Relation Age of Onset    Diabetes Mother     Hypertension Father     Heart disease Father     Diabetes Father      Social History     Tobacco Use    Smoking status: Every Day     Current packs/day: 1.00     Average packs/day: 1 pack/day for 28.0 years (28.0 ttl pk-yrs)     Types: Cigarettes    Smokeless tobacco: Never   Substance Use Topics    Alcohol use: Not Currently    Drug use: Not Currently     Types: Marijuana, Other-see comments     Comment: pain medications/opioids     Review of Systems   Constitutional:  Negative for fever.   HENT:  Negative for ear pain, rhinorrhea and sore throat.    Eyes:   Negative for pain and visual disturbance.   Respiratory:  Negative for cough and shortness of breath.    Cardiovascular:  Negative for chest pain.   Gastrointestinal:  Negative for abdominal pain, diarrhea, nausea and vomiting.   Genitourinary:  Negative for difficulty urinating.   Musculoskeletal:  Negative for arthralgias.   Skin:  Negative for rash.        Abscess over the right distal anterior thigh.  Also has several other nonhealing wounds on his extremities which he attributes to work as a    Neurological:  Negative for weakness, numbness and headaches.   All other systems reviewed and are negative.      Physical Exam     Initial Vitals [09/07/23 0417]   BP Pulse Resp Temp SpO2   135/75 (!) 116 14 98 °F (36.7 °C) 98 %      MAP       --         Physical Exam    Nursing note and vitals reviewed.  Constitutional: He appears well-developed and well-nourished. No distress.   HENT:   Head: Normocephalic and atraumatic.   Mouth/Throat: Oropharynx is clear and moist.   Eyes: Conjunctivae and EOM are normal. Pupils are equal, round, and reactive to light.   Neck: Neck supple.   Cardiovascular:  Normal rate, regular rhythm, normal heart sounds and intact distal pulses.           Pulmonary/Chest: Breath sounds normal. He has no wheezes. He has no rhonchi. He has no rales.   Abdominal: Abdomen is soft.   Musculoskeletal:         General: No edema.      Cervical back: Neck supple.     Neurological: He is alert and oriented to person, place, and time. He has normal strength. No sensory deficit.   Skin: Capillary refill takes less than 2 seconds.   Patient has a firm erythematous tender lesion over the right anterior distal thigh that is approximately 5 cm x 4 cm.  There is a central tender lesion that is not draining any pus at this time.    Patient has multiple ulcerations on his upper and lower extremities that appear to be in different stages and excoriations.  No them appear to be cellulitic.  There are no  other abscesses present         ED Course   I & D - Incision and Drainage    Date/Time: 9/7/2023 4:07 AM  Location procedure was performed: [facility] EMERGENCY DEPARTMENT    Performed by: Nixon Beckman MD  Authorized by: Nixon Beckman MD  Consent Done: Not Needed  Type: abscess  Body area: lower extremity  Location details: right leg  Anesthesia: local infiltration    Anesthesia:  Local Anesthetic: lidocaine 1% without epinephrine  Anesthetic total: 7 mL    Patient sedated: no  Scalpel size: 11  Incision type: single straight  Incision depth: subcutaneous  Complexity: complex  Drainage: pus  Drainage amount: moderate  Wound treatment: incision, deloculation, expression of material and wound packed  Packing material: 1/2 in gauze  Complications: No  Patient tolerance: Patient tolerated the procedure well with no immediate complications    Incision depth: subcutaneous        Labs Reviewed - No data to display       Imaging Results    None          Medications   vancomycin (VANCOCIN) 1,000 mg in dextrose 5 % (D5W) 250 mL IVPB (Vial-Mate) (1,000 mg Intravenous New Bag 9/7/23 0528)   LIDOcaine HCL 10 mg/ml (1%) injection 10 mL (10 mLs Infiltration Given by Other 9/7/23 0528)   sulfamethoxazole-trimethoprim 800-160mg per tablet 2 tablet (2 tablets Oral Given 9/7/23 0522)   mupirocin 2 % ointment 22 g (22 g Topical (Top) Given by Other 9/7/23 0529)     Medical Decision Making  Patient is a type 2 diabetic.  He has not abscess over the right distal anterior thigh.  I performed incision and drainage.  He is not febrile.  Slight tachycardia.  He was given vancomycin and Bactrim here in the emergency department.  I will send him home on Bactrim and Bactroban.  I have given very specific return precautions for worsening infection or systemic symptoms.  He has his diabetes medications.  He denies any significant shortness breath polyuria polydipsia.  He appears very well.  Stable for discharge at this  time.    Risk  Prescription drug management.                               Clinical Impression:   Final diagnoses:  [L02.415] Cutaneous abscess of right lower extremity (Primary)        ED Disposition Condition    Discharge Stable          ED Prescriptions       Medication Sig Dispense Start Date End Date Auth. Provider    sulfamethoxazole-trimethoprim 800-160mg (BACTRIM DS) 800-160 mg Tab Take 1 tablet by mouth 2 (two) times daily. for 10 days 20 tablet 9/7/2023 9/17/2023 Nixon Beckman MD          Follow-up Information    None          Nixon Beckman MD  09/07/23 0696

## 2024-01-26 ENCOUNTER — HOSPITAL ENCOUNTER (INPATIENT)
Facility: HOSPITAL | Age: 49
LOS: 6 days | Discharge: HOME | DRG: 629 | End: 2024-02-01
Attending: EMERGENCY MEDICINE | Admitting: INTERNAL MEDICINE
Payer: MEDICAID

## 2024-01-26 ENCOUNTER — OFFICE VISIT (OUTPATIENT)
Dept: FAMILY MEDICINE | Facility: CLINIC | Age: 49
End: 2024-01-26
Payer: MEDICAID

## 2024-01-26 VITALS
HEIGHT: 67 IN | BODY MASS INDEX: 17.89 KG/M2 | TEMPERATURE: 98 F | OXYGEN SATURATION: 99 % | SYSTOLIC BLOOD PRESSURE: 130 MMHG | WEIGHT: 114 LBS | RESPIRATION RATE: 18 BRPM | HEART RATE: 116 BPM | DIASTOLIC BLOOD PRESSURE: 78 MMHG

## 2024-01-26 DIAGNOSIS — L97.513 ULCER OF RIGHT FOOT WITH NECROSIS OF MUSCLE: ICD-10-CM

## 2024-01-26 DIAGNOSIS — E10.65 UNCONTROLLED TYPE 1 DIABETES MELLITUS WITH HYPERGLYCEMIA: ICD-10-CM

## 2024-01-26 DIAGNOSIS — L02.611 ABSCESS OF RIGHT FOOT: ICD-10-CM

## 2024-01-26 DIAGNOSIS — E11.65 TYPE 2 DIABETES MELLITUS WITH HYPERGLYCEMIA, WITH LONG-TERM CURRENT USE OF INSULIN: Primary | ICD-10-CM

## 2024-01-26 DIAGNOSIS — E46 MALNUTRITION, UNSPECIFIED TYPE: ICD-10-CM

## 2024-01-26 DIAGNOSIS — F17.200 TOBACCO DEPENDENCE: ICD-10-CM

## 2024-01-26 DIAGNOSIS — M79.671 RIGHT FOOT PAIN: ICD-10-CM

## 2024-01-26 DIAGNOSIS — Z79.4 TYPE 2 DIABETES MELLITUS WITH HYPERGLYCEMIA, WITH LONG-TERM CURRENT USE OF INSULIN: Primary | ICD-10-CM

## 2024-01-26 DIAGNOSIS — T25.222A PARTIAL THICKNESS BURN OF LEFT FOOT, INITIAL ENCOUNTER: ICD-10-CM

## 2024-01-26 DIAGNOSIS — L03.90 CELLULITIS, UNSPECIFIED CELLULITIS SITE: ICD-10-CM

## 2024-01-26 DIAGNOSIS — M86.271 SUBACUTE OSTEOMYELITIS OF RIGHT FOOT: ICD-10-CM

## 2024-01-26 DIAGNOSIS — R03.0 BLOOD PRESSURE ELEVATED WITHOUT HISTORY OF HTN: ICD-10-CM

## 2024-01-26 DIAGNOSIS — R07.9 CHEST PAIN: ICD-10-CM

## 2024-01-26 DIAGNOSIS — T14.8XXA PUNCTURE WOUND: ICD-10-CM

## 2024-01-26 DIAGNOSIS — L03.90 CELLULITIS, UNSPECIFIED CELLULITIS SITE: Primary | ICD-10-CM

## 2024-01-26 DIAGNOSIS — M79.673 FOOT PAIN: ICD-10-CM

## 2024-01-26 DIAGNOSIS — L03.90 CELLULITIS: ICD-10-CM

## 2024-01-26 DIAGNOSIS — E78.5 DYSLIPIDEMIA: ICD-10-CM

## 2024-01-26 LAB
ALBUMIN SERPL BCP-MCNC: 3.6 G/DL (ref 3.5–5.2)
ALP SERPL-CCNC: 139 U/L (ref 55–135)
ALT SERPL W/O P-5'-P-CCNC: 12 U/L (ref 10–44)
AMPHET+METHAMPHET UR QL: NEGATIVE
ANION GAP SERPL CALC-SCNC: 13 MMOL/L (ref 8–16)
AST SERPL-CCNC: 17 U/L (ref 10–40)
B-OH-BUTYR BLD STRIP-SCNC: 2.6 MMOL/L (ref 0–0.5)
BACTERIA #/AREA URNS HPF: NEGATIVE /HPF
BARBITURATES UR QL SCN>200 NG/ML: NEGATIVE
BASOPHILS # BLD AUTO: 0.06 K/UL (ref 0–0.2)
BASOPHILS NFR BLD: 0.3 % (ref 0–1.9)
BENZODIAZ UR QL SCN>200 NG/ML: NEGATIVE
BILIRUB SERPL-MCNC: 0.5 MG/DL (ref 0.1–1)
BILIRUB UR QL STRIP: NEGATIVE
BUN SERPL-MCNC: 17 MG/DL (ref 6–20)
BZE UR QL SCN: NEGATIVE
CALCIUM SERPL-MCNC: 9.4 MG/DL (ref 8.7–10.5)
CANNABINOIDS UR QL SCN: NEGATIVE
CHLORIDE SERPL-SCNC: 88 MMOL/L (ref 95–110)
CLARITY UR: CLEAR
CO2 SERPL-SCNC: 26 MMOL/L (ref 23–29)
COLOR UR: COLORLESS
CREAT SERPL-MCNC: 0.8 MG/DL (ref 0.5–1.4)
CREAT UR-MCNC: 13.5 MG/DL (ref 23–375)
DIFFERENTIAL METHOD BLD: ABNORMAL
EOSINOPHIL # BLD AUTO: 0.1 K/UL (ref 0–0.5)
EOSINOPHIL NFR BLD: 0.6 % (ref 0–8)
ERYTHROCYTE [DISTWIDTH] IN BLOOD BY AUTOMATED COUNT: 13.1 % (ref 11.5–14.5)
EST. GFR  (NO RACE VARIABLE): >60 ML/MIN/1.73 M^2
ETHANOL SERPL-MCNC: <10 MG/DL
GLUCOSE SERPL-MCNC: 333 MG/DL (ref 70–110)
GLUCOSE SERPL-MCNC: 346 MG/DL (ref 70–110)
GLUCOSE SERPL-MCNC: 395 MG/DL (ref 70–110)
GLUCOSE SERPL-MCNC: 399 MG/DL (ref 70–110)
GLUCOSE SERPL-MCNC: 485 MG/DL (ref 70–110)
GLUCOSE SERPL-MCNC: 528 MG/DL (ref 70–110)
GLUCOSE SERPL-MCNC: 529 MG/DL (ref 70–110)
GLUCOSE UR QL STRIP: ABNORMAL
HCT VFR BLD AUTO: 39.8 % (ref 40–54)
HGB BLD-MCNC: 12.7 G/DL (ref 14–18)
HGB UR QL STRIP: NEGATIVE
HYALINE CASTS #/AREA URNS LPF: 0 /LPF
IMM GRANULOCYTES # BLD AUTO: 0.11 K/UL (ref 0–0.04)
IMM GRANULOCYTES NFR BLD AUTO: 0.6 % (ref 0–0.5)
KETONES UR QL STRIP: ABNORMAL
LACTATE SERPL-SCNC: 1.6 MMOL/L (ref 0.5–1.9)
LEUKOCYTE ESTERASE UR QL STRIP: NEGATIVE
LYMPHOCYTES # BLD AUTO: 1.8 K/UL (ref 1–4.8)
LYMPHOCYTES NFR BLD: 10 % (ref 18–48)
MAGNESIUM SERPL-MCNC: 2 MG/DL (ref 1.6–2.6)
MCH RBC QN AUTO: 27.6 PG (ref 27–31)
MCHC RBC AUTO-ENTMCNC: 31.9 G/DL (ref 32–36)
MCV RBC AUTO: 87 FL (ref 82–98)
MICROSCOPIC COMMENT: ABNORMAL
MONOCYTES # BLD AUTO: 1.1 K/UL (ref 0.3–1)
MONOCYTES NFR BLD: 5.9 % (ref 4–15)
NEUTROPHILS # BLD AUTO: 15 K/UL (ref 1.8–7.7)
NEUTROPHILS NFR BLD: 82.6 % (ref 38–73)
NITRITE UR QL STRIP: NEGATIVE
NRBC BLD-RTO: 0 /100 WBC
OPIATES UR QL SCN: NEGATIVE
PCP UR QL SCN>25 NG/ML: NEGATIVE
PH UR STRIP: 6 [PH] (ref 5–8)
PLATELET # BLD AUTO: 316 K/UL (ref 150–450)
PMV BLD AUTO: 8.6 FL (ref 9.2–12.9)
POTASSIUM SERPL-SCNC: 4.7 MMOL/L (ref 3.5–5.1)
PROT SERPL-MCNC: 7.7 G/DL (ref 6–8.4)
PROT UR QL STRIP: NEGATIVE
RBC # BLD AUTO: 4.6 M/UL (ref 4.6–6.2)
RBC #/AREA URNS HPF: 0 /HPF (ref 0–4)
SODIUM SERPL-SCNC: 127 MMOL/L (ref 136–145)
SP GR UR STRIP: 1.03 (ref 1–1.03)
SQUAMOUS #/AREA URNS HPF: 0 /HPF
TOXICOLOGY INFORMATION: ABNORMAL
TROPONIN I SERPL HS-MCNC: 9.6 PG/ML (ref 0–14.9)
URN SPEC COLLECT METH UR: ABNORMAL
UROBILINOGEN UR STRIP-ACNC: NEGATIVE EU/DL
WBC # BLD AUTO: 18.18 K/UL (ref 3.9–12.7)
WBC #/AREA URNS HPF: 0 /HPF (ref 0–5)
YEAST URNS QL MICRO: ABNORMAL

## 2024-01-26 PROCEDURE — 80053 COMPREHEN METABOLIC PANEL: CPT | Performed by: EMERGENCY MEDICINE

## 2024-01-26 PROCEDURE — 82077 ASSAY SPEC XCP UR&BREATH IA: CPT | Performed by: EMERGENCY MEDICINE

## 2024-01-26 PROCEDURE — 99999 PR PBB SHADOW E&M-EST. PATIENT-LVL IV: CPT | Mod: PBBFAC,,, | Performed by: FAMILY MEDICINE

## 2024-01-26 PROCEDURE — 63600175 PHARM REV CODE 636 W HCPCS: Performed by: INTERNAL MEDICINE

## 2024-01-26 PROCEDURE — 83735 ASSAY OF MAGNESIUM: CPT | Performed by: EMERGENCY MEDICINE

## 2024-01-26 PROCEDURE — 87040 BLOOD CULTURE FOR BACTERIA: CPT | Mod: 59 | Performed by: EMERGENCY MEDICINE

## 2024-01-26 PROCEDURE — 94761 N-INVAS EAR/PLS OXIMETRY MLT: CPT

## 2024-01-26 PROCEDURE — 99285 EMERGENCY DEPT VISIT HI MDM: CPT | Mod: 25

## 2024-01-26 PROCEDURE — 82962 GLUCOSE BLOOD TEST: CPT | Mod: PBBFAC,PN | Performed by: FAMILY MEDICINE

## 2024-01-26 PROCEDURE — 80307 DRUG TEST PRSMV CHEM ANLYZR: CPT | Performed by: EMERGENCY MEDICINE

## 2024-01-26 PROCEDURE — 99214 OFFICE O/P EST MOD 30 MIN: CPT | Mod: S$PBB,,, | Performed by: FAMILY MEDICINE

## 2024-01-26 PROCEDURE — 84484 ASSAY OF TROPONIN QUANT: CPT | Performed by: EMERGENCY MEDICINE

## 2024-01-26 PROCEDURE — 63600175 PHARM REV CODE 636 W HCPCS: Performed by: EMERGENCY MEDICINE

## 2024-01-26 PROCEDURE — 93005 ELECTROCARDIOGRAM TRACING: CPT | Performed by: GENERAL PRACTICE

## 2024-01-26 PROCEDURE — 36415 COLL VENOUS BLD VENIPUNCTURE: CPT | Performed by: INTERNAL MEDICINE

## 2024-01-26 PROCEDURE — 25000003 PHARM REV CODE 250: Performed by: EMERGENCY MEDICINE

## 2024-01-26 PROCEDURE — 3008F BODY MASS INDEX DOCD: CPT | Mod: CPTII,,, | Performed by: FAMILY MEDICINE

## 2024-01-26 PROCEDURE — 99214 OFFICE O/P EST MOD 30 MIN: CPT | Mod: PBBFAC,PN | Performed by: FAMILY MEDICINE

## 2024-01-26 PROCEDURE — 96365 THER/PROPH/DIAG IV INF INIT: CPT

## 2024-01-26 PROCEDURE — 93010 ELECTROCARDIOGRAM REPORT: CPT | Mod: ,,, | Performed by: GENERAL PRACTICE

## 2024-01-26 PROCEDURE — 83036 HEMOGLOBIN GLYCOSYLATED A1C: CPT | Performed by: INTERNAL MEDICINE

## 2024-01-26 PROCEDURE — 81001 URINALYSIS AUTO W/SCOPE: CPT | Performed by: EMERGENCY MEDICINE

## 2024-01-26 PROCEDURE — 25000003 PHARM REV CODE 250: Performed by: INTERNAL MEDICINE

## 2024-01-26 PROCEDURE — 83605 ASSAY OF LACTIC ACID: CPT | Performed by: EMERGENCY MEDICINE

## 2024-01-26 PROCEDURE — 94760 N-INVAS EAR/PLS OXIMETRY 1: CPT

## 2024-01-26 PROCEDURE — 99999PBSHW POCT GLUCOSE, HAND-HELD DEVICE: Mod: PBBFAC,,,

## 2024-01-26 PROCEDURE — 82010 KETONE BODYS QUAN: CPT | Performed by: EMERGENCY MEDICINE

## 2024-01-26 PROCEDURE — 3075F SYST BP GE 130 - 139MM HG: CPT | Mod: CPTII,,, | Performed by: FAMILY MEDICINE

## 2024-01-26 PROCEDURE — 25500020 PHARM REV CODE 255: Performed by: INTERNAL MEDICINE

## 2024-01-26 PROCEDURE — 3078F DIAST BP <80 MM HG: CPT | Mod: CPTII,,, | Performed by: FAMILY MEDICINE

## 2024-01-26 PROCEDURE — 12000002 HC ACUTE/MED SURGE SEMI-PRIVATE ROOM

## 2024-01-26 PROCEDURE — 1159F MED LIST DOCD IN RCRD: CPT | Mod: CPTII,,, | Performed by: FAMILY MEDICINE

## 2024-01-26 PROCEDURE — 85025 COMPLETE CBC W/AUTO DIFF WBC: CPT | Performed by: EMERGENCY MEDICINE

## 2024-01-26 PROCEDURE — 96375 TX/PRO/DX INJ NEW DRUG ADDON: CPT

## 2024-01-26 RX ORDER — METRONIDAZOLE 500 MG/100ML
500 INJECTION, SOLUTION INTRAVENOUS
Status: DISCONTINUED | OUTPATIENT
Start: 2024-01-26 | End: 2024-01-30

## 2024-01-26 RX ORDER — SODIUM CHLORIDE 9 MG/ML
1000 INJECTION, SOLUTION INTRAVENOUS
Status: COMPLETED | OUTPATIENT
Start: 2024-01-26 | End: 2024-01-26

## 2024-01-26 RX ORDER — IBUPROFEN 200 MG
16 TABLET ORAL
Status: DISCONTINUED | OUTPATIENT
Start: 2024-01-26 | End: 2024-01-28

## 2024-01-26 RX ORDER — ACETAMINOPHEN 325 MG/1
650 TABLET ORAL EVERY 4 HOURS PRN
Status: DISCONTINUED | OUTPATIENT
Start: 2024-01-26 | End: 2024-02-01 | Stop reason: HOSPADM

## 2024-01-26 RX ORDER — GLUCAGON 1 MG
1 KIT INJECTION
Status: DISCONTINUED | OUTPATIENT
Start: 2024-01-26 | End: 2024-01-28

## 2024-01-26 RX ORDER — SODIUM CHLORIDE 9 MG/ML
INJECTION, SOLUTION INTRAVENOUS CONTINUOUS
Status: DISCONTINUED | OUTPATIENT
Start: 2024-01-26 | End: 2024-01-28

## 2024-01-26 RX ORDER — ACETAMINOPHEN 325 MG/1
650 TABLET ORAL EVERY 8 HOURS PRN
Status: DISCONTINUED | OUTPATIENT
Start: 2024-01-26 | End: 2024-02-01 | Stop reason: HOSPADM

## 2024-01-26 RX ORDER — TALC
6 POWDER (GRAM) TOPICAL NIGHTLY PRN
Status: DISCONTINUED | OUTPATIENT
Start: 2024-01-26 | End: 2024-02-01 | Stop reason: HOSPADM

## 2024-01-26 RX ORDER — NAPROXEN SODIUM 220 MG/1
81 TABLET, FILM COATED ORAL ONCE
Status: COMPLETED | OUTPATIENT
Start: 2024-01-26 | End: 2024-01-26

## 2024-01-26 RX ORDER — SODIUM CHLORIDE 0.9 % (FLUSH) 0.9 %
10 SYRINGE (ML) INJECTION EVERY 12 HOURS PRN
Status: DISCONTINUED | OUTPATIENT
Start: 2024-01-26 | End: 2024-02-01 | Stop reason: HOSPADM

## 2024-01-26 RX ORDER — IBUPROFEN 200 MG
16 TABLET ORAL
Status: DISCONTINUED | OUTPATIENT
Start: 2024-01-26 | End: 2024-02-01 | Stop reason: HOSPADM

## 2024-01-26 RX ORDER — INSULIN ASPART 100 [IU]/ML
0-10 INJECTION, SOLUTION INTRAVENOUS; SUBCUTANEOUS
Status: DISCONTINUED | OUTPATIENT
Start: 2024-01-26 | End: 2024-02-01 | Stop reason: HOSPADM

## 2024-01-26 RX ORDER — MIRTAZAPINE 15 MG/1
15 TABLET, FILM COATED ORAL NIGHTLY
COMMUNITY
Start: 2023-11-22

## 2024-01-26 RX ORDER — ONDANSETRON HYDROCHLORIDE 2 MG/ML
4 INJECTION, SOLUTION INTRAVENOUS EVERY 8 HOURS PRN
Status: DISCONTINUED | OUTPATIENT
Start: 2024-01-26 | End: 2024-02-01 | Stop reason: HOSPADM

## 2024-01-26 RX ORDER — IBUPROFEN 200 MG
24 TABLET ORAL
Status: DISCONTINUED | OUTPATIENT
Start: 2024-01-26 | End: 2024-02-01 | Stop reason: HOSPADM

## 2024-01-26 RX ORDER — ATORVASTATIN CALCIUM 40 MG/1
40 TABLET, FILM COATED ORAL NIGHTLY
Status: DISCONTINUED | OUTPATIENT
Start: 2024-01-26 | End: 2024-02-01 | Stop reason: HOSPADM

## 2024-01-26 RX ORDER — HYDROCODONE BITARTRATE AND ACETAMINOPHEN 5; 325 MG/1; MG/1
1 TABLET ORAL EVERY 6 HOURS PRN
Status: DISCONTINUED | OUTPATIENT
Start: 2024-01-26 | End: 2024-02-01 | Stop reason: HOSPADM

## 2024-01-26 RX ORDER — IBUPROFEN 200 MG
24 TABLET ORAL
Status: DISCONTINUED | OUTPATIENT
Start: 2024-01-26 | End: 2024-01-28

## 2024-01-26 RX ORDER — NALOXONE HCL 0.4 MG/ML
0.02 VIAL (ML) INJECTION
Status: DISCONTINUED | OUTPATIENT
Start: 2024-01-26 | End: 2024-02-01 | Stop reason: HOSPADM

## 2024-01-26 RX ORDER — GLUCAGON 1 MG
1 KIT INJECTION
Status: DISCONTINUED | OUTPATIENT
Start: 2024-01-26 | End: 2024-02-01 | Stop reason: HOSPADM

## 2024-01-26 RX ORDER — ENOXAPARIN SODIUM 100 MG/ML
40 INJECTION SUBCUTANEOUS EVERY 24 HOURS
Status: DISCONTINUED | OUTPATIENT
Start: 2024-01-26 | End: 2024-02-01 | Stop reason: HOSPADM

## 2024-01-26 RX ADMIN — ENOXAPARIN SODIUM 40 MG: 40 INJECTION SUBCUTANEOUS at 05:01

## 2024-01-26 RX ADMIN — CEFEPIME 2 G: 2 INJECTION, POWDER, FOR SOLUTION INTRAVENOUS at 09:01

## 2024-01-26 RX ADMIN — PIPERACILLIN SODIUM,TAZOBACTAM SODIUM 3.38 G: 3; .375 INJECTION, POWDER, FOR SOLUTION INTRAVENOUS at 12:01

## 2024-01-26 RX ADMIN — INSULIN DETEMIR 22 UNITS: 100 INJECTION, SOLUTION SUBCUTANEOUS at 03:01

## 2024-01-26 RX ADMIN — ASPIRIN 81 MG 81 MG: 81 TABLET ORAL at 09:01

## 2024-01-26 RX ADMIN — VANCOMYCIN HYDROCHLORIDE 1250 MG: 1.25 INJECTION, POWDER, LYOPHILIZED, FOR SOLUTION INTRAVENOUS at 03:01

## 2024-01-26 RX ADMIN — INSULIN ASPART 10 UNITS: 100 INJECTION, SOLUTION INTRAVENOUS; SUBCUTANEOUS at 05:01

## 2024-01-26 RX ADMIN — METRONIDAZOLE 500 MG: 500 INJECTION, SOLUTION INTRAVENOUS at 10:01

## 2024-01-26 RX ADMIN — MULTIVITAMIN TABLET 1 TABLET: TABLET at 02:01

## 2024-01-26 RX ADMIN — SODIUM CHLORIDE 1000 ML: 9 INJECTION, SOLUTION INTRAVENOUS at 12:01

## 2024-01-26 RX ADMIN — INSULIN HUMAN 8 UNITS: 100 INJECTION, SOLUTION PARENTERAL at 12:01

## 2024-01-26 RX ADMIN — INSULIN ASPART 5 UNITS: 100 INJECTION, SOLUTION INTRAVENOUS; SUBCUTANEOUS at 10:01

## 2024-01-26 RX ADMIN — IOHEXOL 100 ML: 350 INJECTION, SOLUTION INTRAVENOUS at 05:01

## 2024-01-26 RX ADMIN — ATORVASTATIN CALCIUM 40 MG: 40 TABLET, FILM COATED ORAL at 09:01

## 2024-01-26 NOTE — NURSING
Nurses Note -- 4 Eyes      1/26/2024   5:37 PM      Skin assessed during: Admit      [] No Altered Skin Integrity Present    []Prevention Measures Documented      [x] Yes- Altered Skin Integrity Present or Discovered   [] LDA Added if Not in Epic (Describe Wound)   [] New Altered Skin Integrity was Present on Admit and Documented in LDA   [] Wound Image Taken    Wound Care Consulted? Yes    Attending Nurse:  Shanelle Santamaria RN/Staff Member:     Elif tavera
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
## 2024-01-26 NOTE — Clinical Note
The 6 x 80 mustang balloon was inderted into the left fistular, inflated, 10atm at 30 sec.  10 thu t 20sec, 10 thu at 30sec. 10 thu at 40sec.The balloon wa removed

## 2024-01-26 NOTE — PROGRESS NOTES
Sergio Porter [id number] is a 48 y.o. male who has been consulted for vancomycin dosing.    The patient has the following labs:     Date Creatinine (mg/dl)    BUN WBC Count   1/26/2024 Estimated Creatinine Clearance: 84.8 mL/min (based on SCr of 0.8 mg/dL). Lab Results   Component Value Date    BUN 17 01/26/2024     Lab Results   Component Value Date    WBC 18.18 (H) 01/26/2024        Current weight is 53.1 kg (117 lb)    Pt being treated with vancomycin for Skin Soft Tissue Infection..    The patient will be started on Vancomycin at a dose of 1250 mg Loading dose followed by Vancomycin 750 mg Q12h every 12 hours (14 mg/kg/dose). The vancomycin trough has been ordered for 1/28/24 at 0030.  Target goal is 10-15.     Patient will be followed by pharmacy for changes in renal function, toxicity, and efficacy.        Thank you for allowing us to participate in this patient's care.     Kristin Quiroz, PharmD

## 2024-01-26 NOTE — PROGRESS NOTES
Bilateral arterial ultrasound is abnormal, we will get CTA abdomen pelvis with runoff and vascular surgery consult

## 2024-01-26 NOTE — Clinical Note
The catheter was inserted into the  distal infrarenal abdominal aorta. Angio performed AO / BILAT ILIACS.
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
## 2024-01-26 NOTE — PHARMACY MED REC
"Admission Medication History     The home medication history was taken by Joel Burciaga.    You may go to "Admission" then "Reconcile Home Medications" tabs to review and/or act upon these items.     The home medication list has been updated by the Pharmacy department.   Please read ALL comments highlighted in yellow.   Please address this information as you see fit.    Feel free to contact us if you have any questions or require assistance.        Medications listed below were obtained from: Patient/family and Analytic software- [employer]  No current facility-administered medications on file prior to encounter.     Current Outpatient Medications on File Prior to Encounter   Medication Sig Dispense Refill    buprenorphine-naloxone 8-2 mg (SUBOXONE) 8-2 mg Place 1 Film under the tongue once daily. Per patient      ciprofloxacin HCl (CIPRO) 750 MG tablet Take 1 tablet (750 mg total) by mouth 2 (two) times daily. for 5 days 10 tablet 0    atorvastatin (LIPITOR) 10 MG tablet Take 1 tablet (10 mg total) by mouth every evening. 30 tablet 3    BD KEVIN 2ND GEN PEN NEEDLE 32 gauge x 5/32" Ndle USE ONCE EVERY EVENING must last 90 days      blood sugar diagnostic Strp To check BG 3 times daily, to use with insurance preferred meter  Dx E 11.65 100 each 1    blood-glucose meter kit To check BG 3 times daily, to use with insurance preferred meter DX E 11.65 1 each 1    insulin (LANTUS SOLOSTAR U-100 INSULIN) glargine 100 units/mL SubQ pen Inject 28 Units into the skin every evening. (Patient not taking: Reported on 1/26/2024) 6 mL 1    lancets Misc To check BG 3 times daily, to use with insurance preferred meter Dx E 11.65 100 each 1    metFORMIN (GLUCOPHAGE) 500 MG tablet Take 2 tablets (1,000 mg total) by mouth 2 (two) times daily with meals. 120 tablet 3    mirtazapine (REMERON) 15 MG tablet Take 15 mg by mouth every evening.      [DISCONTINUED] gabapentin (NEURONTIN) 300 MG capsule Take 1 capsule (300 mg total) by mouth 2 " (two) times daily. (Patient not taking: Reported on 1/26/2024) 60 capsule 1    [DISCONTINUED] sodium hypochlorite 0.125% (DAKIN'S SOLUTION) external solution Apply topically 2 (two) times daily. (Patient not taking: Reported on 1/26/2024) 473 mL 1       Potential issues to be addressed PRIOR TO DISCHARGE  Please discuss with the patient barriers to adherence with medication treatment plans  Patient requires education regarding drug therapies     Joel Burciaga  [id number]                .

## 2024-01-26 NOTE — Clinical Note
The catheter was inserted into the  middle right common iliac arteries. STENT DEPLOYED; DEVICE REMOVED.

## 2024-01-26 NOTE — Clinical Note
The balloon was inserted into the left AV fistula. Balloon inflated 20atm at 30sec  Balloon removed.

## 2024-01-26 NOTE — Clinical Note
The catheter was inserted into the left AV fistula. Balloon inflated 12 thu at 30sec.  Balloon removed.

## 2024-01-26 NOTE — Clinical Note
120 ml of contrast were injected throughout the case. 50 mL of contrast was the total wasted during the case. 170 mL was the total amount used during the case.

## 2024-01-26 NOTE — ED PROVIDER NOTES
Encounter Date: 1/26/2024       History     Chief Complaint   Patient presents with    Hyperglycemia     Reports not having access to his insulin due to his insurance stopping at first of the year     Foot Burn     Reports occurred over 2 weeks ago     Hand Burn    Foot Injury     Reports stepping on a nail last Thursday      40-year-old male sent to emergency room from Dr. Putnam's office for evaluation of an elevated blood sugar.  The patient had apparently run out of his medication an undetermined period of time.  Patient has a history also of prior opioid abuse although he denies any drug use currently.  He has a history of dyslipidemia.  Patient additionally relates that he sustained burns to his hand and feet almost 2 weeks ago and saw no evaluation.  Additionally admits that he stepped on a nail with his right foot and has a dime-sized wound over the plantar aspect of the foot just anterior to the calcaneus.  It was also noted that the patient has a cellulitis in his foot along with swelling.  He admits to eating normally.  He denies being homeless.  No trouble urinating.      Review of patient's allergies indicates:  No Known Allergies  Past Medical History:   Diagnosis Date    COVID-19     around june 2022, per patient/wife    Diabetes mellitus, type 2     Dyslipidemia     Opioid abuse      Past Surgical History:   Procedure Laterality Date    INCISION AND DRAINAGE OF ABSCESS N/A 9/16/2022    Procedure: INCISION AND DRAINAGE, ABSCESS- BACK;  Surgeon: Glenn Fraser MD;  Location: [facility] OR;  Service: General;  Laterality: N/A;     Family History   Problem Relation Age of Onset    Diabetes Mother     Hypertension Father     Heart disease Father     Diabetes Father      Social History     Tobacco Use    Smoking status: Every Day     Current packs/day: 1.00     Average packs/day: 1 pack/day for 28.0 years (28.0 ttl pk-yrs)     Types: Cigarettes    Smokeless tobacco: Never   Substance Use Topics     Alcohol use: Not Currently    Drug use: Not Currently     Types: Marijuana, Other-see comments     Comment: pain medications/opioids     Review of Systems   Constitutional:  Negative for activity change, appetite change, chills, diaphoresis and fever.   HENT:  Negative for congestion, rhinorrhea, sinus pressure, sinus pain, sore throat and trouble swallowing.    Eyes:  Negative for visual disturbance.   Respiratory:  Negative for cough, chest tightness and shortness of breath.    Cardiovascular:  Positive for leg swelling. Negative for chest pain.   Gastrointestinal:  Negative for abdominal pain, constipation, diarrhea, nausea and vomiting.   Genitourinary:  Negative for difficulty urinating and hematuria.   Musculoskeletal:  Positive for myalgias.   Skin:  Positive for wound. Negative for pallor and rash.   Neurological:  Negative for headaches.   All other systems reviewed and are negative.      Physical Exam     Initial Vitals [01/26/24 1018]   BP Pulse Resp Temp SpO2   (!) 184/91 106 18 98.5 °F (36.9 °C) 100 %      MAP       --         Physical Exam    Vitals reviewed.  Constitutional: He appears well-developed. He is not diaphoretic. No distress.   Disheveled and unkept   HENT:   Head: Normocephalic and atraumatic.   Right Ear: External ear normal.   Left Ear: External ear normal.   Nose: Nose normal.   Mouth/Throat: Oropharynx is clear and moist.   Eyes: Conjunctivae and EOM are normal. Pupils are equal, round, and reactive to light.   Neck: Neck supple. No JVD present.   Normal range of motion.  Cardiovascular:  Normal rate, regular rhythm, normal heart sounds and intact distal pulses.     Exam reveals no gallop and no friction rub.       No murmur heard.  Pulmonary/Chest: Breath sounds normal. No respiratory distress. He has no wheezes. He has no rhonchi. He has no rales. He exhibits no tenderness.   Abdominal: Abdomen is soft. Bowel sounds are normal. He exhibits no distension. There is no abdominal  tenderness. There is no rebound and no guarding.   Genitourinary:    Penis normal.     Musculoskeletal:         General: No tenderness or edema. Normal range of motion.      Cervical back: Normal range of motion and neck supple.     Lymphadenopathy:     He has no cervical adenopathy.   Neurological: He is alert and oriented to person, place, and time. He has normal strength. No cranial nerve deficit or sensory deficit. GCS score is 15. GCS eye subscore is 4. GCS verbal subscore is 5. GCS motor subscore is 6.   Skin: Skin is warm and dry. Capillary refill takes less than 2 seconds. No rash noted. There is erythema. No pallor.   Examination of the left hand reveals an old burn to the thumb.  He though does have feeling and capillary refill is normal.  Exam of his feet reveals a dime sized wound to the plantar aspect of the right foot with surrounding erythema suggestive of cellulitis to the foot.  The left foot has a wound over the area the base of the 5th metatarsal.  There is no evidence of any obvious wound infection in left foot.   Psychiatric: He has a normal mood and affect. His behavior is normal. Judgment and thought content normal.         ED Course   Procedures  Labs Reviewed   CBC W/ AUTO DIFFERENTIAL - Abnormal; Notable for the following components:       Result Value    WBC 18.18 (*)     Hemoglobin 12.7 (*)     Hematocrit 39.8 (*)     MCHC 31.9 (*)     MPV 8.6 (*)     Immature Granulocytes 0.6 (*)     Gran # (ANC) 15.0 (*)     Immature Grans (Abs) 0.11 (*)     Mono # 1.1 (*)     Gran % 82.6 (*)     Lymph % 10.0 (*)     All other components within normal limits   COMPREHENSIVE METABOLIC PANEL - Abnormal; Notable for the following components:    Sodium 127 (*)     Chloride 88 (*)     Glucose 528 (*)     Alkaline Phosphatase 139 (*)     All other components within normal limits   BETA - HYDROXYBUTYRATE, SERUM - Abnormal; Notable for the following components:    Beta-Hydroxybutyrate 2.6 (*)     All other  components within normal limits   POCT GLUCOSE - Abnormal; Notable for the following components:    POC Glucose 485 (*)     All other components within normal limits   CULTURE, BLOOD   CULTURE, BLOOD   LACTIC ACID, PLASMA   MAGNESIUM   TROPONIN I HIGH SENSITIVITY   ALCOHOL,MEDICAL (ETHANOL)   URINALYSIS, REFLEX TO URINE CULTURE   DRUG SCREEN PANEL, URINE EMERGENCY        ECG Results              EKG 12-lead (In process)  Result time 01/26/24 11:35:41      In process by Interface, Lab In [facility] (01/26/24 11:35:41)                   Narrative:    Test Reason : R07.9,    Vent. Rate : 096 BPM     Atrial Rate : 096 BPM     P-R Int : 152 ms          QRS Dur : 098 ms      QT Int : 364 ms       P-R-T Axes : 073 089 069 degrees     QTc Int : 459 ms    Normal sinus rhythm  Possible Left atrial enlargement  Borderline Abnormal ECG  When compared with ECG of 23-JAN-2024 17:48,  T wave amplitude has decreased in Anterior leads    Referred By: [email or web address]   SELF           Confirmed By:                                   Imaging Results              X-Ray Foot Complete Bilateral (Final result)  Result time 01/26/24 11:40:46   Procedure changed from X-Ray Foot Complete Left     Final result by Tristan Breen MD (01/26/24 11:40:46)                   Narrative:    CLINICAL HISTORY:  48 years (1975) Male fall Bilateral foot wounds    TECHNIQUE:  XR FOOT 3 OR MORE VIEWS BILATERAL. 6 view(s) obtained .    COMPARISON:  None available.    FINDINGS:  No acute fracture or dislocation. The joints and interspaces are maintained. There is a small os peroneum. There is mild soft tissue swelling in the precalcaneal soft tissues and possible ulceration skin on the right with no radiopaque foreign body identified.    IMPRESSION:  No acute osseous abnormality.                  .    Electronically signed by:  Tristan Breen MD  01/26/2024 11:40 AM [facility] Workstation: [id number]                                     X-Ray Hand 3 view Left  (Final result)  Result time 01/26/24 11:39:06      Final result by Tristan Breen MD (01/26/24 11:39:06)                   Narrative:    CLINICAL HISTORY:  48 years (1975) Male hand pain Left hand wounds    TECHNIQUE:  XR HAND 3 OR MORE VIEWS LEFT. 3 view(s) obtained .    COMPARISON:  None available.    FINDINGS:  No acute fracture or dislocation. The joints and interspaces are maintained. Mild focal subcutaneous soft tissue swelling and edema over the dorsal aspect of the first interphalangeal joint. No radiopaque foreign body is identified.    IMPRESSION:  No acute osseous abnormality.                  .    Electronically signed by:  Tristan Breen MD  01/26/2024 11:39 AM [facility] Workstation: [id number]                                     X-Ray Chest AP Portable (Final result)  Result time 01/26/24 11:39:55      Final result by Tristan Breen MD (01/26/24 11:39:55)                   Narrative:    CLINICAL HISTORY:  48 years (1975) Male Sepsis    TECHNIQUE:  Portable AP radiograph the chest. One view.    COMPARISON:  None available.    FINDINGS:  The lungs are clear. Costophrenic angles are seen without effusion. No pneumothorax is identified. The heart is normal in size. The mediastinum is within normal limits. Osseous structures appear within normal limits. The visualized upper abdomen is unremarkable.    IMPRESSION:  No acute cardiac or pulmonary process.                  .            Electronically signed by:  Tristan Breen MD  01/26/2024 11:39 AM [facility] Workstation: [id number]                                     Medications   piperacillin-tazobactam 3.375 g in dextrose 5 % 100 mL IVPB (ready to mix) (3.375 g Intravenous New Bag 1/26/24 1238)   vancomycin - pharmacy to dose (has no administration in time range)   vancomycin 1.25 g in dextrose 5% 250 mL IVPB (ready to mix) (has no administration in time range)   vancomycin 750 mg in dextrose 5 % 250 mL IVPB (ready to mix) (750 mg Intravenous  Trough Due As Scheduled Before Dose 1/28/24 0030)   0.9%  NaCl infusion (1,000 mLs Intravenous New Bag 1/26/24 1238)   insulin regular injection 8 Units 0.08 mL (8 Units Intravenous Given 1/26/24 1236)     Medical Decision Making  Amount and/or Complexity of Data Reviewed  Labs: ordered.  Radiology: ordered.    Risk  OTC drugs.  Prescription drug management.  Decision regarding hospitalization.              Attending Attestation:             Attending ED Notes:   ED course and MDM:  This 40-year-old male referred to emergency room from his primary physician's office for uncontrolled diabetes after having been out of his medication, was found have had initial blood sugar 528 for which she received 8 units of regular insulin IV and normal saline drip.  The patient's other labs showed a CO2 of 26 and a beta hydroxybutyrate of 2.6.  Sodium was 127 and lactate was 1.6.  White count is 18.18.  The patient had chest x-ray and x-rays of his hands and feet which were negative for any foreign body.  The patient did have evidence of a puncture wound to the plantar aspect of the right foot and an associated cellulitis.  There was no comment as to him having developed some osteomyelitis.  The patient had blood cultures obtained and was started on antibiotics including vancomycin and Zosyn.  He will require hospitalization and hospital medicine service is consulted for a med surg admission.                             Clinical Impression:  Final diagnoses:  [R07.9] Chest pain  [M79.673] Foot pain  [L03.90] Cellulitis, unspecified cellulitis site (Primary)  [E10.65] Uncontrolled type 1 diabetes mellitus with hyperglycemia  [T25.222A] Partial thickness burn of left foot, initial encounter  [M79.671] Right foot pain  [T14.8XXA] Puncture wound          ED Disposition Condition    Admit Stable                Garcia Garcia Jr., MD  01/26/24 1307       Garcia Garcia Jr., MD  01/26/24 1312

## 2024-01-26 NOTE — Clinical Note
The catheter was inserted into the left AV fistula. Balloon was inflated 8 thu at 15 sec, 10 thu 40, 8 thu at 20, 8atm at 30 sec, 14 thu at 35 sec  Balloom removed .

## 2024-01-27 LAB
ANION GAP SERPL CALC-SCNC: 5 MMOL/L (ref 8–16)
BUN SERPL-MCNC: 16 MG/DL (ref 6–20)
CALCIUM SERPL-MCNC: 8.1 MG/DL (ref 8.7–10.5)
CHLORIDE SERPL-SCNC: 98 MMOL/L (ref 95–110)
CO2 SERPL-SCNC: 29 MMOL/L (ref 23–29)
CREAT SERPL-MCNC: 0.8 MG/DL (ref 0.5–1.4)
ERYTHROCYTE [DISTWIDTH] IN BLOOD BY AUTOMATED COUNT: 13.2 % (ref 11.5–14.5)
EST. GFR  (NO RACE VARIABLE): >60 ML/MIN/1.73 M^2
GLUCOSE SERPL-MCNC: 137 MG/DL (ref 70–110)
GLUCOSE SERPL-MCNC: 221 MG/DL (ref 70–110)
GLUCOSE SERPL-MCNC: 232 MG/DL (ref 70–110)
GLUCOSE SERPL-MCNC: 376 MG/DL (ref 70–110)
GLUCOSE SERPL-MCNC: 417 MG/DL (ref 70–110)
HCT VFR BLD AUTO: 32.4 % (ref 40–54)
HGB BLD-MCNC: 10.6 G/DL (ref 14–18)
MAGNESIUM SERPL-MCNC: 1.8 MG/DL (ref 1.6–2.6)
MCH RBC QN AUTO: 28 PG (ref 27–31)
MCHC RBC AUTO-ENTMCNC: 32.7 G/DL (ref 32–36)
MCV RBC AUTO: 86 FL (ref 82–98)
PLATELET # BLD AUTO: 298 K/UL (ref 150–450)
PMV BLD AUTO: 8.7 FL (ref 9.2–12.9)
POC ACTIVATED CLOTTING TIME K: 179 SEC (ref 74–137)
POTASSIUM SERPL-SCNC: 3.7 MMOL/L (ref 3.5–5.1)
RBC # BLD AUTO: 3.79 M/UL (ref 4.6–6.2)
SAMPLE: ABNORMAL
SODIUM SERPL-SCNC: 132 MMOL/L (ref 136–145)
VIT B12 SERPL-MCNC: 513 PG/ML (ref 210–950)
WBC # BLD AUTO: 15.39 K/UL (ref 3.9–12.7)

## 2024-01-27 PROCEDURE — C1725 CATH, TRANSLUMIN NON-LASER: HCPCS | Performed by: SURGERY

## 2024-01-27 PROCEDURE — 25000003 PHARM REV CODE 250: Performed by: SURGERY

## 2024-01-27 PROCEDURE — 82607 VITAMIN B-12: CPT | Performed by: INTERNAL MEDICINE

## 2024-01-27 PROCEDURE — 83735 ASSAY OF MAGNESIUM: CPT | Performed by: INTERNAL MEDICINE

## 2024-01-27 PROCEDURE — 80048 BASIC METABOLIC PNL TOTAL CA: CPT | Performed by: INTERNAL MEDICINE

## 2024-01-27 PROCEDURE — 25000003 PHARM REV CODE 250: Performed by: EMERGENCY MEDICINE

## 2024-01-27 PROCEDURE — 63600175 PHARM REV CODE 636 W HCPCS: Performed by: EMERGENCY MEDICINE

## 2024-01-27 PROCEDURE — 99152 MOD SED SAME PHYS/QHP 5/>YRS: CPT | Performed by: SURGERY

## 2024-01-27 PROCEDURE — C1730 CATH, EP, 19 OR FEW ELECT: HCPCS | Performed by: SURGERY

## 2024-01-27 PROCEDURE — 25000003 PHARM REV CODE 250: Performed by: INTERNAL MEDICINE

## 2024-01-27 PROCEDURE — B41F1ZZ FLUOROSCOPY OF RIGHT LOWER EXTREMITY ARTERIES USING LOW OSMOLAR CONTRAST: ICD-10-PCS | Performed by: SURGERY

## 2024-01-27 PROCEDURE — C1894 INTRO/SHEATH, NON-LASER: HCPCS | Performed by: SURGERY

## 2024-01-27 PROCEDURE — C1876 STENT, NON-COA/NON-COV W/DEL: HCPCS | Performed by: SURGERY

## 2024-01-27 PROCEDURE — 37252 INTRVASC US NONCORONARY 1ST: CPT | Performed by: SURGERY

## 2024-01-27 PROCEDURE — 63600175 PHARM REV CODE 636 W HCPCS: Mod: JZ,JG | Performed by: INTERNAL MEDICINE

## 2024-01-27 PROCEDURE — 12000002 HC ACUTE/MED SURGE SEMI-PRIVATE ROOM

## 2024-01-27 PROCEDURE — 99900031 HC PATIENT EDUCATION (STAT)

## 2024-01-27 PROCEDURE — 85027 COMPLETE CBC AUTOMATED: CPT | Performed by: INTERNAL MEDICINE

## 2024-01-27 PROCEDURE — C1753 CATH, INTRAVAS ULTRASOUND: HCPCS | Performed by: SURGERY

## 2024-01-27 PROCEDURE — 37221 HC ILIAC REVASC W/STENT: CPT | Mod: 50 | Performed by: SURGERY

## 2024-01-27 PROCEDURE — C1769 GUIDE WIRE: HCPCS | Performed by: SURGERY

## 2024-01-27 PROCEDURE — 99900035 HC TECH TIME PER 15 MIN (STAT)

## 2024-01-27 PROCEDURE — 36415 COLL VENOUS BLD VENIPUNCTURE: CPT | Performed by: INTERNAL MEDICINE

## 2024-01-27 PROCEDURE — 37223 HC ILIAC REVASC W/STENT ADD-ON: CPT | Mod: RT | Performed by: SURGERY

## 2024-01-27 PROCEDURE — 97161 PT EVAL LOW COMPLEX 20 MIN: CPT

## 2024-01-27 PROCEDURE — 047D3ZZ DILATION OF LEFT COMMON ILIAC ARTERY, PERCUTANEOUS APPROACH: ICD-10-PCS | Performed by: SURGERY

## 2024-01-27 PROCEDURE — 047H3DZ DILATION OF RIGHT EXTERNAL ILIAC ARTERY WITH INTRALUMINAL DEVICE, PERCUTANEOUS APPROACH: ICD-10-PCS | Performed by: SURGERY

## 2024-01-27 PROCEDURE — B41G1ZZ FLUOROSCOPY OF LEFT LOWER EXTREMITY ARTERIES USING LOW OSMOLAR CONTRAST: ICD-10-PCS | Performed by: SURGERY

## 2024-01-27 PROCEDURE — C1887 CATHETER, GUIDING: HCPCS | Performed by: SURGERY

## 2024-01-27 PROCEDURE — 047J3DZ DILATION OF LEFT EXTERNAL ILIAC ARTERY WITH INTRALUMINAL DEVICE, PERCUTANEOUS APPROACH: ICD-10-PCS | Performed by: SURGERY

## 2024-01-27 PROCEDURE — 63600175 PHARM REV CODE 636 W HCPCS: Performed by: SURGERY

## 2024-01-27 PROCEDURE — 047C3ZZ DILATION OF RIGHT COMMON ILIAC ARTERY, PERCUTANEOUS APPROACH: ICD-10-PCS | Performed by: SURGERY

## 2024-01-27 PROCEDURE — 21000000 HC CCU ICU ROOM CHARGE

## 2024-01-27 PROCEDURE — 94761 N-INVAS EAR/PLS OXIMETRY MLT: CPT

## 2024-01-27 PROCEDURE — 37253 INTRVASC US NONCORONARY ADDL: CPT | Performed by: SURGERY

## 2024-01-27 PROCEDURE — 75716 ARTERY X-RAYS ARMS/LEGS: CPT | Mod: 59 | Performed by: SURGERY

## 2024-01-27 PROCEDURE — 99153 MOD SED SAME PHYS/QHP EA: CPT | Performed by: SURGERY

## 2024-01-27 PROCEDURE — 27201423 OPTIME MED/SURG SUP & DEVICES STERILE SUPPLY: Performed by: SURGERY

## 2024-01-27 PROCEDURE — B4101ZZ FLUOROSCOPY OF ABDOMINAL AORTA USING LOW OSMOLAR CONTRAST: ICD-10-PCS | Performed by: SURGERY

## 2024-01-27 DEVICE — STENT PRB35-08-060-080 PROTEGE EF V07
Type: IMPLANTABLE DEVICE | Site: ABDOMEN | Status: FUNCTIONAL
Brand: EVERFLEX™ PROTÉGÉ™ EVERFLEX™

## 2024-01-27 DEVICE — PXB35-08-27-080 STENT VISI PRO 035 V01
Type: IMPLANTABLE DEVICE | Site: ABDOMEN | Status: FUNCTIONAL
Brand: VISI-PRO™

## 2024-01-27 RX ORDER — MIDAZOLAM HYDROCHLORIDE 1 MG/ML
INJECTION INTRAMUSCULAR; INTRAVENOUS
Status: DISCONTINUED | OUTPATIENT
Start: 2024-01-27 | End: 2024-01-27 | Stop reason: HOSPADM

## 2024-01-27 RX ORDER — FENTANYL CITRATE 50 UG/ML
INJECTION, SOLUTION INTRAMUSCULAR; INTRAVENOUS
Status: DISCONTINUED | OUTPATIENT
Start: 2024-01-27 | End: 2024-01-27 | Stop reason: HOSPADM

## 2024-01-27 RX ORDER — HEPARIN SODIUM 10000 [USP'U]/ML
INJECTION, SOLUTION INTRAVENOUS; SUBCUTANEOUS
Status: DISCONTINUED | OUTPATIENT
Start: 2024-01-27 | End: 2024-01-27 | Stop reason: HOSPADM

## 2024-01-27 RX ORDER — HYDRALAZINE HYDROCHLORIDE 20 MG/ML
INJECTION INTRAMUSCULAR; INTRAVENOUS
Status: DISCONTINUED | OUTPATIENT
Start: 2024-01-27 | End: 2024-01-27 | Stop reason: HOSPADM

## 2024-01-27 RX ORDER — CLOPIDOGREL BISULFATE 75 MG/1
75 TABLET ORAL DAILY
Status: DISCONTINUED | OUTPATIENT
Start: 2024-01-28 | End: 2024-02-01 | Stop reason: HOSPADM

## 2024-01-27 RX ORDER — CEFAZOLIN SODIUM 1 G/3ML
INJECTION, POWDER, FOR SOLUTION INTRAMUSCULAR; INTRAVENOUS
Status: DISCONTINUED | OUTPATIENT
Start: 2024-01-27 | End: 2024-01-27 | Stop reason: HOSPADM

## 2024-01-27 RX ORDER — CLOPIDOGREL BISULFATE 75 MG/1
TABLET ORAL
Status: DISCONTINUED | OUTPATIENT
Start: 2024-01-27 | End: 2024-01-27 | Stop reason: HOSPADM

## 2024-01-27 RX ADMIN — METRONIDAZOLE 500 MG: 500 INJECTION, SOLUTION INTRAVENOUS at 11:01

## 2024-01-27 RX ADMIN — INSULIN ASPART 4 UNITS: 100 INJECTION, SOLUTION INTRAVENOUS; SUBCUTANEOUS at 08:01

## 2024-01-27 RX ADMIN — INSULIN DETEMIR 22 UNITS: 100 INJECTION, SOLUTION SUBCUTANEOUS at 09:01

## 2024-01-27 RX ADMIN — ATORVASTATIN CALCIUM 40 MG: 40 TABLET, FILM COATED ORAL at 09:01

## 2024-01-27 RX ADMIN — CEFEPIME 2 G: 2 INJECTION, POWDER, FOR SOLUTION INTRAVENOUS at 05:01

## 2024-01-27 RX ADMIN — METRONIDAZOLE 500 MG: 500 INJECTION, SOLUTION INTRAVENOUS at 06:01

## 2024-01-27 RX ADMIN — CEFEPIME 2 G: 2 INJECTION, POWDER, FOR SOLUTION INTRAVENOUS at 11:01

## 2024-01-27 RX ADMIN — INSULIN ASPART 5 UNITS: 100 INJECTION, SOLUTION INTRAVENOUS; SUBCUTANEOUS at 11:01

## 2024-01-27 RX ADMIN — VANCOMYCIN HYDROCHLORIDE 750 MG: 750 INJECTION, POWDER, LYOPHILIZED, FOR SOLUTION INTRAVENOUS at 01:01

## 2024-01-27 RX ADMIN — INSULIN ASPART 4 UNITS: 100 INJECTION, SOLUTION INTRAVENOUS; SUBCUTANEOUS at 11:01

## 2024-01-27 RX ADMIN — VANCOMYCIN HYDROCHLORIDE 750 MG: 750 INJECTION, POWDER, LYOPHILIZED, FOR SOLUTION INTRAVENOUS at 02:01

## 2024-01-27 NOTE — PROCEDURE NOTE ADDENDUM
[facility]  Department of Vascular Surgery  Post-Operative Note    SUMMARY     Date of Procedure: 1/27/2024     Procedure: Procedure(s) (LRB):  ANGIOGRAM, EXTREMITY, BILATERAL (Right)  Angiogram, Abdominal Aorta (N/A)  Intravascular Ultrasound, Non-Coronary (N/A)  Stents, Iliac, Bilateral (Bilateral)     Surgeon(s) and Role:     * Bhavesh Pelletier MD - Primary    Assisting Surgeon: None    Pre-Operative Diagnosis: Right foot pain [M79.671]    Post-Operative Diagnosis: Post-Op Diagnosis Codes:     * Right foot pain [M79.671]    Anesthesia: RN IV Sedation  The patient was brought to the special procedures room.  The patient was placed on the angiogram table.  The patient was immediately connected to a continuous pulse oximetry monitor, and continuous EKG monitor, and automatic blood pressure cuff cycled every 5 minutes.  The patient was monitored by registered nurse his only responsibility was to monitor the patient during the procedure.  The nurses ACLS certified and experienced in sedation under physician direction.  The patient was given a total dose of 3 mg Versed and 100 mcg of fentanyl.  The initial dose was 150 and further doses were given during the procedure for the total noted above.    Description of the Procedure:   1. Placement of right external iliac artery stent with angioplasty  2. Balloon angioplasty of the right common iliac artery  3. Balloon expandable stent left common iliac artery with angioplasty  4. Self expanding stent left external iliac artery with angioplasty  5. Abdominal aortogram  6. Bilateral lower extremity artery runoff angiograms  7. Intravascular ultrasound of the right external iliac artery  8. Intravascular ultrasound of the right common femoral artery     Procedure in greater detail:  The patient was placed on the angiogram table.  The anesthetic protocol was monitored.  I punctured the left common femoral artery and placed a short 5 Amharic sheath.  Through this 5  [place] citizen sheath I then advanced an abdominal aortogram catheter to the level of renal arteries and performed an abdominal aortogram I pulled the catheter down to just above the aortic bifurcation and performed the a lower abdominal aortogram including the iliac arteries.  I then selected the right iliac artery advanced the catheter into the common femoral artery.  I performed a digital angiogram of the right lower extremity.  Following this I then left a long 035 stiff wire in the right superficial femoral artery from the left side.  We did administer an anticoagulation at the beginning of the procedure.  We went ahead and then exchanged the 035 wire I am sorry we then over the free 5 wire advanced a 6 [place] citizen crossover sheath into the right iliac system.  We then exchanged for an IVC catheter.  We then went ahead and measured the size and the degree of stenosis in the right external iliac artery and the right common iliac artery.  We noted that the common femoral artery distal to the stenosis was about 7 mm in diameter.  We then ballooned the right external iliac artery with a 7 mm by 80 mm angioplasty balloon inflated to full effacement.  When we deflated the balloon and removed it we noted that the patient still had significant residual stenosis.  We therefore deployed an 8 mm x 80 mm balloon expandable stent and question go back we then deployed an 8 mm x 80 mm self expanding stent.  We post dilated with an 8 mm x 80 mm balloon inflated to full effacement.  We then pulled the sheath back across the bifurcation dilated the common iliac right common iliac artery with the 8 mm balloon.  We noted some mild residual stenosis and did not think a stent was required.  Once we had treated finish treating the right side we then pulled the sheath into the to the proximal left common iliac artery and performed angiography.  We here we confirmed a 90% stenosis of the mid left common iliac artery which we had seen on the  abdominal aortogram and an 80% stenosis of the proximal left external iliac artery.  We went ahead then and placed a balloon expandable stent 8 mm x 37 mm across centered on the focal stenosis of the left common iliac artery.  We then ballooned the left external iliac artery with a 7 mm x 80 mm balloon inflated to full effacement.  However there was still significant residual stenosis we then deployed an 8 mm x 60 mm self expanding stent in the left external iliac artery and post dilated with the 8 mm balloon.  Following this we exchanged for a short sheath we then went ahead and did a runoff arteriogram of the left leg.  It appeared that there was a stenosis in the proximal superficial femoral artery we were not sure whether acute or chronic but he did reconstitute the mid and distal superficial femoral with good runoff in the lower extremity.  We then pulled the sheath and held pressure on the puncture site once ACT came down to an acceptable level.      Complications: No    Estimated Blood Loss (EBL): * No values recorded between 1/27/2024  2:56 PM and 1/27/2024  4:38 PM *           Implants:   Implant Name Type Inv. Item Serial No.  Lot No. LRB No. Used Action   STENT EVERFLEX [id number] - [id number] stent peripheral bms - self exp STENT EVERFLEX [id number]  [employer] [id number] N/A 1 Implanted   STENT VISI-PRO [id number] - [id number] stent peripheral BMS- Balloon Exp STENT VISI-PRO [id number]  [employer] [id number] Left 1 Implanted       Specimens:   Specimen (24h ago, onward)      None                    Condition: Good    Disposition: PACU - hemodynamically stable.      Bhavesh Pelletier MD  01/27/2024  4:38 [facility]  Department of Vascular Surgery  Post-Operative Note    SUMMARY     Date of Procedure: 1/27/2024     Procedure: Procedure(s) (LRB):  ANGIOGRAM, EXTREMITY, BILATERAL (Right)  Angiogram, Abdominal Aorta (N/A)  Intravascular Ultrasound, Non-Coronary (N/A)  Stents, Iliac, Bilateral  (Bilateral)     Surgeon(s) and Role:     * Bhavesh Pelletier MD - Primary    Assisting Surgeon: None    Pre-Operative Diagnosis: Right foot pain [M79.671]    Post-Operative Diagnosis: Post-Op Diagnosis Codes:     * Right foot pain [M79.671]    Anesthesia: RN IV Sedation    Description of the Procedure:  See detailed description noted above    Complications: No    Estimated Blood Loss (EBL): * No values recorded between 1/27/2024  2:56 PM and 1/27/2024  4:38 PM *           Implants:   Implant Name Type Inv. Item Serial No.  Lot No. LRB No. Used Action   STENT EVERFLEX [id number] - [id number] stent peripheral bms - self exp STENT EVERFLEX [id number]  [employer] [id number] N/A 1 Implanted   STENT VISI-PRO [id number] - [id number] stent peripheral BMS- Balloon Exp STENT VISI-PRO [id number]  [employer] [id number] Left 1 Implanted       Specimens:   Specimen (24h ago, onward)      None                    Condition: Good    Disposition:  Step-down unit      Bhavesh Pelletier MD  01/27/2024  4:38 PM
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
## 2024-01-27 NOTE — PROGRESS NOTES
Awaiting MRI to determine if surgical intervention will be needed- would be done early next week if needed.    Recommend local wound care to consist of aquacel ag followed by border foam to foot wounds for now.

## 2024-01-27 NOTE — PT/OT/SLP EVAL
Physical Therapy Evaluation and Discharge Note    Patient Name:  Sergio Porter   MRN:  [id number]    Recommendations:     Discharge Recommendations: No Therapy Indicated  Discharge Equipment Recommendations: none   Barriers to discharge: None    Assessment:     Sergio Porter is a 48 y.o. male admitted with a medical diagnosis of Cellulitis. Nurse and pt report he is ambulating in hallway and outdoors to smoke without assistance PRN.  At this time, patient is functioning at their prior level of function and does not require further acute PT services.     Recent Surgery: Procedure(s) (LRB):  ANGIOGRAM, AV SHUNT, WITH PERCUTANEOUS MECHANICAL THROMBECTOMY, ANGIOPLASTY, AND STENT INSERTION (Left) Day of Surgery    Plan:     During this hospitalization, patient does not require further acute PT services.  Please re-consult if situation changes.      Subjective     Chief Complaint: mild pain R foot  Patient/Family Comments/goals: home  Pain/Comfort:  Pain Rating 1: 3/10  Location - Side 1: Right  Location 1: heel  Pain Rating Post-Intervention 1: 3/10    Patients cultural, spiritual, Hinduism conflicts given the current situation: no    Living Environment:  Lives with spouse , son  in [facility], [address] w rail, drives   Prior to admission, patients level of function was independent.  Equipment used at home: glucometer.  DME owned (not currently used): single point cane.  Upon discharge, patient will have assistance from family.    Objective:     Communicated with nurseNguyen prior to session.  Patient found supine with peripheral IV upon PT entry to room.    General Precautions: Standard, diabetic    Orthopedic Precautions:N/A (pt off loading heel)   Braces: N/A  Respiratory Status: Room air    Exams:  Cognitive Exam:  Patient is oriented to Person, Place, Time, and Situation  Gross Motor Coordination:  WFL  RUE Strength: WNL  LUE Strength: WNL  RLE Strength: WNL  LLE Strength: WNL    Functional Mobility:  Bed Mobility:      Supine to Sit: independence  Sit to Supine: independence  Transfers:     Sit to Stand:  independence with no AD  Gait: ambulating on unit without AD independently off loading heel.     AM-PAC 6 CLICK MOBILITY  Total Score:24       Treatment and Education:  Pt educated in PT roles and goals, off loading R heel, fall prevention    AM-[facility] 6 CLICK MOBILITY  Total Score:24     Patient left supine with all lines intact and call button in reach.    GOALS:   Multidisciplinary Problems       Physical Therapy Goals       Not on file                    History:     Past Medical History:   Diagnosis Date    COVID-19     around june 2022, per patient/wife    Diabetes mellitus, type 2     Dyslipidemia     Opioid abuse        Past Surgical History:   Procedure Laterality Date    INCISION AND DRAINAGE OF ABSCESS N/A 9/16/2022    Procedure: INCISION AND DRAINAGE, ABSCESS- BACK;  Surgeon: Glenn Fraser MD;  Location: [facility] OR;  Service: General;  Laterality: N/A;       Time Tracking:     PT Received On: 01/27/24  PT Start Time: 1122     PT Stop Time: 1130  PT Total Time (min): 8 min     Billable Minutes: Evaluation 8      01/27/2024

## 2024-01-27 NOTE — NURSING
Pt received from cath lab, pt awake alert orientedx4. Denies pain at this time.  Left groin dressing clean dry and intact, no hematoma noted. Doppler pedal pulses bilaterally.  Pt verballized understanding of bedrest.  Call light within reach, VSS.  Monitoring pt closely.  Applied O2 2L/NC, o2 sat 96%..

## 2024-01-27 NOTE — RESPIRATORY THERAPY
01/26/24 2039   Patient Assessment/Suction   Level of Consciousness (AVPU) alert   Respiratory Effort Normal;Unlabored   Expansion/Accessory Muscles/Retractions no use of accessory muscles;no retractions   All Lung Fields Breath Sounds Anterior:;clear   Rhythm/Pattern, Respiratory unlabored   Cough Frequency no cough   PRE-TX-O2   Device (Oxygen Therapy) room air   SpO2 97 %   Pulse Oximetry Type Intermittent   $ Pulse Oximetry - Single Charge Pulse Oximetry - Single   $ Pulse Oximetry - Multiple Charge Pulse Oximetry - Multiple   Pulse 97   Positioning HOB elevated 30 degrees
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
## 2024-01-27 NOTE — PT/OT/SLP PROGRESS
Occupational Therapy      Patient Name:  Sergio Porter   MRN:  [id number]    Patient not seen today secondary to  (Pt ANIA in Cath lab for procedure.). Will follow-up 1/28/2024.    1/27/2024

## 2024-01-27 NOTE — PROGRESS NOTES
CTA abd/p w/ runnoff: Diffusely narrowed bowel aorta and iliac vessels. There is focal high-grade stenosis, near occlusion of the distal left common iliac artery. Focal distal high-grade stenosis is present within the distal right external iliac artery. There is moderate high-grade stenosis within the left external iliac artery proximally and distally. Both superficial femoral arteries are patent but are diffusely narrowed. There is three-vessel runoff into both feet via small caliber anterior and posterior tibial arteries and peroneal arteries.    Will start on ASA/lipitor, await vascular surgery input

## 2024-01-27 NOTE — CONSULTS
"[facility]  Adult Nutrition   Consult Note (Initial Assessment)     SUMMARY     Recommendations  Recommendation/Intervention: 1. Diabetic 1800 kcal diet. 2. Glucerna with meals and Marco Antonio BID to assist with healing. 3. Recommend updated Hb A1c lab value (last value Febuary 2023).  Goals: 1. Intake to be > / = 50% EEN / EPN. 2. Lab values trend to target range.    Nutrition Diagnosis PES Statement: Altered nutrition related lab values related to endocrine dysfunction as evidenced by uncontrolled DM 2 with glucose labs > 180 mg/dL.; last Hb A1c last year.     Dietitian Rounds Brief  Consult for weight loss. Patient off unit most of day; unable to interview. Pt reports significant weight loss; none noted per Epic. Patient with abnormal glucose labs, and foot wound; needs education. NFPE not performed as patient off unit x 2 attempts to visit. RD to follow as appropriate. Will add Glucerna with meals and Marco Antonio BID for healing assist.      Diet order:   Current Diet Order: Diabetic         Evaluation of Received Nutrient/Fluid Intake  Energy Calories Required: not meeting needs  Protein Required: not meeting needs  Fluid Required: not meeting needs  Tolerance: tolerating     % Intake of Estimated Energy Needs: 0 - 25 %  % Meal Intake: 0 - 25 %      Intake/Output Summary (Last 24 hours) at 1/27/2024 1602  Last data filed at 1/27/2024 0957  Gross per 24 hour   Intake 250 ml   Output --   Net 250 ml        Anthropometrics  Temp: 96.8 °F (36 °C)  Height Method: Stated  Height: 5' 7" (170.2 cm)  Height (inches): 67 in  Weight Method: Bed Scale  Weight: 53.1 kg (117 lb 1 oz)  Weight (lb): 117.07 lb  Ideal Body Weight (IBW), Male: 148 lb  % Ideal Body Weight, Male (lb): 79.1 %  BMI (Calculated): 18.3  BMI Grade: 17 - 18.4 protein-energy malnutrition grade I       Estimated/Assessed Needs  Weight Used For Calorie Calculations: 53.1 kg (117 lb 1 oz)  Energy Calorie Requirements (kcal): 8752-3249 / day (30-35 " kcal/kg)  Energy Need Method: Kcal/kg  Protein Requirements: 63-80 gm/day (1.2-1.5 gm/kg)  Weight Used For Protein Calculations: 53.1 kg (117 lb 1 oz)     Estimated Fluid Requirement Method: RDA Method  RDA Method (mL): 1593  CHO Requirement: 232 kcal, 15 servings per day    Reason for Assessment  Reason For Assessment: consult  Relevant Medical History: DM 2 with A1c > 10  Interdisciplinary Rounds: did not attend  Nutrition Discharge Planning: Pending    Nutrition/Diet History  Spiritual, Cultural Beliefs, Yarsanism Practices, Values that Affect Care: no  Food Allergies: NKFA  Factors Affecting Nutritional Intake: None identified at this time    Nutrition Risk Screen  Nutrition Risk Screen: no indicators present       Altered Skin Integrity 01/26/24 1313 Right Heel-Wound Image: Images linked       Altered Skin Integrity 01/26/24 1313 Left Finger, third-Wound Image: Images linked       Altered Skin Integrity 01/26/24 1313 Left Finger, first-Wound Image: Images linked       Altered Skin Integrity 01/26/24 1313 Right anterior Foot-Wound Image: Images linked       Altered Skin Integrity 01/26/24 1313 Left anterior Foot-Wound Image: Images linked       Altered Skin Integrity 01/26/24 1313 Left posterior Foot-Wound Image: Images linked       Altered Skin Integrity 01/26/24 1313 Left anterior Knee-Wound Image: Images linked  MST Score: 0  Have you recently lost weight without trying?: No  Weight loss score: 0  Have you been eating poorly because of a decreased appetite?: No  Appetite score: 0       Weight History:  Wt Readings from Last 5 Encounters:   01/27/24 53.1 kg (117 lb 1 oz)   01/26/24 51.7 kg (114 lb)   01/23/24 49.9 kg (110 lb)   09/07/23 52.2 kg (115 lb)   05/15/23 51.9 kg (114 lb 6.4 oz)        Lab/Procedures/Meds: Pertinent Labs/Meds Reviewed    Medications:Pertinent Medications Reviewed  Scheduled Meds:   atorvastatin  40 mg Oral QHS    ceFEPime IV (PEDS and ADULTS)  2 g Intravenous Q8H    enoxparin  40 mg  "Subcutaneous Daily    insulin detemir U-100  22 Units Subcutaneous Daily    metronidazole  500 mg Intravenous Q8H    multivitamin  1 tablet Oral Daily    vancomycin (VANCOCIN) IV (PEDS and ADULTS)  750 mg Intravenous Q12H     Continuous Infusions:   sodium chloride 0.9% 100 mL/hr at 01/26/24 1422     PRN Meds:.acetaminophen, acetaminophen, dextrose 50%, dextrose 50%, dextrose 50%, dextrose 50%, fentaNYL, glucagon (human recombinant), glucagon (human recombinant), glucose, glucose, glucose, glucose, heparin (porcine), HYDROcodone-acetaminophen, insulin aspart U-100, melatonin, midazolam, naloxone, ondansetron, potassium bicarbonate, potassium bicarbonate, potassium bicarbonate, sodium chloride 0.9%, Pharmacy to dose Vancomycin consult **AND** vancomycin - pharmacy to dose    Labs: Pertinent Labs Reviewed  Clinical Chemistry:  Recent Labs   Lab 01/23/24  1719 01/23/24  2221 01/26/24  1032 01/27/24  0517   *   < > 127* 132*   K 4.3   < > 4.7 3.7   CL 87*   < > 88* 98   CO2 29   < > 26 29   *   < > 528* 137*   BUN 17   < > 17 16   CREATININE 0.76   < > 0.8 0.8   CALCIUM 8.9   < > 9.4 8.1*   PROT 7.5  --  7.7  --    ALBUMIN 4.1  --  3.6  --    BILITOT 0.5  --  0.5  --    ALKPHOS 170*  --  139*  --    AST 24  --  17  --    ALT 21  --  12  --    ANIONGAP 10   < > 13 5*   MG  --    < > 2.0 1.8    < > = values in this interval not displayed.     CBC:   Recent Labs   Lab 01/27/24  0517   WBC 15.39*   RBC 3.79*   HGB 10.6*   HCT 32.4*      MCV 86   MCH 28.0   MCHC 32.7     Lipid Panel:  No results for input(s): "CHOL", "HDL", "LDLCALC", "TRIG", "CHOLHDL" in the last 168 hours.  Cardiac Profile:  No results for input(s): "BNP", "CPK", "CPKMB", "TROPONINI", "CKTOTAL" in the last 168 hours.  Inflammatory Labs:  No results for input(s): "CRP" in the last 168 hours.  Diabetes:  Recent Labs   Lab 01/23/24  1718 01/23/24  2208   POCTGLUCOSE 501* 251*     Thyroid & Parathyroid:  No results for input(s): "TSH", " ""FREET4", "A7ACZGD", "I8FHTPS", "THYROIDAB" in the last 168 hours.    Monitor and Evaluation  Food and Nutrient Intake: energy intake, food and beverage intake  Food and Nutrient Adminstration: diet order  Knowledge/Beliefs/Attitudes: food and nutrition knowledge/skill  Physical Activity and Function: nutrition-related ADLs and IADLs  Anthropometric Measurements: weight change, weight, body mass index  Biochemical Data, Medical Tests and Procedures: electrolyte and renal panel, gastrointestinal profile, glucose/endocrine profile, inflammatory profile, lipid profile  Nutrition-Focused Physical Findings: extremities, muscles and bones     Nutrition Risk  Level of Risk/Frequency of Follow-up:  (1 x/ week)     Nutrition Follow-Up  RD Follow-up?: Yes      Merry Jacob RD, LDN 01/27/2024 3:53 PM     "

## 2024-01-27 NOTE — PLAN OF CARE
Problem: Oral Intake Inadequate  Goal: Improved Oral Intake  Intervention: Promote and Optimize Oral Intake  Flowsheets (Taken 1/27/2024 1604)  Oral Nutrition Promotion: calorie-dense liquids provided. Marco Antonio BID and Glucerna with meals for healing assistance.

## 2024-01-27 NOTE — PLAN OF CARE
[facility]  Initial Discharge Assessment       Primary Care Provider: Aletha Henson FNP-C    Admission Diagnosis: Cellulitis, unspecified cellulitis site [L03.90]    Admission Date: 1/26/2024  Expected Discharge Date:      Assessment was completed at bedside with Pt. Pt was alert and oriented. Cm verified demographic, insurance and pharmacy. Pt denies any in home services, DME's, dialysis or coumadin clinics. Pt reports he is independent with all ADL's. Pt is a full code.  Pt states his wife is his POC. Pt plans to discharge home. At this time Pt has no discharge needs.       Payor: MEDICAID / Plan: MEDICAID OF [place] / Product Type: Government /     Extended Emergency Contact Information  Primary Emergency Contact: kj herrmann  Address: [id number] [place] [address] [id number] [place] [address]  Home Phone: [phone]  Mobile Phone: [phone]  Relation: Spouse  Preferred language: English   needed? No    Discharge Plan A: Home with family  Discharge Plan B: Home with family      [facility] - [place], [place] - [address] [facility]  [address] [place] [address]  Phone: [phone] Fax: [phone]    [facility] [address] [place], [place] [address]  [address] [facility] [address]  Phone: [phone] Fax: [phone]    [facility]. [facility] [address] [facility]  [address] [facility] [address]  Phone: [phone] Fax: [phone]      Initial Assessment (most recent)       Adult Discharge Assessment - 01/27/24 1615          Discharge Assessment    Assessment Type Discharge Planning Assessment     Confirmed/corrected address, phone number and insurance Yes     Confirmed Demographics Correct on Facesheet     Source of Information patient     When was your last doctors appointment? 01/26/24     Reason For Admission Cellulitis     People in Home spouse;child(lorraine), dependent     Do  you expect to return to your current living situation? Yes     Do you have help at home or someone to help you manage your care at home? Yes     Who are your caregiver(s) and their phone number(s)? wife Bobbi Ingram [phone]     Prior to hospitilization cognitive status: Alert/Oriented     Current cognitive status: Alert/Oriented     Walking or Climbing Stairs Difficulty no     Dressing/Bathing Difficulty no     Home Accessibility wheelchair accessible     Home Layout Able to live on 1st floor     Equipment Currently Used at Home cane, straight     Patient currently being followed by outpatient case management? No     Do you currently have service(s) that help you manage your care at home? No     Do you take prescription medications? Yes     Do you have prescription coverage? Yes     Do you have any problems affording any of your prescribed medications? No     Is the patient taking medications as prescribed? yes     Who is going to help you get home at discharge? Bobbi Ingram [phone]     How do you get to doctors appointments? car, drives self     Are you on dialysis? No     Do you take coumadin? No     Discharge Plan A Home with family     Discharge Plan B Home with family     DME Needed Upon Discharge  none     Discharge Plan discussed with: Patient

## 2024-01-28 LAB
ANION GAP SERPL CALC-SCNC: 8 MMOL/L (ref 8–16)
BUN SERPL-MCNC: 14 MG/DL (ref 6–20)
CALCIUM SERPL-MCNC: 8.1 MG/DL (ref 8.7–10.5)
CHLORIDE SERPL-SCNC: 95 MMOL/L (ref 95–110)
CO2 SERPL-SCNC: 25 MMOL/L (ref 23–29)
CREAT SERPL-MCNC: 0.7 MG/DL (ref 0.5–1.4)
ERYTHROCYTE [DISTWIDTH] IN BLOOD BY AUTOMATED COUNT: 13.3 % (ref 11.5–14.5)
EST. GFR  (NO RACE VARIABLE): >60 ML/MIN/1.73 M^2
GLUCOSE SERPL-MCNC: 189 MG/DL (ref 70–110)
GLUCOSE SERPL-MCNC: 350 MG/DL (ref 70–110)
GLUCOSE SERPL-MCNC: 461 MG/DL (ref 70–110)
GLUCOSE SERPL-MCNC: 552 MG/DL (ref 70–110)
HCT VFR BLD AUTO: 35 % (ref 40–54)
HGB BLD-MCNC: 11.6 G/DL (ref 14–18)
MAGNESIUM SERPL-MCNC: 1.9 MG/DL (ref 1.6–2.6)
MCH RBC QN AUTO: 28.2 PG (ref 27–31)
MCHC RBC AUTO-ENTMCNC: 33.1 G/DL (ref 32–36)
MCV RBC AUTO: 85 FL (ref 82–98)
PLATELET # BLD AUTO: 319 K/UL (ref 150–450)
PMV BLD AUTO: 8.4 FL (ref 9.2–12.9)
POTASSIUM SERPL-SCNC: 4.4 MMOL/L (ref 3.5–5.1)
RBC # BLD AUTO: 4.12 M/UL (ref 4.6–6.2)
SODIUM SERPL-SCNC: 128 MMOL/L (ref 136–145)
VANCOMYCIN TROUGH SERPL-MCNC: 4.4 UG/ML
VANCOMYCIN TROUGH SERPL-MCNC: 5.7 UG/ML
WBC # BLD AUTO: 14 K/UL (ref 3.9–12.7)

## 2024-01-28 PROCEDURE — 63600175 PHARM REV CODE 636 W HCPCS: Performed by: STUDENT IN AN ORGANIZED HEALTH CARE EDUCATION/TRAINING PROGRAM

## 2024-01-28 PROCEDURE — 83036 HEMOGLOBIN GLYCOSYLATED A1C: CPT | Mod: 91 | Performed by: INTERNAL MEDICINE

## 2024-01-28 PROCEDURE — 12000002 HC ACUTE/MED SURGE SEMI-PRIVATE ROOM

## 2024-01-28 PROCEDURE — 80202 ASSAY OF VANCOMYCIN: CPT | Mod: 91 | Performed by: HOSPITALIST

## 2024-01-28 PROCEDURE — 25000003 PHARM REV CODE 250: Performed by: INTERNAL MEDICINE

## 2024-01-28 PROCEDURE — 36415 COLL VENOUS BLD VENIPUNCTURE: CPT | Performed by: EMERGENCY MEDICINE

## 2024-01-28 PROCEDURE — 94761 N-INVAS EAR/PLS OXIMETRY MLT: CPT

## 2024-01-28 PROCEDURE — 25000003 PHARM REV CODE 250: Performed by: SURGERY

## 2024-01-28 PROCEDURE — 63600175 PHARM REV CODE 636 W HCPCS: Performed by: INTERNAL MEDICINE

## 2024-01-28 PROCEDURE — 80202 ASSAY OF VANCOMYCIN: CPT | Performed by: EMERGENCY MEDICINE

## 2024-01-28 PROCEDURE — A9585 GADOBUTROL INJECTION: HCPCS | Performed by: HOSPITALIST

## 2024-01-28 PROCEDURE — 80048 BASIC METABOLIC PNL TOTAL CA: CPT | Performed by: INTERNAL MEDICINE

## 2024-01-28 PROCEDURE — 85027 COMPLETE CBC AUTOMATED: CPT | Performed by: INTERNAL MEDICINE

## 2024-01-28 PROCEDURE — 97165 OT EVAL LOW COMPLEX 30 MIN: CPT

## 2024-01-28 PROCEDURE — 36415 COLL VENOUS BLD VENIPUNCTURE: CPT | Performed by: HOSPITALIST

## 2024-01-28 PROCEDURE — 83735 ASSAY OF MAGNESIUM: CPT | Performed by: INTERNAL MEDICINE

## 2024-01-28 PROCEDURE — 25500020 PHARM REV CODE 255: Performed by: HOSPITALIST

## 2024-01-28 PROCEDURE — 63600175 PHARM REV CODE 636 W HCPCS: Performed by: HOSPITALIST

## 2024-01-28 PROCEDURE — 25000003 PHARM REV CODE 250: Performed by: HOSPITALIST

## 2024-01-28 PROCEDURE — 83036 HEMOGLOBIN GLYCOSYLATED A1C: CPT | Performed by: HOSPITALIST

## 2024-01-28 PROCEDURE — 36415 COLL VENOUS BLD VENIPUNCTURE: CPT | Performed by: INTERNAL MEDICINE

## 2024-01-28 RX ORDER — INSULIN ASPART 100 [IU]/ML
10 INJECTION, SOLUTION INTRAVENOUS; SUBCUTANEOUS ONCE
Status: COMPLETED | OUTPATIENT
Start: 2024-01-28 | End: 2024-01-28

## 2024-01-28 RX ORDER — GADOBUTROL 604.72 MG/ML
5 INJECTION INTRAVENOUS
Status: COMPLETED | OUTPATIENT
Start: 2024-01-28 | End: 2024-01-28

## 2024-01-28 RX ORDER — VANCOMYCIN HCL IN 5 % DEXTROSE 1G/250ML
1000 PLASTIC BAG, INJECTION (ML) INTRAVENOUS
Status: DISCONTINUED | OUTPATIENT
Start: 2024-01-28 | End: 2024-01-29

## 2024-01-28 RX ORDER — INSULIN ASPART 100 [IU]/ML
5 INJECTION, SOLUTION INTRAVENOUS; SUBCUTANEOUS
Status: DISCONTINUED | OUTPATIENT
Start: 2024-01-28 | End: 2024-01-29

## 2024-01-28 RX ADMIN — VANCOMYCIN HYDROCHLORIDE 750 MG: 750 INJECTION, POWDER, LYOPHILIZED, FOR SOLUTION INTRAVENOUS at 03:01

## 2024-01-28 RX ADMIN — INSULIN ASPART 4 UNITS: 100 INJECTION, SOLUTION INTRAVENOUS; SUBCUTANEOUS at 09:01

## 2024-01-28 RX ADMIN — INSULIN ASPART 10 UNITS: 100 INJECTION, SOLUTION INTRAVENOUS; SUBCUTANEOUS at 06:01

## 2024-01-28 RX ADMIN — VANCOMYCIN HYDROCHLORIDE 1000 MG: 1 INJECTION, POWDER, LYOPHILIZED, FOR SOLUTION INTRAVENOUS at 03:01

## 2024-01-28 RX ADMIN — CLOPIDOGREL BISULFATE 75 MG: 75 TABLET, FILM COATED ORAL at 09:01

## 2024-01-28 RX ADMIN — METRONIDAZOLE 500 MG: 500 INJECTION, SOLUTION INTRAVENOUS at 09:01

## 2024-01-28 RX ADMIN — INSULIN DETEMIR 10 UNITS: 100 INJECTION, SOLUTION SUBCUTANEOUS at 09:01

## 2024-01-28 RX ADMIN — METRONIDAZOLE 500 MG: 500 INJECTION, SOLUTION INTRAVENOUS at 05:01

## 2024-01-28 RX ADMIN — ATORVASTATIN CALCIUM 40 MG: 40 TABLET, FILM COATED ORAL at 09:01

## 2024-01-28 RX ADMIN — MULTIVITAMIN TABLET 1 TABLET: TABLET at 09:01

## 2024-01-28 RX ADMIN — GADOBUTROL 5 ML: 604.72 INJECTION INTRAVENOUS at 05:01

## 2024-01-28 RX ADMIN — METRONIDAZOLE 500 MG: 500 INJECTION, SOLUTION INTRAVENOUS at 01:01

## 2024-01-28 RX ADMIN — CEFEPIME 2 G: 2 INJECTION, POWDER, FOR SOLUTION INTRAVENOUS at 01:01

## 2024-01-28 RX ADMIN — CEFEPIME 2 G: 2 INJECTION, POWDER, FOR SOLUTION INTRAVENOUS at 09:01

## 2024-01-28 RX ADMIN — INSULIN ASPART 2 UNITS: 100 INJECTION, SOLUTION INTRAVENOUS; SUBCUTANEOUS at 01:01

## 2024-01-28 RX ADMIN — CEFEPIME 2 G: 2 INJECTION, POWDER, FOR SOLUTION INTRAVENOUS at 06:01

## 2024-01-28 RX ADMIN — INSULIN ASPART 10 UNITS: 100 INJECTION, SOLUTION INTRAVENOUS; SUBCUTANEOUS at 09:01

## 2024-01-28 NOTE — PROGRESS NOTES
Pharmacokinetic Assessment Follow Up: IV Vancomycin    Patient brief summary:  Sergio Porter is a 48 y.o. male initiated on antimicrobial therapy with IV Vancomycin for treatment of Skin & Soft Tissue infection    The patient's current regimen is Vancomycin 750 mg every 12 hours.       Actual Body Weight = 53.1 kg (117 lb 1 oz).    Renal Function:   Estimated Creatinine Clearance: 84.8 mL/min (based on SCr of 0.8 mg/dL).      Vancomycin serum concentration assessment(s):    The measurement is below the desired definitive target range of 10 to 15 mcg/mL.    Per RN, third dose was paused and have not yet restarted before trough level drawn.     Vancomycin Regimen Plan:    Continue Vancomycin 750 mg IV every 12 hours for 1 more dose. Next serum trough concentration measured at 14:00 prior to 5th dose on 1/28.    Drug levels (last 3 results):  Recent Labs   Lab Result Units 01/28/24  0028   Vancomycin-Trough ug/mL 4.4*       Pharmacy will continue to follow and monitor vancomycin.    Please contact pharmacy at extension [phone] for questions regarding this assessment.    Thank you for the consult,   Valencia Lynn

## 2024-01-28 NOTE — PROGRESS NOTES
Pharmacokinetic Assessment Follow Up: IV Vancomycin    Vancomycin serum concentration assessment(s):    The trough level was drawn correctly and can be used to guide therapy at this time. The measurement is below the desired definitive target range of 10 to 15 mcg/mL.    Vancomycin Regimen Plan:    Change regimen to Vancomycin 1000 mg IV every 12 hours with next serum trough concentration measured at 1445 prior to 7th dose on 1/29/24.    Drug levels (last 3 results):  Recent Labs   Lab Result Units 01/28/24  0028 01/28/24  1401   Vancomycin-Trough ug/mL 4.4* 5.7*       Pharmacy will continue to follow and monitor vancomycin.    Please contact pharmacy at extension [phone] for questions regarding this assessment.    Thank you for the consult,   Kevin Francisco       Patient brief summary:  Sergio Porter is a 48 y.o. male initiated on antimicrobial therapy with IV Vancomycin for treatment of skin & soft tissue infection    The patient's current regimen is Vancomycin 1000 mg Q12H.    Drug Allergies:   Review of patient's allergies indicates:  No Known Allergies    Actual Body Weight:   53.1 kg    Renal Function:   Estimated Creatinine Clearance: 96.9 mL/min (based on SCr of 0.7 mg/dL).,     Dialysis Method (if applicable):  N/A    CBC (last 72 hours):  Recent Labs   Lab Result Units 01/26/24  1032 01/27/24  0517 01/28/24  0459   WBC K/uL 18.18* 15.39* 14.00*   Hemoglobin g/dL 12.7* 10.6* 11.6*   Hematocrit % 39.8* 32.4* 35.0*   Platelets K/uL 316 298 319   Gran % % 82.6*  --   --    Lymph % % 10.0*  --   --    Mono % % 5.9  --   --    Eosinophil % % 0.6  --   --    Basophil % % 0.3  --   --    Differential Method  Automated  --   --        Metabolic Panel (last 72 hours):  Recent Labs   Lab Result Units 01/26/24  1032 01/26/24  1302 01/27/24  0517 01/28/24  0459   Sodium mmol/L 127*  --  132* 128*   Potassium mmol/L 4.7  --  3.7 4.4   Chloride mmol/L 88*  --  98 95   CO2 mmol/L 26  --  29 25   Glucose mg/dL 528*  --  137*  552*   Glucose, UA   --  4+*  --   --    BUN mg/dL 17  --  16 14   Creatinine mg/dL 0.8  --  0.8 0.7   Creatinine, Urine mg/dL  --  13.5*  --   --    Albumin g/dL 3.6  --   --   --    Total Bilirubin mg/dL 0.5  --   --   --    Alkaline Phosphatase U/L 139*  --   --   --    AST U/L 17  --   --   --    ALT U/L 12  --   --   --    Magnesium mg/dL 2.0  --  1.8 1.9       Vancomycin Administrations:  vancomycin given in the last 96 hours                     vancomycin 750 mg in dextrose 5 % 250 mL IVPB (ready to mix) (mg) 750 mg New Bag 01/28/24 0325    vancomycin 750 mg in dextrose 5 % 250 mL IVPB (ready to mix) (mg) 750 mg New Bag 01/27/24 1408     750 mg New Bag  0158    vancomycin 1.25 g in dextrose 5% 250 mL IVPB (ready to mix) (mg) 1,250 mg New Bag 01/26/24 1503                    Microbiologic Results:  Microbiology Results (last 7 days)       Procedure Component Value Units Date/Time    Blood culture x two cultures. Draw prior to antibiotics. [[id number]] Collected: 01/26/24 1041    Order Status: Completed Specimen: Blood Updated: 01/28/24 1232     Blood Culture, Routine No Growth to date      No Growth to date      No Growth to date    Narrative:      Aerobic and anaerobic    Blood culture x two cultures. Draw prior to antibiotics. [[id number]] Collected: 01/26/24 1049    Order Status: Completed Specimen: Blood from Antecubital, Left Updated: 01/28/24 1232     Blood Culture, Routine No Growth to date      No Growth to date      No Growth to date    Narrative:      Aerobic and anaerobic

## 2024-01-28 NOTE — CARE UPDATE
RT Evaluation of @atitle@ Sergio Porter.      Per RT Protocol, will discontinue the Pulse Ox - Patient has maintained or exceeded SaO2 according to parameters..     Should RT services/therapy become required, services will be restarted.      Miriam Allison, RRT

## 2024-01-28 NOTE — PROCEDURE NOTE ADDENDUM
[facility]  Department of Vascular Surgery  Post-Operative Note    SUMMARY     Date of Procedure: 1/27/2024     Procedure: Procedure(s) (LRB):  ANGIOGRAM, EXTREMITY, BILATERAL (Right)  Angiogram, Abdominal Aorta (N/A)  Intravascular Ultrasound, Non-Coronary (N/A)  Stents, Iliac, Bilateral (Bilateral)     Surgeon(s) and Role:     * Bhavesh Pelletier MD - Primary    Assisting Surgeon: None    Pre-Operative Diagnosis: Right foot pain [M79.671]    Post-Operative Diagnosis: Post-Op Diagnosis Codes:     * Right foot pain [M79.671]    Anesthesia: RN IV Sedation  The patient was brought to the special procedures room.  The patient was placed on the angiogram table.  The patient was immediately connected to a continuous pulse oximetry monitor, and continuous EKG monitor, and automatic blood pressure cuff cycled every 5 minutes.  The patient was monitored by registered nurse his only responsibility was to monitor the patient during the procedure.  The nurses ACLS certified and experienced in sedation under physician direction.  The patient was given a total dose of 3 mg Versed and 100 mcg of fentanyl.  The initial dose was 150 and further doses were given during the procedure for the total noted above.    Description of the Procedure:   1. Placement of right external iliac artery stent with angioplasty  2. Balloon angioplasty of the right common iliac artery  3. Balloon expandable stent left common iliac artery with angioplasty  4. Self expanding stent left external iliac artery with angioplasty  5. Abdominal aortogram  6. Bilateral lower extremity artery runoff angiograms  7. Intravascular ultrasound of the right external iliac artery  8. Intravascular ultrasound of the right common femoral artery     Procedure in greater detail:  The patient was placed on the angiogram table.  The anesthetic protocol was monitored.  I punctured the left common femoral artery and placed a short 5 Tajik sheath.  Through this 5  Nicaraguan sheath I then advanced an abdominal aortogram catheter to the level of renal arteries and performed an abdominal aortogram I pulled the catheter down to just above the aortic bifurcation and performed the a lower abdominal aortogram including the iliac arteries.  I then selected the right iliac artery advanced the catheter into the common femoral artery.  I performed a digital angiogram of the right lower extremity.  Following this I then left a long 035 stiff wire in the right superficial femoral artery from the left side.  We did administer an anticoagulation at the beginning of the procedure.  We went ahead and then exchanged the 035 wire I am sorry we then over the free 5 wire advanced a 6 Nicaraguan crossover sheath into the right iliac system.  We then exchanged for an IVC catheter.  We then went ahead and measured the size and the degree of stenosis in the right external iliac artery and the right common iliac artery.  We noted that the common femoral artery distal to the stenosis was about 7 mm in diameter.  We then ballooned the right external iliac artery with a 7 mm by 80 mm angioplasty balloon inflated to full effacement.  When we deflated the balloon and removed it we noted that the patient still had significant residual stenosis.  We therefore deployed an 8 mm x 80 mm balloon expandable stent and question go back we then deployed an 8 mm x 80 mm self expanding stent.  We post dilated with an 8 mm x 80 mm balloon inflated to full effacement.  We then pulled the sheath back across the bifurcation dilated the common iliac right common iliac artery with the 8 mm balloon.  We noted some mild residual stenosis and did not think a stent was required.  Once we had treated finish treating the right side we then pulled the sheath into the to the proximal left common iliac artery and performed angiography.  We here we confirmed a 90% stenosis of the mid left common iliac artery which we had seen on the  abdominal aortogram and an 80% stenosis of the proximal left external iliac artery.  We went ahead then and placed a balloon expandable stent 8 mm x 37 mm across centered on the focal stenosis of the left common iliac artery.  We then ballooned the left external iliac artery with a 7 mm x 80 mm balloon inflated to full effacement.  However there was still significant residual stenosis we then deployed an 8 mm x 60 mm self expanding stent in the left external iliac artery and post dilated with the 8 mm balloon.  Following this we exchanged for a short sheath we then went ahead and did a runoff arteriogram of the left leg.  It appeared that there was a stenosis in the proximal superficial femoral artery we were not sure whether acute or chronic but he did reconstitute the mid and distal superficial femoral with good runoff in the lower extremity.  We then pulled the sheath and held pressure on the puncture site once ACT came down to an acceptable level.      Complications: No    Estimated Blood Loss (EBL): * No values recorded between 1/27/2024  2:56 PM and 1/27/2024  4:38 PM *           Implants:   Implant Name Type Inv. Item Serial No.  Lot No. LRB No. Used Action   STENT EVERFLEX [id number] - [id number] stent peripheral bms - self exp STENT EVERFLEX [id number]  [employer] [id number] N/A 1 Implanted   STENT VISI-PRO [id number] - [id number] stent peripheral BMS- Balloon Exp STENT VISI-PRO [id number]  [employer] [id number] Left 1 Implanted       Specimens:   Specimen (24h ago, onward)      None                    Condition: Good    Disposition: PACU - hemodynamically stable.      Bhavesh Pelletier MD  01/27/2024  4:38 [facility]  Department of Vascular Surgery  Post-Operative Note    SUMMARY     Date of Procedure: 1/27/2024     Procedure: Procedure(s) (LRB):  ANGIOGRAM, EXTREMITY, BILATERAL (Right)  Angiogram, Abdominal Aorta (N/A)  Intravascular Ultrasound, Non-Coronary (N/A)  Stents, Iliac, Bilateral  (Bilateral)     Surgeon(s) and Role:     * Bhavesh Pelletier MD - Primary    Assisting Surgeon: None    Pre-Operative Diagnosis: Right foot pain [M79.671]    Post-Operative Diagnosis: Post-Op Diagnosis Codes:     * Right foot pain [M79.671]    Anesthesia: RN IV Sedation    Description of the Procedure:  See detailed description noted above    Complications: No    Estimated Blood Loss (EBL): * No values recorded between 1/27/2024  2:56 PM and 1/27/2024  4:38 PM *           Implants:   Implant Name Type Inv. Item Serial No.  Lot No. LRB No. Used Action   STENT EVERFLEX [id number] - [id number] stent peripheral bms - self exp STENT EVERFLEX [id number]  [employer] [place] [id number] N/A 1 Implanted   STENT VISI-PRO [id number] - [id number] stent peripheral BMS- Balloon Exp STENT VISI-PRO [id number]  [employer] [id number] Left 1 Implanted       Specimens:   Specimen (24h ago, onward)      None                    Condition: Good    Disposition:  Step-down unit      Bhavesh Pelletier MD  01/27/2024  4:38 PM

## 2024-01-28 NOTE — PROGRESS NOTES
"[facility] Medicine  Progress Note    Patient Name: Sergio Porter  MRN: [id number]  Patient Class: IP- Inpatient   Admission Date: 1/26/2024  Length of Stay: 2 days  Attending Physician: Karuna Scott MD  Primary Care Provider: Aletha Henson FNP-C        Subjective:     Principal Problem:Cellulitis        HPI:  No notes on file    Overview/Hospital Course:  No notes on file    Interval History:  No complaints.  No pain in his foot.  No fever.    Review of Systems Complete ROS otherwise negative other than stated in HPI.   Objective:     Vital Signs (Most Recent):  Temp: 98 °F (36.7 °C) (01/28/24 0807)  Pulse: (!) 111 (01/28/24 0807)  Resp: 15 (01/28/24 0807)  BP: 130/80 (01/28/24 0807)  SpO2: 97 % (01/28/24 0807) Vital Signs (24h Range):  Temp:  [96.8 °F (36 °C)-100 °F (37.8 °C)] 98 °F (36.7 °C)  Pulse:  [] 111  Resp:  [13-19] 15  SpO2:  [92 %-98 %] 97 %  BP: (107-170)/(59-96) 130/80     Weight: 53.1 kg (117 lb 1 oz)  Body mass index is 18.33 kg/m².    Intake/Output Summary (Last 24 hours) at 1/28/2024 1129  Last data filed at 1/28/2024 1031  Gross per 24 hour   Intake 480 ml   Output --   Net 480 ml         Physical Exam  GENERAL:  Alert and oriented x 3.  Thin.  No distress  HEENT:  EOMI. Conjunctivae intact.   NECK:  Supple   LUNGS:  No respiratory distress.   ABDOMEN:  Soft,  Nontender and nondistended  EXTREMITIES:  No clubbing or cyanosis or edema        Significant Labs: All pertinent labs within the past 24 hours have been reviewed.  Bilirubin:   Recent Labs   Lab 01/23/24  1719 01/26/24  1032   BILITOT 0.5 0.5     Blood Culture: No results for input(s): "LABBLOO" in the last 48 hours.  BMP:   Recent Labs   Lab 01/28/24  0459   *   *   K 4.4   CL 95   CO2 25   BUN 14   CREATININE 0.7   CALCIUM 8.1*   MG 1.9     CBC:   Recent Labs   Lab 01/27/24  0517 01/28/24  0459   WBC 15.39* 14.00*   HGB 10.6* 11.6*   HCT 32.4* 35.0*    319     CMP:   Recent Labs "   Lab 01/27/24  0517 01/28/24  0459   * 128*   K 3.7 4.4   CL 98 95   CO2 29 25   * 552*   BUN 16 14   CREATININE 0.8 0.7   CALCIUM 8.1* 8.1*   ANIONGAP 5* 8       Significant Imaging: I have reviewed all pertinent imaging results/findings within the past 24 hours.    Assessment/Plan:      Right foot puncture wound with cellulitis:  In the setting of uncontrolled diabetes.  Had angiogram with revascularization yesterday.  Plan for MRI by Podiatry.  Continue antibiotics with cefepime, Flagyl, vancomycin.  Continue wound care and  Follow cultures which are negative so far,  will deescalate antibiotics tomorrow     Tobacco use: Does not wish to quit.  Refuses nicotine patch     Diabetes: Noncompliant with medications, poorly controlled.  A1c 13, repeat A1c pending.  Increase long-acting insulin to 30 units today.  Add 5 units preprandial insulin.  Coverage with sliding scale insulin, increase to moderate intensity    VTE Risk Mitigation (From admission, onward)           Ordered     enoxaparin injection 40 mg  Daily         01/26/24 1313     IP VTE HIGH RISK PATIENT  Once         01/26/24 1313     Place sequential compression device  Until discontinued         01/26/24 1313                    Discharge Planning   MADDIE:      Code Status: Full Code   Is the patient medically ready for discharge?:     Reason for patient still in hospital (select all that apply): Treatment  Discharge Plan A: Home with family                  Karuna Scott MD  Department of Hospital Medicine   [facility]

## 2024-01-28 NOTE — PROGRESS NOTES
Critical access hospital  General Surgery  Progress Note    Subjective:     Interval History: The patient is s/p percutaneous interventions on the iliac arteries via the left groin approach. The patient reports that his leg feels better. He denies any pain in the left groin.     Post-Op Info:  Procedure(s) (LRB):  ANGIOGRAM, EXTREMITY, BILATERAL (Right)  Angiogram, Abdominal Aorta (N/A)  Intravascular Ultrasound, Non-Coronary (N/A)  Stents, Iliac, Bilateral (Bilateral)   1 Day Post-Op      Medications:  Continuous Infusions:  Scheduled Meds:   atorvastatin  40 mg Oral QHS    ceFEPime IV (PEDS and ADULTS)  2 g Intravenous Q8H    clopidogreL  75 mg Oral Daily    enoxparin  40 mg Subcutaneous Daily    insulin aspart U-100  5 Units Subcutaneous TIDWM    [START ON 1/29/2024] insulin detemir U-100  30 Units Subcutaneous Daily    metronidazole  500 mg Intravenous Q8H    multivitamin  1 tablet Oral Daily    vancomycin (VANCOCIN) IV (PEDS and ADULTS)  750 mg Intravenous Q12H     PRN Meds:acetaminophen, acetaminophen, dextrose 50%, dextrose 50%, glucagon (human recombinant), glucose, glucose, HYDROcodone-acetaminophen, insulin aspart U-100, melatonin, naloxone, ondansetron, potassium bicarbonate, potassium bicarbonate, potassium bicarbonate, sodium chloride 0.9%, Pharmacy to dose Vancomycin consult **AND** vancomycin - pharmacy to dose     Objective:     Vital Signs (Most Recent):  Temp: 98.5 °F (36.9 °C) (01/28/24 1126)  Pulse: 101 (01/28/24 1126)  Resp: 18 (01/28/24 1126)  BP: (!) 159/78 (01/28/24 1126)  SpO2: 96 % (01/28/24 1126) Vital Signs (24h Range):  Temp:  [97.2 °F (36.2 °C)-100 °F (37.8 °C)] 98.5 °F (36.9 °C)  Pulse:  [] 101  Resp:  [13-19] 18  SpO2:  [92 %-98 %] 96 %  BP: (107-159)/(59-96) 159/78       Intake/Output Summary (Last 24 hours) at 1/28/2024 1330  Last data filed at 1/28/2024 1031  Gross per 24 hour   Intake 480 ml   Output --   Net 480 ml       Physical Exam  Constitutional:       General: He is  not in acute distress.  HENT:      Head: Normocephalic and atraumatic.   Eyes:      Extraocular Movements: Extraocular movements intact.      Pupils: Pupils are equal, round, and reactive to light.   Cardiovascular:      Rate and Rhythm: Normal rate and regular rhythm.      Comments: Bounding right DP pulse. Warm feet. No groin hematoma  Pulmonary:      Effort: Pulmonary effort is normal.      Breath sounds: Normal breath sounds.   Abdominal:      General: Abdomen is flat.      Palpations: Abdomen is soft.      Tenderness: There is no abdominal tenderness.   Skin:     General: Skin is warm.   Neurological:      General: No focal deficit present.      Mental Status: He is alert and oriented to person, place, and time.   Psychiatric:         Mood and Affect: Mood normal.         Behavior: Behavior normal.         Significant Labs:  BMP:   Recent Labs   Lab 01/28/24  0459   *   *   K 4.4   CL 95   CO2 25   BUN 14   CREATININE 0.7   CALCIUM 8.1*   MG 1.9       Significant Diagnostics:      Assessment/Plan:     Active Diagnoses:    Diagnosis Date Noted POA    PRINCIPAL PROBLEM:  Cellulitis [L03.90] 01/26/2024 Yes      Problems Resolved During this Admission:     Assessment:   Much improved circulation to the right foot. Podiatry can proceed from the arterial standpoint with surgery on the right foot  Some residual left superficial femoral disease noted on ultrasound and confirmed on angiography that may need to be addressed at a later date. IF podiatry is anticipating surgical procedures on the left foot, than we can schedule arterial interventions this week      Bhavesh Pelletier MD  General Surgery  [facility]

## 2024-01-28 NOTE — NURSING
"Patient AAOx4. No complaints of pain or discomfort. VSS on RA. Parents at bedside. Critical blood glucose of 552. Dr. MEHRAN Lara informed, no new orders given. Patient with repeated request for coffee and snacks throughout shift. Patient education provided on the benefits of quitting smoking and the eating a low carb diabetic diet. Patient showed no signs of learning. Patient stated "I can't smoke while Im here, so I have to eat snacks". Asked patient if I could get rid of his "fritos" patient refused. Offered patient a nicotine patch, patient stated that they cause him to have bad dreams. Informed patient his blood sugar was 552. Patient stated "my blood sugar gets way higher than that" All safety measures in place. Will continue to monitor.   "

## 2024-01-28 NOTE — PT/OT/SLP EVAL
Occupational Therapy   Evaluation and Discharge Note    Name: Sergio Porter  MRN: [id number]  Admitting Diagnosis: Cellulitis  Recent Surgery: Procedure(s) (LRB):  ANGIOGRAM, EXTREMITY, BILATERAL (Right)  Angiogram, Abdominal Aorta (N/A)  Intravascular Ultrasound, Non-Coronary (N/A)  Stents, Iliac, Bilateral (Bilateral) 1 Day Post-Op  The primary encounter diagnosis was Cellulitis, unspecified cellulitis site. Diagnoses of Chest pain, Foot pain, Uncontrolled type 1 diabetes mellitus with hyperglycemia, Partial thickness burn of left foot, initial encounter, Right foot pain, and Puncture wound were also pertinent to this visit.    Recommendations:     Discharge Recommendations: No Therapy Indicated  Discharge Equipment Recommendations: none  Barriers to discharge:  None    Assessment:     Sergio Porter is a 48 y.o. male with a medical diagnosis of Cellulitis. At this time, patient is functioning at their prior level of function and does not require further acute OT services.     Plan:     During this hospitalization, patient does not require further acute OT services.  Please re-consult if situation changes.    Plan of Care Reviewed with: patient    Subjective     Chief Complaint: why does my finger bend like this -pt referring to trigger finger on R ring finger.  Patient/Family Comments/goals: pt agreeable.    Occupational Profile:  Living Environment: pt lives with spouse in trailer with 3-4 steps with LHR; t/s combo.  Previous level of function: Independent ADLS and ambulation, drives, does not work.  Roles and Routines: active , spouse  Equipment Used at home: cane, straight  Assistance upon Discharge: spouse    Pain/Comfort:  Pain Rating 1: 0/10  Pain Rating Post-Intervention 1: 0/10    Patients cultural, spiritual, Oriental orthodox conflicts given the current situation: no    Objective:     Communicated with: nurse prior to session.  Patient found sitting edge of bed with   upon OT entry to room.    General  Precautions: Standard, diabetic  Orthopedic Precautions: LUE non weight bearing  Braces: N/A  Respiratory Status: Room air     Occupational Performance:    Functional Mobility/Transfers:  Patient completed Sit <> Stand Transfer with independence  with  no assistive device   Patient completed Bed <> Chair Transfer using Step Transfer technique with independence with no assistive device  Functional Mobility: ambulated Independent tin room no LOB    Activities of Daily Living:  Feeding:  independence .  Grooming: independence simulated-pt declined use, already completed-no issues.  Upper Body Dressing: independence -same as above  Lower Body Dressing: independence -same as above  Toileting: independence -same as above    Cognitive/Visual Perceptual:  Cognitive/Psychosocial Skills:     -       Oriented to: Person, Place, Time, and Situation   -       Follows Commands/attention:Follows multistep  commands  -       Communication: clear/fluent  -       Memory: No Deficits noted  -       Safety awareness/insight to disability: intact   -       Mood/Affect/Coping skills/emotional control: Appropriate to situation  Visual/Perceptual:      -Intact .    Physical Exam:  Balance:    -       sitting: good   standing: good  Postural examination/scapula alignment:    -       No postural abnormalities identified  Skin integrity: Open scar L dorsum IP joint thumb, callus hands on dorsum and volar  Edema:  Mild B hands/fingers  Sensation:    -       Intact  Dominant hand:    -       right  Upper Extremity Range of Motion:     -       Right Upper Extremity: WFL-trigger finger R 4th digit-no pain reported.  -       Left Upper Extremity: WFL  Upper Extremity Strength:    -       Right Upper Extremity: WFL  -       Left Upper Extremity: WFL   Strength:    -       Right Upper Extremity: WFL  -       Left Upper Extremity: WFL  Fine Motor Coordination:    -       Intact    AMPAC 6 Click ADL:  AMPAC Total Score: 24    Treatment &  Education:  Purpose of OT and POC  Educated pt in use of extension finger splint for trigger finger. He reported having a splint at home. OT fabricated splint using tongue depressor stick and secured with medical tape until pt able to get his splint from home. Finger splint fits well, precautions explained. Pt verbalized understanding.  Dc OT no needs. Pt in agreement.     Patient left sitting edge of bed with all lines intact and call button in reach    GOALS:   Multidisciplinary Problems       Occupational Therapy Goals       Not on file                    History:     Past Medical History:   Diagnosis Date    COVID-19     around june 2022, per patient/wife    Diabetes mellitus, type 2     Dyslipidemia     Opioid abuse          Past Surgical History:   Procedure Laterality Date    INCISION AND DRAINAGE OF ABSCESS N/A 9/16/2022    Procedure: INCISION AND DRAINAGE, ABSCESS- BACK;  Surgeon: Glenn Fraser MD;  Location: [facility] OR;  Service: General;  Laterality: N/A;       Time Tracking:     OT Date of Treatment: 01/28/24  OT Start Time: 1222  OT Stop Time: 1232  OT Total Time (min): 10 min    Billable Minutes:Evaluation 10  Total Time 10    1/28/2024

## 2024-01-28 NOTE — PROGRESS NOTES
MRI order for right hind foot was placed on 01/26. Still not yet completed. Awaiting MRI prior to determining surgical intervention necessary.

## 2024-01-28 NOTE — NURSING
Report given to RN on 1200 unit. Pt returning to original room 1219. Pt stable. VSS, NADN. Denies pain/needs at this time. Dressing to L groin, CDI.

## 2024-01-28 NOTE — CARE UPDATE
01/28/24 1435   Patient Assessment/Suction   Level of Consciousness (AVPU) alert   Respiratory Effort Normal;Unlabored   Expansion/Accessory Muscles/Retractions no retractions;no use of accessory muscles   All Lung Fields Breath Sounds Anterior:;Posterior:;Lateral:;equal bilaterally;clear   Rhythm/Pattern, Respiratory depth regular;pattern regular;unlabored   Cough Frequency no cough   PRE-TX-O2   Device (Oxygen Therapy) room air   SpO2 98 %   Pulse Oximetry Type Intermittent   $ Pulse Oximetry - Multiple Charge Pulse Oximetry - Multiple   Pulse 101   Resp 18

## 2024-01-28 NOTE — SUBJECTIVE & OBJECTIVE
"Interval History:  No complaints.  No pain in his foot.  No fever.    Review of Systems Complete ROS otherwise negative other than stated in HPI.   Objective:     Vital Signs (Most Recent):  Temp: 98 °F (36.7 °C) (01/28/24 0807)  Pulse: (!) 111 (01/28/24 0807)  Resp: 15 (01/28/24 0807)  BP: 130/80 (01/28/24 0807)  SpO2: 97 % (01/28/24 0807) Vital Signs (24h Range):  Temp:  [96.8 °F (36 °C)-100 °F (37.8 °C)] 98 °F (36.7 °C)  Pulse:  [] 111  Resp:  [13-19] 15  SpO2:  [92 %-98 %] 97 %  BP: (107-170)/(59-96) 130/80     Weight: 53.1 kg (117 lb 1 oz)  Body mass index is 18.33 kg/m².    Intake/Output Summary (Last 24 hours) at 1/28/2024 1129  Last data filed at 1/28/2024 1031  Gross per 24 hour   Intake 480 ml   Output --   Net 480 ml         Physical Exam  GENERAL:  Alert and oriented x 3.  Thin.  No distress  HEENT:  EOMI. Conjunctivae intact.   NECK:  Supple   LUNGS:  No respiratory distress.   ABDOMEN:  Soft,  Nontender and nondistended  EXTREMITIES:  No clubbing or cyanosis or edema        Significant Labs: All pertinent labs within the past 24 hours have been reviewed.  Bilirubin:   Recent Labs   Lab 01/23/24  1719 01/26/24  1032   BILITOT 0.5 0.5     Blood Culture: No results for input(s): "LABBLOO" in the last 48 hours.  BMP:   Recent Labs   Lab 01/28/24  0459   *   *   K 4.4   CL 95   CO2 25   BUN 14   CREATININE 0.7   CALCIUM 8.1*   MG 1.9     CBC:   Recent Labs   Lab 01/27/24  0517 01/28/24  0459   WBC 15.39* 14.00*   HGB 10.6* 11.6*   HCT 32.4* 35.0*    319     CMP:   Recent Labs   Lab 01/27/24  0517 01/28/24  0459   * 128*   K 3.7 4.4   CL 98 95   CO2 29 25   * 552*   BUN 16 14   CREATININE 0.8 0.7   CALCIUM 8.1* 8.1*   ANIONGAP 5* 8       Significant Imaging: I have reviewed all pertinent imaging results/findings within the past 24 hours.  "

## 2024-01-29 PROBLEM — L02.611 ABSCESS OF RIGHT FOOT: Status: ACTIVE | Noted: 2024-01-29

## 2024-01-29 PROBLEM — T14.8XXA PUNCTURE WOUND: Status: ACTIVE | Noted: 2024-01-29

## 2024-01-29 PROBLEM — E10.65 UNCONTROLLED TYPE 1 DIABETES MELLITUS WITH HYPERGLYCEMIA: Status: ACTIVE | Noted: 2024-01-29

## 2024-01-29 PROBLEM — L97.513 ULCER OF RIGHT FOOT WITH NECROSIS OF MUSCLE: Status: ACTIVE | Noted: 2024-01-29

## 2024-01-29 LAB
ANION GAP SERPL CALC-SCNC: 7 MMOL/L (ref 8–16)
BUN SERPL-MCNC: 17 MG/DL (ref 6–20)
CALCIUM SERPL-MCNC: 8 MG/DL (ref 8.7–10.5)
CHLORIDE SERPL-SCNC: 98 MMOL/L (ref 95–110)
CO2 SERPL-SCNC: 27 MMOL/L (ref 23–29)
CREAT SERPL-MCNC: 0.7 MG/DL (ref 0.5–1.4)
ERYTHROCYTE [DISTWIDTH] IN BLOOD BY AUTOMATED COUNT: 13.3 % (ref 11.5–14.5)
EST. GFR  (NO RACE VARIABLE): >60 ML/MIN/1.73 M^2
ESTIMATED AVG GLUCOSE: 384 MG/DL (ref 68–131)
GLUCOSE SERPL-MCNC: 165 MG/DL (ref 70–110)
GLUCOSE SERPL-MCNC: 180 MG/DL (ref 70–110)
GLUCOSE SERPL-MCNC: 195 MG/DL (ref 70–110)
GLUCOSE SERPL-MCNC: 418 MG/DL (ref 70–110)
GLUCOSE SERPL-MCNC: 463 MG/DL (ref 70–110)
HBA1C MFR BLD: >15 % (ref 4.5–6.2)
HCT VFR BLD AUTO: 34 % (ref 40–54)
HGB BLD-MCNC: 11 G/DL (ref 14–18)
MAGNESIUM SERPL-MCNC: 2 MG/DL (ref 1.6–2.6)
MCH RBC QN AUTO: 27.7 PG (ref 27–31)
MCHC RBC AUTO-ENTMCNC: 32.4 G/DL (ref 32–36)
MCV RBC AUTO: 86 FL (ref 82–98)
PLATELET # BLD AUTO: 308 K/UL (ref 150–450)
PMV BLD AUTO: 8.4 FL (ref 9.2–12.9)
POTASSIUM SERPL-SCNC: 4.4 MMOL/L (ref 3.5–5.1)
RBC # BLD AUTO: 3.97 M/UL (ref 4.6–6.2)
SODIUM SERPL-SCNC: 132 MMOL/L (ref 136–145)
VANCOMYCIN TROUGH SERPL-MCNC: 47.9 UG/ML
WBC # BLD AUTO: 15.94 K/UL (ref 3.9–12.7)

## 2024-01-29 PROCEDURE — 80202 ASSAY OF VANCOMYCIN: CPT | Performed by: HOSPITALIST

## 2024-01-29 PROCEDURE — 99222 1ST HOSP IP/OBS MODERATE 55: CPT | Mod: 25,,, | Performed by: PODIATRIST

## 2024-01-29 PROCEDURE — 83735 ASSAY OF MAGNESIUM: CPT | Performed by: INTERNAL MEDICINE

## 2024-01-29 PROCEDURE — 63600175 PHARM REV CODE 636 W HCPCS: Performed by: HOSPITALIST

## 2024-01-29 PROCEDURE — 36415 COLL VENOUS BLD VENIPUNCTURE: CPT | Performed by: HOSPITALIST

## 2024-01-29 PROCEDURE — 12000002 HC ACUTE/MED SURGE SEMI-PRIVATE ROOM

## 2024-01-29 PROCEDURE — 25000003 PHARM REV CODE 250: Performed by: SURGERY

## 2024-01-29 PROCEDURE — 25000003 PHARM REV CODE 250: Performed by: PODIATRIST

## 2024-01-29 PROCEDURE — 25000003 PHARM REV CODE 250: Performed by: INTERNAL MEDICINE

## 2024-01-29 PROCEDURE — 85027 COMPLETE CBC AUTOMATED: CPT | Performed by: INTERNAL MEDICINE

## 2024-01-29 PROCEDURE — 0J9Q0ZZ DRAINAGE OF RIGHT FOOT SUBCUTANEOUS TISSUE AND FASCIA, OPEN APPROACH: ICD-10-PCS | Performed by: PODIATRIST

## 2024-01-29 PROCEDURE — 87075 CULTR BACTERIA EXCEPT BLOOD: CPT | Performed by: PODIATRIST

## 2024-01-29 PROCEDURE — 36415 COLL VENOUS BLD VENIPUNCTURE: CPT | Performed by: INTERNAL MEDICINE

## 2024-01-29 PROCEDURE — 28003 TREATMENT OF FOOT INFECTION: CPT | Mod: ,,, | Performed by: PODIATRIST

## 2024-01-29 PROCEDURE — 80048 BASIC METABOLIC PNL TOTAL CA: CPT | Performed by: INTERNAL MEDICINE

## 2024-01-29 PROCEDURE — 25000003 PHARM REV CODE 250: Performed by: HOSPITALIST

## 2024-01-29 PROCEDURE — 87070 CULTURE OTHR SPECIMN AEROBIC: CPT | Performed by: PODIATRIST

## 2024-01-29 PROCEDURE — 97605 NEG PRS WND THER DME<=50SQCM: CPT

## 2024-01-29 PROCEDURE — 63600175 PHARM REV CODE 636 W HCPCS: Performed by: INTERNAL MEDICINE

## 2024-01-29 PROCEDURE — 87147 CULTURE TYPE IMMUNOLOGIC: CPT | Performed by: PODIATRIST

## 2024-01-29 RX ORDER — INSULIN ASPART 100 [IU]/ML
8 INJECTION, SOLUTION INTRAVENOUS; SUBCUTANEOUS
Status: DISCONTINUED | OUTPATIENT
Start: 2024-01-29 | End: 2024-01-30

## 2024-01-29 RX ADMIN — INSULIN ASPART 5 UNITS: 100 INJECTION, SOLUTION INTRAVENOUS; SUBCUTANEOUS at 07:01

## 2024-01-29 RX ADMIN — INSULIN DETEMIR 30 UNITS: 100 INJECTION, SOLUTION SUBCUTANEOUS at 07:01

## 2024-01-29 RX ADMIN — ENOXAPARIN SODIUM 40 MG: 40 INJECTION SUBCUTANEOUS at 05:01

## 2024-01-29 RX ADMIN — VANCOMYCIN HYDROCHLORIDE 1000 MG: 1 INJECTION, POWDER, LYOPHILIZED, FOR SOLUTION INTRAVENOUS at 03:01

## 2024-01-29 RX ADMIN — METRONIDAZOLE 500 MG: 500 INJECTION, SOLUTION INTRAVENOUS at 05:01

## 2024-01-29 RX ADMIN — CLOPIDOGREL BISULFATE 75 MG: 75 TABLET, FILM COATED ORAL at 10:01

## 2024-01-29 RX ADMIN — INSULIN ASPART 10 UNITS: 100 INJECTION, SOLUTION INTRAVENOUS; SUBCUTANEOUS at 07:01

## 2024-01-29 RX ADMIN — INSULIN DETEMIR 30 UNITS: 100 INJECTION, SOLUTION SUBCUTANEOUS at 09:01

## 2024-01-29 RX ADMIN — CEFEPIME 2 G: 2 INJECTION, POWDER, FOR SOLUTION INTRAVENOUS at 09:01

## 2024-01-29 RX ADMIN — COLLAGENASE SANTYL: 250 OINTMENT TOPICAL at 02:01

## 2024-01-29 RX ADMIN — INSULIN DETEMIR 10 UNITS: 100 INJECTION, SOLUTION SUBCUTANEOUS at 09:01

## 2024-01-29 RX ADMIN — MULTIVITAMIN TABLET 1 TABLET: TABLET at 10:01

## 2024-01-29 RX ADMIN — CEFEPIME 2 G: 2 INJECTION, POWDER, FOR SOLUTION INTRAVENOUS at 05:01

## 2024-01-29 RX ADMIN — INSULIN ASPART 8 UNITS: 100 INJECTION, SOLUTION INTRAVENOUS; SUBCUTANEOUS at 05:01

## 2024-01-29 RX ADMIN — METRONIDAZOLE 500 MG: 500 INJECTION, SOLUTION INTRAVENOUS at 02:01

## 2024-01-29 RX ADMIN — INSULIN ASPART 8 UNITS: 100 INJECTION, SOLUTION INTRAVENOUS; SUBCUTANEOUS at 12:01

## 2024-01-29 RX ADMIN — VANCOMYCIN HYDROCHLORIDE 1000 MG: 1 INJECTION, POWDER, LYOPHILIZED, FOR SOLUTION INTRAVENOUS at 02:01

## 2024-01-29 RX ADMIN — CEFEPIME 2 G: 2 INJECTION, POWDER, FOR SOLUTION INTRAVENOUS at 02:01

## 2024-01-29 RX ADMIN — ATORVASTATIN CALCIUM 40 MG: 40 TABLET, FILM COATED ORAL at 09:01

## 2024-01-29 RX ADMIN — INSULIN ASPART 2 UNITS: 100 INJECTION, SOLUTION INTRAVENOUS; SUBCUTANEOUS at 12:01

## 2024-01-29 RX ADMIN — METRONIDAZOLE 500 MG: 500 INJECTION, SOLUTION INTRAVENOUS at 09:01

## 2024-01-29 NOTE — SUBJECTIVE & OBJECTIVE
Scheduled Meds:   atorvastatin  40 mg Oral QHS    ceFEPime IV (PEDS and ADULTS)  2 g Intravenous Q8H    clopidogreL  75 mg Oral Daily    collagenase   Topical (Top) Twice Weekly    enoxparin  40 mg Subcutaneous Daily    insulin aspart U-100  8 Units Subcutaneous TIDWM    insulin detemir U-100  10 Units Subcutaneous QHS    insulin detemir U-100  30 Units Subcutaneous Daily    metronidazole  500 mg Intravenous Q8H    multivitamin  1 tablet Oral Daily    vancomycin (VANCOCIN) IV (PEDS and ADULTS)  1,000 mg Intravenous Q12H     Continuous Infusions:  PRN Meds:acetaminophen, acetaminophen, dextrose 50%, dextrose 50%, glucagon (human recombinant), glucose, glucose, HYDROcodone-acetaminophen, insulin aspart U-100, melatonin, naloxone, ondansetron, potassium bicarbonate, potassium bicarbonate, potassium bicarbonate, sodium chloride 0.9%, Pharmacy to dose Vancomycin consult **AND** vancomycin - pharmacy to dose    Review of patient's allergies indicates:  No Known Allergies     Past Medical History:   Diagnosis Date    COVID-19     around june 2022, per patient/wife    Diabetes mellitus, type 2     Dyslipidemia     Opioid abuse      Past Surgical History:   Procedure Laterality Date    ANGIOGRAM, ABDOMINAL AORTA N/A 1/27/2024    Procedure: Angiogram, Abdominal Aorta;  Surgeon: Bhavesh Pelletier MD;  Location: [facility] CATH/EP LAB;  Service: General;  Laterality: N/A;    ANGIOGRAM, EXTREMITY, BILATERAL Right 1/27/2024    Procedure: ANGIOGRAM, EXTREMITY, BILATERAL;  Surgeon: Bhavesh Pelletier MD;  Location: [facility] CATH/EP LAB;  Service: General;  Laterality: Right;    INCISION AND DRAINAGE OF ABSCESS N/A 9/16/2022    Procedure: INCISION AND DRAINAGE, ABSCESS- BACK;  Surgeon: Glenn Fraser MD;  Location: [facility] OR;  Service: General;  Laterality: N/A;    INTRAVASCULAR ULTRASOUND, NON-CORONARY N/A 1/27/2024    Procedure: Intravascular Ultrasound, Non-Coronary;  Surgeon: Bhavesh Pelletier MD;  Location: [facility] CATH/EP LAB;  Service:  General;  Laterality: N/A;    STENTS, ILIAC, BILATERAL Bilateral 1/27/2024    Procedure: Stents, Iliac, Bilateral;  Surgeon: Bhavesh Pelletier MD;  [facility]: [facility] CATH[facility]EP LAB;  Service: General;  Laterality: Bilateral;       Family History       Problem Relation (Age of Onset)    Diabetes Mother, Father    Heart disease Father    Hypertension Father          Tobacco Use    Smoking status: Every Day     Current packs/day: 1.00     Average packs/day: 1 pack/day for 28.0 years (28.0 ttl pk-yrs)     Types: Cigarettes    Smokeless tobacco: Never   Substance and Sexual Activity    Alcohol use: Not Currently    Drug use: Not Currently     Types: Marijuana, Other-see comments     Comment: pain medications/opioids    Sexual activity: Not Currently     Review of Systems  Objective:     Vital Signs (Most Recent):  Temp: 98.9 °F (37.2 °C) (01/29/24 1225)  Pulse: 103 (01/29/24 1225)  Resp: 18 (01/29/24 1225)  BP: (!) 141/66 (01/29/24 1225)  SpO2: 95 % (01/29/24 1225) Vital Signs (24h Range):  Temp:  [97.8 °F (36.6 °C)-99.4 °F (37.4 °C)] 98.9 °F (37.2 °C)  Pulse:  [] 103  Resp:  [16-18] 18  SpO2:  [93 %-97 %] 95 %  BP: (141-168)/(66-88) 141/66     Weight: 53.1 kg (117 lb 1 oz)  Body mass index is 18.33 kg/m².    Foot Exam    Right Foot/Ankle     Neurovascular  Dorsalis pedis: 1+  Posterior tibial: 1+  Saphenous nerve sensation: absent  Tibial nerve sensation: absent  Superficial peroneal nerve sensation: absent  Deep peroneal nerve sensation: absent  Sural nerve sensation: absent      Left Foot/Ankle      Neurovascular  Dorsalis pedis: 1+  Posterior tibial: 1+  Saphenous nerve sensation: absent  Tibial nerve sensation: absent  Superficial peroneal nerve sensation: absent  Deep peroneal nerve sensation: absent  Sural nerve sensation: absent          Right plantar foot post deep tissue incision and drainage with debridement picture below:                  Laboratory:  All pertinent labs reviewed within the last 24  hours.    Diagnostic Results:  I have reviewed all pertinent imaging results/findings within the past 24 hours.

## 2024-01-29 NOTE — SUBJECTIVE & OBJECTIVE
"Interval History:  No pain or discomfort at his foot site.  No fever.  MRI done.  Discussed case with Podiatry    Review of Systems Complete ROS otherwise negative other than stated in HPI.   Objective:     Vital Signs (Most Recent):  Temp: 98 °F (36.7 °C) (01/29/24 0758)  Pulse: (!) 111 (01/29/24 0758)  Resp: 18 (01/29/24 0758)  BP: (!) 168/88 (01/29/24 0523)  SpO2: (!) 93 % (01/29/24 0758) Vital Signs (24h Range):  Temp:  [97.8 °F (36.6 °C)-99.4 °F (37.4 °C)] 98 °F (36.7 °C)  Pulse:  [] 111  Resp:  [16-18] 18  SpO2:  [93 %-98 %] 93 %  BP: (150-168)/(78-88) 168/88     Weight: 53.1 kg (117 lb 1 oz)  Body mass index is 18.33 kg/m².    Intake/Output Summary (Last 24 hours) at 1/29/2024 1038  Last data filed at 1/29/2024 0935  Gross per 24 hour   Intake 480 ml   Output --   Net 480 ml         Physical Exam  GENERAL:  Alert and oriented x 3.  Thin.  No distress  HEENT:  EOMI. Conjunctivae intact.   NECK:  Supple   LUNGS:  No respiratory distress.   ABDOMEN:  Soft,  Nontender and nondistended  EXTREMITIES:  No clubbing or cyanosis or edema.  Right foot burn lesions chronic and stable.  Heel lesion dressed, see wound care images          Significant Labs: All pertinent labs within the past 24 hours have been reviewed.  Blood Culture: No results for input(s): "LABBLOO" in the last 48 hours.  BMP:   Recent Labs   Lab 01/29/24 0552   *   *   K 4.4   CL 98   CO2 27   BUN 17   CREATININE 0.7   CALCIUM 8.0*   MG 2.0     CBC:   Recent Labs   Lab 01/28/24 0459 01/29/24 0552   WBC 14.00* 15.94*   HGB 11.6* 11.0*   HCT 35.0* 34.0*    308     CMP:   Recent Labs   Lab 01/28/24  0459 01/29/24  0552   * 132*   K 4.4 4.4   CL 95 98   CO2 25 27   * 463*   BUN 14 17   CREATININE 0.7 0.7   CALCIUM 8.1* 8.0*   ANIONGAP 8 7*       Significant Imaging: I have reviewed all pertinent imaging results/findings within the past 24 hours.  "
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
## 2024-01-29 NOTE — PROCEDURES
"Sergio Porter is a 48 y.o. male patient.    Temp: 98.9 °F (37.2 °C) (01/29/24 1225)  Pulse: 103 (01/29/24 1225)  Resp: 18 (01/29/24 1225)  BP: (!) 141/66 (01/29/24 1225)  SpO2: 95 % (01/29/24 1225)  Weight: 53.1 kg (117 lb 1 oz) (01/27/24 0858)  Height: 5' 7" (170.2 cm) (01/27/24 0858)       Procedures    1/29/2024    Preoperative diagnosis:  deep tissue abscess right foot  Postoperative diagnosis:  Same as above  Procedure:  Incision and drainage below fascia of multifocal abscesses right foot  Hemostasis:  None  Estimated blood loss:  15 mL   Specimen:  Deep swab deep tissue culture from right foot   Complications: None     Procedure in detail:  The right foot was prepped scrubbed and draped in normal aseptic manner.  A time-out was then called.  At this time attention was directed the patient's right plantar rearfoot where there was noted to be a large full-thickness ulceration right plantar foot.  There was a large area of palpable fluctuance, which was present in the plantar aspect of the rearfoot and extending into the midfoot and medial aspect of the midfoot foot.  There was noted to be fluctuance noted in the rearfoot, midfoot, and medial foot.   At this time utilizing a 15 blade an incision was made overlying the palpable fluctuance, extending from rearfoot to the medial foot and midfoot.  The dissection was then carried down to the deep tissue layers, below the plantar fascia. All abscesses were broken up and purulence drained from the foot.   Copious amounts of purulent drainage was drained from the foot.  All of this area was drained and there was noted to be significant purulence and necrotic tissue. Purulence was drained completely and necrotic tissue was debrided.   A deep culture was taken of this including the purulent drainage.    All of the abscess areas were then copiously irrigated with sterile saline. The foot was then dressed with aquacel ag,  4x4s, ABD pad, and kerlix.  Patient tolerated the " procedure well.       Will have wound care place a wound VAC to the right  foot.

## 2024-01-29 NOTE — CONSULTS
We would like to use NPWT to accelerate granulation tissue formation.   This cannot be achieved with topical dressing or medication.               3x5x1.2cm 1.5cm at the 1-2 oclock position, red, tissue maroon boggy tissue distal position of wound, small amount of oozing distal end of wound. Foot red dominique, placed 1 piece of green foam in wound and 1 piece of green tracked to dorsum foot.  Post op shoe applied and elevated on pillow. Notified Dr. Solomon of above.

## 2024-01-29 NOTE — ASSESSMENT & PLAN NOTE
Bedside incision and drainage, see procedure note     Wound vac ordered to right foot, set at 175 mmhg , 2 times a week changes, deep tissue cultures taken    Non weight bearing to right foot     Recommend LTAC following discharge for IV Abx, wound care with wound vac, and physical therapy.     Marco Antonio and protein drinks daily     Tight glucose control        Counseled patient on increasing protein intake, not getting wound wet, keeping dressing clean dry and intact, following a healthy diet, elevating legs when able, removing pressure from wound
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
## 2024-01-29 NOTE — PROGRESS NOTES
"[facility] Medicine  Progress Note    Patient Name: Sergio Porter  MRN: [id number]  Patient Class: IP- Inpatient   Admission Date: 1/26/2024  Length of Stay: 3 days  Attending Physician: Karuna Scott MD  Primary Care Provider: Aletha Henson FNP-C        Subjective:     Principal Problem:Cellulitis        HPI:  No notes on file    Overview/Hospital Course:  No notes on file    Interval History:  No pain or discomfort at his foot site.  No fever.  MRI done.  Discussed case with Podiatry    Review of Systems Complete ROS otherwise negative other than stated in HPI.   Objective:     Vital Signs (Most Recent):  Temp: 98 °F (36.7 °C) (01/29/24 0758)  Pulse: (!) 111 (01/29/24 0758)  Resp: 18 (01/29/24 0758)  BP: (!) 168/88 (01/29/24 0523)  SpO2: (!) 93 % (01/29/24 0758) Vital Signs (24h Range):  Temp:  [97.8 °F (36.6 °C)-99.4 °F (37.4 °C)] 98 °F (36.7 °C)  Pulse:  [] 111  Resp:  [16-18] 18  SpO2:  [93 %-98 %] 93 %  BP: (150-168)/(78-88) 168/88     Weight: 53.1 kg (117 lb 1 oz)  Body mass index is 18.33 kg/m².    Intake/Output Summary (Last 24 hours) at 1/29/2024 1038  Last data filed at 1/29/2024 0935  Gross per 24 hour   Intake 480 ml   Output --   Net 480 ml         Physical Exam  GENERAL:  Alert and oriented x 3.  Thin.  No distress  HEENT:  EOMI. Conjunctivae intact.   NECK:  Supple   LUNGS:  No respiratory distress.   ABDOMEN:  Soft,  Nontender and nondistended  EXTREMITIES:  No clubbing or cyanosis or edema.  Right foot burn lesions chronic and stable.  Heel lesion dressed, see wound care images          Significant Labs: All pertinent labs within the past 24 hours have been reviewed.  Blood Culture: No results for input(s): "LABBLOO" in the last 48 hours.  BMP:   Recent Labs   Lab 01/29/24  0552   *   *   K 4.4   CL 98   CO2 27   BUN 17   CREATININE 0.7   CALCIUM 8.0*   MG 2.0     CBC:   Recent Labs   Lab 01/28/24  0459 01/29/24  0552   WBC 14.00* 15.94*   HGB " 11.6* 11.0*   HCT 35.0* 34.0*    308     CMP:   Recent Labs   Lab 01/28/24  0459 01/29/24  0552   * 132*   K 4.4 4.4   CL 95 98   CO2 25 27   * 463*   BUN 14 17   CREATININE 0.7 0.7   CALCIUM 8.1* 8.0*   ANIONGAP 8 7*       Significant Imaging: I have reviewed all pertinent imaging results/findings within the past 24 hours.    Assessment/Plan:      Right foot diabetic wound with cellulitis and abscess:  Associated PAD, ongoing tobacco use, uncontrolled diabetes.  Had angiogram with revascularization 1/27.  Plan for drainage of abscess at bedside today by Podiatry.  Continue empiric antibiotics.  MRI shows no osteomyelitis.  Send abscess fluid for culture to guide antibiotic therapy.  Monitor WBC count.  Continue local wound care     Tobacco use: Does not wish to quit.  Refuses nicotine patch     Diabetes: Noncompliant with medications, poorly controlled.  A1c 13, repeat A1c pending.  Diabetes education.  Still uncontrolled.  Increase preprandial insulin to 8 units today.  Continue long-acting insulin 30 units in the morning, add 10 units at night, monitor and cover with moderate dose sliding scale     VTE Risk Mitigation (From admission, onward)           Ordered     enoxaparin injection 40 mg  Daily         01/26/24 1313     IP VTE HIGH RISK PATIENT  Once         01/26/24 1313     Place sequential compression device  Until discontinued         01/26/24 1313                    Discharge Planning   MADDIE: 1/31/2024     Code Status: Full Code   Is the patient medically ready for discharge?:     Reason for patient still in hospital (select all that apply): Treatment  Discharge Plan A: Home with family                  Karuna Scott MD  Department of Hospital Medicine   [facility]

## 2024-01-29 NOTE — PLAN OF CARE
CM noted LTAC consult placed by podiatry for IV abx, wound care with vac therapy, and physical therapy.  Referral sent to Post Acute Medical via brady and Maylin with [facility] notified.  Facility is reviewing.  CM following.     1515- Per Maylin with [facility], unable to accept patient due to being at their max for patient's payor source.  Will have [place] location look into accepting, but will likely not be able to accept either.  Additional referrals were sent to [facility] in [place] and [facility] in [place], [place]. Patient stated his next choice would be [facility] due to family living nearby there.  CM following.         01/29/24 1403   Post-Acute Status   Post-Acute Authorization Placement   Post-Acute Placement Status Referrals Sent   Discharge Plan   Discharge Plan A Long-term acute care facility (LTAC)   Discharge Plan B Long-term acute care facility (LTAC)
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
## 2024-01-30 LAB
ANION GAP SERPL CALC-SCNC: 5 MMOL/L (ref 8–16)
BUN SERPL-MCNC: 16 MG/DL (ref 6–20)
CALCIUM SERPL-MCNC: 7.9 MG/DL (ref 8.7–10.5)
CHLORIDE SERPL-SCNC: 100 MMOL/L (ref 95–110)
CO2 SERPL-SCNC: 29 MMOL/L (ref 23–29)
CREAT SERPL-MCNC: 0.6 MG/DL (ref 0.5–1.4)
ERYTHROCYTE [DISTWIDTH] IN BLOOD BY AUTOMATED COUNT: 13.4 % (ref 11.5–14.5)
EST. GFR  (NO RACE VARIABLE): >60 ML/MIN/1.73 M^2
GLUCOSE SERPL-MCNC: 172 MG/DL (ref 70–110)
GLUCOSE SERPL-MCNC: 244 MG/DL (ref 70–110)
GLUCOSE SERPL-MCNC: 310 MG/DL (ref 70–110)
GLUCOSE SERPL-MCNC: 313 MG/DL (ref 70–110)
GLUCOSE SERPL-MCNC: 445 MG/DL (ref 70–110)
HBA1C MFR BLD: 15.3 % (ref 4.8–5.6)
HCT VFR BLD AUTO: 30.7 % (ref 40–54)
HGB BLD-MCNC: 10 G/DL (ref 14–18)
MAGNESIUM SERPL-MCNC: 2 MG/DL (ref 1.6–2.6)
MCH RBC QN AUTO: 27.9 PG (ref 27–31)
MCHC RBC AUTO-ENTMCNC: 32.6 G/DL (ref 32–36)
MCV RBC AUTO: 86 FL (ref 82–98)
PLATELET # BLD AUTO: 315 K/UL (ref 150–450)
PMV BLD AUTO: 8.2 FL (ref 9.2–12.9)
POTASSIUM SERPL-SCNC: 3.6 MMOL/L (ref 3.5–5.1)
RBC # BLD AUTO: 3.59 M/UL (ref 4.6–6.2)
SODIUM SERPL-SCNC: 134 MMOL/L (ref 136–145)
VANCOMYCIN SERPL-MCNC: 5.6 UG/ML
WBC # BLD AUTO: 13.78 K/UL (ref 3.9–12.7)

## 2024-01-30 PROCEDURE — 25000003 PHARM REV CODE 250: Performed by: HOSPITALIST

## 2024-01-30 PROCEDURE — 63600175 PHARM REV CODE 636 W HCPCS: Mod: JZ,JG | Performed by: INTERNAL MEDICINE

## 2024-01-30 PROCEDURE — 25000003 PHARM REV CODE 250: Performed by: STUDENT IN AN ORGANIZED HEALTH CARE EDUCATION/TRAINING PROGRAM

## 2024-01-30 PROCEDURE — 85027 COMPLETE CBC AUTOMATED: CPT | Performed by: INTERNAL MEDICINE

## 2024-01-30 PROCEDURE — 83735 ASSAY OF MAGNESIUM: CPT | Performed by: INTERNAL MEDICINE

## 2024-01-30 PROCEDURE — 63600175 PHARM REV CODE 636 W HCPCS: Performed by: HOSPITALIST

## 2024-01-30 PROCEDURE — 36415 COLL VENOUS BLD VENIPUNCTURE: CPT | Performed by: INTERNAL MEDICINE

## 2024-01-30 PROCEDURE — 97164 PT RE-EVAL EST PLAN CARE: CPT

## 2024-01-30 PROCEDURE — 25000003 PHARM REV CODE 250: Performed by: INTERNAL MEDICINE

## 2024-01-30 PROCEDURE — 80048 BASIC METABOLIC PNL TOTAL CA: CPT | Performed by: INTERNAL MEDICINE

## 2024-01-30 PROCEDURE — 25000003 PHARM REV CODE 250: Performed by: SURGERY

## 2024-01-30 PROCEDURE — 12000002 HC ACUTE/MED SURGE SEMI-PRIVATE ROOM

## 2024-01-30 PROCEDURE — 97116 GAIT TRAINING THERAPY: CPT

## 2024-01-30 PROCEDURE — 99232 SBSQ HOSP IP/OBS MODERATE 35: CPT | Mod: ,,, | Performed by: PODIATRIST

## 2024-01-30 PROCEDURE — 80202 ASSAY OF VANCOMYCIN: CPT | Performed by: HOSPITALIST

## 2024-01-30 RX ORDER — LOPERAMIDE HYDROCHLORIDE 2 MG/1
2 CAPSULE ORAL 4 TIMES DAILY PRN
Status: DISCONTINUED | OUTPATIENT
Start: 2024-01-30 | End: 2024-02-01 | Stop reason: HOSPADM

## 2024-01-30 RX ORDER — METRONIDAZOLE 250 MG/1
500 TABLET ORAL EVERY 8 HOURS
Status: DISCONTINUED | OUTPATIENT
Start: 2024-01-30 | End: 2024-02-01 | Stop reason: HOSPADM

## 2024-01-30 RX ORDER — INSULIN ASPART 100 [IU]/ML
11 INJECTION, SOLUTION INTRAVENOUS; SUBCUTANEOUS
Status: DISCONTINUED | OUTPATIENT
Start: 2024-01-30 | End: 2024-02-01 | Stop reason: HOSPADM

## 2024-01-30 RX ORDER — VANCOMYCIN HCL IN 5 % DEXTROSE 1G/250ML
1000 PLASTIC BAG, INJECTION (ML) INTRAVENOUS
Status: DISCONTINUED | OUTPATIENT
Start: 2024-01-30 | End: 2024-01-30

## 2024-01-30 RX ADMIN — INSULIN DETEMIR 30 UNITS: 100 INJECTION, SOLUTION SUBCUTANEOUS at 10:01

## 2024-01-30 RX ADMIN — INSULIN ASPART 11 UNITS: 100 INJECTION, SOLUTION INTRAVENOUS; SUBCUTANEOUS at 12:01

## 2024-01-30 RX ADMIN — ENOXAPARIN SODIUM 40 MG: 40 INJECTION SUBCUTANEOUS at 05:01

## 2024-01-30 RX ADMIN — CEFEPIME 2 G: 2 INJECTION, POWDER, FOR SOLUTION INTRAVENOUS at 05:01

## 2024-01-30 RX ADMIN — INSULIN ASPART 10 UNITS: 100 INJECTION, SOLUTION INTRAVENOUS; SUBCUTANEOUS at 12:01

## 2024-01-30 RX ADMIN — INSULIN DETEMIR 10 UNITS: 100 INJECTION, SOLUTION SUBCUTANEOUS at 09:01

## 2024-01-30 RX ADMIN — VANCOMYCIN HYDROCHLORIDE 1000 MG: 1 INJECTION, POWDER, LYOPHILIZED, FOR SOLUTION INTRAVENOUS at 10:01

## 2024-01-30 RX ADMIN — METRONIDAZOLE 500 MG: 500 INJECTION, SOLUTION INTRAVENOUS at 05:01

## 2024-01-30 RX ADMIN — INSULIN ASPART 4 UNITS: 100 INJECTION, SOLUTION INTRAVENOUS; SUBCUTANEOUS at 05:01

## 2024-01-30 RX ADMIN — METRONIDAZOLE 500 MG: 250 TABLET ORAL at 09:01

## 2024-01-30 RX ADMIN — INSULIN ASPART 8 UNITS: 100 INJECTION, SOLUTION INTRAVENOUS; SUBCUTANEOUS at 10:01

## 2024-01-30 RX ADMIN — MULTIVITAMIN TABLET 1 TABLET: TABLET at 10:01

## 2024-01-30 RX ADMIN — CLOPIDOGREL BISULFATE 75 MG: 75 TABLET, FILM COATED ORAL at 10:01

## 2024-01-30 RX ADMIN — VANCOMYCIN HYDROCHLORIDE 1000 MG: 1 INJECTION, POWDER, LYOPHILIZED, FOR SOLUTION INTRAVENOUS at 05:01

## 2024-01-30 RX ADMIN — LOPERAMIDE HYDROCHLORIDE 2 MG: 2 CAPSULE ORAL at 11:01

## 2024-01-30 RX ADMIN — CEFEPIME 2 G: 2 INJECTION, POWDER, FOR SOLUTION INTRAVENOUS at 09:01

## 2024-01-30 RX ADMIN — METRONIDAZOLE 500 MG: 250 TABLET ORAL at 01:01

## 2024-01-30 RX ADMIN — INSULIN ASPART 11 UNITS: 100 INJECTION, SOLUTION INTRAVENOUS; SUBCUTANEOUS at 05:01

## 2024-01-30 RX ADMIN — CEFEPIME 2 G: 2 INJECTION, POWDER, FOR SOLUTION INTRAVENOUS at 01:01

## 2024-01-30 RX ADMIN — ATORVASTATIN CALCIUM 40 MG: 40 TABLET, FILM COATED ORAL at 09:01

## 2024-01-30 NOTE — SUBJECTIVE & OBJECTIVE
"Interval History:  He is feeling well.  Foot pain controlled.  Had drainage of abscess and debridement yesterday.  Tolerated well.  Having bowel movements    Review of Systems Complete ROS otherwise negative other than stated in HPI.   Objective:     Vital Signs (Most Recent):  Temp: 98.2 °F (36.8 °C) (01/30/24 0807)  Pulse: 92 (01/30/24 0807)  Resp: 18 (01/30/24 0807)  BP: (!) 149/88 (01/30/24 0807)  SpO2: (!) 92 % (01/30/24 0807) Vital Signs (24h Range):  Temp:  [98 °F (36.7 °C)-99.1 °F (37.3 °C)] 98.2 °F (36.8 °C)  Pulse:  [] 92  Resp:  [18] 18  SpO2:  [92 %-95 %] 92 %  BP: (131-176)/(66-89) 149/88     Weight: 53.1 kg (117 lb 1 oz)  Body mass index is 18.33 kg/m².    Intake/Output Summary (Last 24 hours) at 1/30/2024 1130  Last data filed at 1/30/2024 0809  Gross per 24 hour   Intake 1380 ml   Output --   Net 1380 ml         Physical Exam  GENERAL:  Alert and oriented x 3.  Thin.  No distress  HEENT:  EOMI. Conjunctivae intact.   NECK:  Supple   Cardio: Regular rate and rhythm  LUNGS:  No respiratory distress.  Clear to auscultation  ABDOMEN:  Soft,  Nontender and nondistended, bowel sounds present  EXTREMITIES:  No clubbing or cyanosis or edema.  Right foot with dressing and wound VAC in place        Significant Labs: All pertinent labs within the past 24 hours have been reviewed.  Blood Culture: No results for input(s): "LABBLOO" in the last 48 hours.  CBC:   Recent Labs   Lab 01/29/24  0552 01/30/24  0615   WBC 15.94* 13.78*   HGB 11.0* 10.0*   HCT 34.0* 30.7*    315     CMP:   Recent Labs   Lab 01/29/24  0552 01/30/24  0615   * 134*   K 4.4 3.6   CL 98 100   CO2 27 29   * 313*   BUN 17 16   CREATININE 0.7 0.6   CALCIUM 8.0* 7.9*   ANIONGAP 7* 5*       Significant Imaging: I have reviewed all pertinent imaging results/findings within the past 24 hours.  "

## 2024-01-30 NOTE — PT/OT/SLP RE-EVAL
"Physical Therapy Re-evaluation    Patient Name:  Sergio Porter   MRN:  [id number]    Recommendations:     Discharge Recommendations: Low Intensity Therapy  Discharge Equipment Recommendations: other (see comments) (knee scooter)   Barriers to discharge:  poor compliance with NWB on R LE    Assessment:     Sergio Porter is a 48 y.o. male admitted with a medical diagnosis of Cellulitis.  He presents with the following impairments/functional limitations: weakness, impaired endurance, impaired self care skills, impaired functional mobility, gait instability, impaired balance, decreased lower extremity function, decreased safety awareness, pain, impaired cardiopulmonary response to activity.    Pt found in bed with HOB elevated.Pt reports that he has been ambulating throughout the facility and outside. Pt educated on NWB on R LE. Pt reports he was unaware and thought "WB would be good for the healing". Discussed options for improved compliance with NWB such as self propelling in wheelchair with B UE, knee scooter or hopping with RW. Pt agreeable to trial Knee scooter this date with excellent form and balance greatest difficulty with maintain NWB during transfer on/off of knee scooter.    Rehab Prognosis:  fair; patient would benefit from acute skilled PT services to address these deficits and reach maximum level of function.      Recent Surgery: Procedure(s) (LRB):  ANGIOGRAM, EXTREMITY, BILATERAL (Right)  Angiogram, Abdominal Aorta (N/A)  Intravascular Ultrasound, Non-Coronary (N/A)  Stents, Iliac, Bilateral (Bilateral) 3 Days Post-Op    Plan:     During this hospitalization, patient to be seen 5 x/week to address the above listed problems via gait training, therapeutic activities, therapeutic exercises, neuromuscular re-education  Plan of Care Expires:  02/29/24  Plan of Care Reviewed with: patient    Subjective     Communicated with RN prior to session.  Patient found HOB elevated with peripheral IV, telemetry, wound " vac upon PT entry to room, agreeable to evaluation.      Chief Complaint: none  Patient comments/goals: smoke  Pain/Comfort:  Pain Rating 1: 0/10    Patients cultural, spiritual, Temple conflicts given the current situation: no      Objective:     Patient found with: peripheral IV, telemetry, wound vac     General Precautions: Standard, fall  Orthopedic Precautions: RLE non weight bearing  Braces: N/A  Respiratory Status: Room air    Exams:  RLE ROM: WFL  RLE Strength: WFL  LLE ROM: WFL  LLE Strength: WFL    Functional Mobility:  Bed Mobility:     Supine to Sit: independence  Sit to Supine: independence  Transfers:     Sit to Stand:  supervision and mod VI to maintain NWB on RLE with knee scooter  Gait: 125 ft with knee scooter and CGA    AM-PAC 6 CLICK MOBILITY  Total Score:19       Treatment and Education:   Pt educated on POC, discharge recommendation, importance of time OOB, NWB on R LE, mobility optimal to maintain NWB on RLE, need for assist with mobility, use of call bell to seek assistance as needed and fall prevention      Patient left HOB elevated with all lines intact and call button in reach.    GOALS:   Multidisciplinary Problems       Physical Therapy Goals          Problem: Physical Therapy    Goal Priority Disciplines Outcome Goal Variances Interventions   Physical Therapy Goal     PT, PT/OT Ongoing, Progressing     Description: Goals to be met by: 24     Patient will increase functional independence with mobility by performin. Bed to chair transfer with Supervision using knee scooter  2. Gait  x 150 feet with Supervision using knee scooter    All goals while maintaining NWB on RLE                             History:     Past Medical History:   Diagnosis Date    COVID-19     around 2022, per patient/wife    Diabetes mellitus, type 2     Dyslipidemia     Opioid abuse        Past Surgical History:   Procedure Laterality Date    ANGIOGRAM, ABDOMINAL AORTA N/A 2024     Procedure: Angiogram, Abdominal Aorta;  Surgeon: Bhavesh Pelletier MD;  Location: [facility] CATH/EP LAB;  Service: General;  Laterality: N/A;    ANGIOGRAM, EXTREMITY, BILATERAL Right 1/27/2024    Procedure: ANGIOGRAM, EXTREMITY, BILATERAL;  Surgeon: Bhavesh Pelletier MD;  Location: [facility] CATH/EP LAB;  Service: General;  Laterality: Right;    INCISION AND DRAINAGE OF ABSCESS N/A 9/16/2022    Procedure: INCISION AND DRAINAGE, ABSCESS- BACK;  Surgeon: Glenn Fraser MD;  Location: [facility];  Service: General;  Laterality: N/A;    INTRAVASCULAR ULTRASOUND, NON-CORONARY N/A 1/27/2024    Procedure: Intravascular Ultrasound, Non-Coronary;  Surgeon: Bhavesh Pelletier MD;  Location: [facility] CATH/EP LAB;  Service: General;  Laterality: N/A;    STENTS, ILIAC, BILATERAL Bilateral 1/27/2024    Procedure: Stents, Iliac, Bilateral;  Surgeon: Bhavesh Pelletier MD;  Location: [facility] CATH/EP LAB;  Service: General;  Laterality: Bilateral;       Time Tracking:     PT Received On: 01/30/24  PT Start Time: 1017     PT Stop Time: 1030  PT Total Time (min): 13 min     Billable Minutes: Re-eval 5 and Gait Training 8      01/30/2024

## 2024-01-30 NOTE — PROGRESS NOTES
Pharmacokinetic Assessment Follow Up: IV Vancomycin    Vancomycin serum concentration assessment(s):    The trough level was drawn correctly and can be used to guide therapy at this time. The measurement is above the desired definitive target range of 10 to 15 mcg/mL.    Vancomycin Regimen Plan:    Discontinue the scheduled vancomycin regimen and re-dose when the random level is less than 15 mcg/mL, next level to be drawn at 0430 on 01/30/2025.    Drug levels (last 3 results):  Recent Labs   Lab Result Units 01/28/24  0028 01/28/24  1401 01/29/24  1349   Vancomycin-Trough ug/mL 4.4* 5.7* 47.9*       Pharmacy will continue to follow and monitor vancomycin.    Please contact pharmacy at extension [phone] for questions regarding this assessment.    Thank you for the consult,   Tram Sheikh       Patient brief summary:  Sergio Porter is a 48 y.o. male initiated on antimicrobial therapy with IV Vancomycin for treatment of skin & soft tissue infection    The patient's current regimen is 1000mg Q12H    Drug Allergies:   Review of patient's allergies indicates:  No Known Allergies    Actual Body Weight:   53.1kg    Renal Function:   Estimated Creatinine Clearance: 96.9 mL/min (based on SCr of 0.7 mg/dL).,     Dialysis Method (if applicable):  N/A    CBC (last 72 hours):  Recent Labs   Lab Result Units 01/27/24  0517 01/28/24  0459 01/29/24  0552   WBC K/uL 15.39* 14.00* 15.94*   Hemoglobin g/dL 10.6* 11.6* 11.0*   Hemoglobin A1C %  --  >15.0*  --    Hematocrit % 32.4* 35.0* 34.0*   Platelets K/uL 298 319 308       Metabolic Panel (last 72 hours):  Recent Labs   Lab Result Units 01/27/24  0517 01/28/24  0459 01/29/24  0552   Sodium mmol/L 132* 128* 132*   Potassium mmol/L 3.7 4.4 4.4   Chloride mmol/L 98 95 98   CO2 mmol/L 29 25 27   Glucose mg/dL 137* 552* 463*   BUN mg/dL 16 14 17   Creatinine mg/dL 0.8 0.7 0.7   Magnesium mg/dL 1.8 1.9 2.0       Vancomycin Administrations:  vancomycin given in the last 96 hours                      vancomycin in dextrose 5 % 1 gram/250 mL IVPB 1,000 mg (mg) 1,000 mg New Bag 01/29/24 1445     1,000 mg New Bag  0350     1,000 mg New Bag 01/28/24 1532    vancomycin 750 mg in dextrose 5 % 250 mL IVPB (ready to mix) (mg) 750 mg New Bag 01/28/24 0325    vancomycin 750 mg in dextrose 5 % 250 mL IVPB (ready to mix) (mg) 750 mg New Bag 01/27/24 1408     750 mg New Bag  0158    vancomycin 1.25 g in dextrose 5% 250 mL IVPB (ready to mix) (mg) 1,250 mg New Bag 01/26/24 1503                    Microbiologic Results:  Microbiology Results (last 7 days)       Procedure Component Value Units Date/Time    Culture, Anaerobic [[id number]] Collected: 01/29/24 1356    Order Status: Sent Specimen: Abscess from Foot, Right Updated: 01/29/24 1403    Aerobic culture [[id number]] Collected: 01/29/24 1356    Order Status: Sent Specimen: Abscess from Foot, Right Updated: 01/29/24 1403    Blood culture x two cultures. Draw prior to antibiotics. [[id number]] Collected: 01/26/24 1041    Order Status: Completed Specimen: Blood Updated: 01/29/24 1232     Blood Culture, Routine No Growth to date      No Growth to date      No Growth to date      No Growth to date    Narrative:      Aerobic and anaerobic    Blood culture x two cultures. Draw prior to antibiotics. [[id number]] Collected: 01/26/24 1049    Order Status: Completed Specimen: Blood from Antecubital, Left Updated: 01/29/24 1232     Blood Culture, Routine No Growth to date      No Growth to date      No Growth to date      No Growth to date    Narrative:      Aerobic and anaerobic

## 2024-01-30 NOTE — PLAN OF CARE
[facility] denied due to not accepting patients at this time.  The closest facility that is in-network with payor source is 69 miles away from patient's home and family.  This is a transportation issue.  [facility] in [place], [place] is able to accept pending the approval from patient's insurance.       01/30/24 1523   Post-Acute Status   Post-Acute Authorization Placement   Post-Acute Placement Status Referrals Sent   Patient choice form signed by patient/caregiver List with quality metrics by geographic area provided   Discharge Plan   Discharge Plan A Long-term acute care facility (LTAC)   Discharge Plan B Long-term acute care facility (LTAC)

## 2024-01-30 NOTE — SUBJECTIVE & OBJECTIVE
Subjective:     Interval History: Patient resting in bed. Denies complaints of pain to either extremity. Denies any new complaints.   Wound vac on and operating.       Follow-up For: Procedure(s) (LRB):  ANGIOGRAM, EXTREMITY, BILATERAL (Right)  Angiogram, Abdominal Aorta (N/A)  Intravascular Ultrasound, Non-Coronary (N/A)  Stents, Iliac, Bilateral (Bilateral)    Post-Operative Day: 3 Days Post-Op    Scheduled Meds:   atorvastatin  40 mg Oral QHS    ceFEPime IV (PEDS and ADULTS)  2 g Intravenous Q8H    clopidogreL  75 mg Oral Daily    collagenase   Topical (Top) Twice Weekly    enoxparin  40 mg Subcutaneous Daily    insulin aspart U-100  8 Units Subcutaneous TIDWM    insulin detemir U-100  10 Units Subcutaneous QHS    insulin detemir U-100  30 Units Subcutaneous Daily    metronidazole  500 mg Intravenous Q8H    multivitamin  1 tablet Oral Daily    vancomycin (VANCOCIN) IV (PEDS and ADULTS)  1,000 mg Intravenous Q8H     Continuous Infusions:  PRN Meds:acetaminophen, acetaminophen, dextrose 50%, dextrose 50%, glucagon (human recombinant), glucose, glucose, HYDROcodone-acetaminophen, insulin aspart U-100, melatonin, naloxone, ondansetron, potassium bicarbonate, potassium bicarbonate, potassium bicarbonate, sodium chloride 0.9%, Pharmacy to dose Vancomycin consult **AND** vancomycin - pharmacy to dose    Review of Systems  Objective:     Vital Signs (Most Recent):  Temp: 98.2 °F (36.8 °C) (01/30/24 0807)  Pulse: 92 (01/30/24 0807)  Resp: 18 (01/30/24 0807)  BP: (!) 149/88 (01/30/24 0807)  SpO2: (!) 92 % (01/30/24 0807) Vital Signs (24h Range):  Temp:  [98 °F (36.7 °C)-99.1 °F (37.3 °C)] 98.2 °F (36.8 °C)  Pulse:  [] 92  Resp:  [18] 18  SpO2:  [92 %-95 %] 92 %  BP: (131-176)/(66-89) 149/88     Weight: 53.1 kg (117 lb 1 oz)  Body mass index is 18.33 kg/m².    Foot Exam                  Laboratory:  All pertinent labs reviewed within the last 24 hours.    Diagnostic Results:  I have reviewed all pertinent imaging  results/findings within the past 24 hours.

## 2024-01-30 NOTE — PLAN OF CARE
CM received phone [facility] [place] stating patient is accepted to their facility and they have already spoken to the patient regarding acceptance.  CM met with patient at bedside to discuss discharge plan.  Patient stated it is a little far but is agreeable to go if [facility] in [place] is unable to accept him by time of discharge.      At this time patient will be discharging to [facility] in [place] when medically cleared.  CM following.     1247- CM received call from Terri with [facility] in [place] stating they can accept the patient tomorrow, 1/31.  Patient prefers this facility, so plan will be to discharge here when medically cleared to do so.  CM following.      01/30/24 1201   Post-Acute Status   Post-Acute Authorization Placement   Post-Acute Placement Status Pending medical clearance/testing   Hospital Resources/Appts/Education Provided Post-Acute resouces added to AVS   Patient choice form signed by patient/caregiver List with quality metrics by geographic area provided   Discharge Plan   Discharge Plan A Long-term acute care facility (LTAC)   Discharge Plan B Long-term acute care facility (LTAC)

## 2024-01-30 NOTE — ASSESSMENT & PLAN NOTE
Continue Wound vac ordered to right foot, set at 175 mmhg , 2 times a week changes, deep tissue cultures taken    Non weight bearing to right foot     Recommend LTAC following discharge for IV Abx, wound care with wound vac, and physical therapy.     Marco Antonio and protein drinks daily     Tight glucose control        Counseled patient on increasing protein intake, not getting wound wet, keeping dressing clean dry and intact, following a healthy diet, elevating legs when able, removing pressure from wound

## 2024-01-30 NOTE — PROGRESS NOTES
"Formerly [facility] Medicine  Progress Note    Patient Name: Sergio Porter  MRN: [id number]  Patient Class: IP- Inpatient   Admission Date: 1/26/2024  Length of Stay: 4 days  Attending Physician: Karuna Scott MD  Primary Care Provider: Aletha Henson FNP-C        Subjective:     Principal Problem:Cellulitis        HPI:  No notes on file    Overview/Hospital Course:  No notes on file    Interval History:  He is feeling well.  Foot pain controlled.  Had drainage of abscess and debridement yesterday.  Tolerated well.  Having bowel movements    Review of Systems Complete ROS otherwise negative other than stated in HPI.   Objective:     Vital Signs (Most Recent):  Temp: 98.2 °F (36.8 °C) (01/30/24 0807)  Pulse: 92 (01/30/24 0807)  Resp: 18 (01/30/24 0807)  BP: (!) 149/88 (01/30/24 0807)  SpO2: (!) 92 % (01/30/24 0807) Vital Signs (24h Range):  Temp:  [98 °F (36.7 °C)-99.1 °F (37.3 °C)] 98.2 °F (36.8 °C)  Pulse:  [] 92  Resp:  [18] 18  SpO2:  [92 %-95 %] 92 %  BP: (131-176)/(66-89) 149/88     Weight: 53.1 kg (117 lb 1 oz)  Body mass index is 18.33 kg/m².    Intake/Output Summary (Last 24 hours) at 1/30/2024 1130  Last data filed at 1/30/2024 0809  Gross per 24 hour   Intake 1380 ml   Output --   Net 1380 ml         Physical Exam  GENERAL:  Alert and oriented x 3.  Thin.  No distress  HEENT:  EOMI. Conjunctivae intact.   NECK:  Supple   Cardio: Regular rate and rhythm  LUNGS:  No respiratory distress.  Clear to auscultation  ABDOMEN:  Soft,  Nontender and nondistended, bowel sounds present  EXTREMITIES:  No clubbing or cyanosis or edema.  Right foot with dressing and wound VAC in place        Significant Labs: All pertinent labs within the past 24 hours have been reviewed.  Blood Culture: No results for input(s): "LABBLOO" in the last 48 hours.  CBC:   Recent Labs   Lab 01/29/24  0552 01/30/24  0615   WBC 15.94* 13.78*   HGB 11.0* 10.0*   HCT 34.0* 30.7*    315     CMP:   Recent Labs "   Lab 01/29/24  0552 01/30/24  0615   * 134*   K 4.4 3.6   CL 98 100   CO2 27 29   * 313*   BUN 17 16   CREATININE 0.7 0.6   CALCIUM 8.0* 7.9*   ANIONGAP 7* 5*       Significant Imaging: I have reviewed all pertinent imaging results/findings within the past 24 hours.    Assessment/Plan:      Right foot diabetic wound with cellulitis and abscess:  Associated PAD, ongoing tobacco use, uncontrolled diabetes.  Had angiogram with revascularization 1/27. s/p  drainage of abscess at bedside 1/29 by Podiatry.  Continue empiric antibiotics awaiting wound culture.  Change Flagyl to p.o. plan for 2 weeks IV antibiotics pending culture results.  Has wound VAC in place.  Leukocytosis improving, monitor.  Plan for placement for IV antibiotics, wound care, and therapy pending culture results     Tobacco use:  Counseled.  Refuses nicotine patch     Diabetes: Noncompliant with medications, poorly controlled.  A1c 15. Diabetes education.  Still intermittently uncontrolled.  Increase preprandial insulin to 11 units today.  Continue long-acting insulin 30 units in the morning, 10 units at night, monitor and cover with moderate dose sliding scale.  Mix antibiotics with normal saline      VTE Risk Mitigation (From admission, onward)           Ordered     enoxaparin injection 40 mg  Daily         01/26/24 1313     IP VTE HIGH RISK PATIENT  Once         01/26/24 1313     Place sequential compression device  Until discontinued         01/26/24 1313                    Discharge Planning   MADDIE: 2/1/2024     Code Status: Full Code   Is the patient medically ready for discharge?:     Reason for patient still in hospital (select all that apply): Patient trending condition, Laboratory test, and Treatment  Discharge Plan A: Long-term acute care facility (LTAC)                  Karuna Scott MD  Department of Hospital Medicine   [facility]

## 2024-01-30 NOTE — PROGRESS NOTES
Formerly [facility]  Podiatry  Progress Note    Patient Name: Sergio Porter  MRN: [id number]  Admission Date: 1/26/2024  Hospital Length of Stay: 4 days  Attending Physician: Karuna Scott MD  Primary Care Provider: Aletha Henson FNP-C     Subjective:     Interval History: Patient resting in bed. Denies complaints of pain to either extremity. Denies any new complaints.   Wound vac on and operating.       Follow-up For: Procedure(s) (LRB):  ANGIOGRAM, EXTREMITY, BILATERAL (Right)  Angiogram, Abdominal Aorta (N/A)  Intravascular Ultrasound, Non-Coronary (N/A)  Stents, Iliac, Bilateral (Bilateral)    Post-Operative Day: 3 Days Post-Op    Scheduled Meds:   atorvastatin  40 mg Oral QHS    ceFEPime IV (PEDS and ADULTS)  2 g Intravenous Q8H    clopidogreL  75 mg Oral Daily    collagenase   Topical (Top) Twice Weekly    enoxparin  40 mg Subcutaneous Daily    insulin aspart U-100  8 Units Subcutaneous TIDWM    insulin detemir U-100  10 Units Subcutaneous QHS    insulin detemir U-100  30 Units Subcutaneous Daily    metronidazole  500 mg Intravenous Q8H    multivitamin  1 tablet Oral Daily    vancomycin (VANCOCIN) IV (PEDS and ADULTS)  1,000 mg Intravenous Q8H     Continuous Infusions:  PRN Meds:acetaminophen, acetaminophen, dextrose 50%, dextrose 50%, glucagon (human recombinant), glucose, glucose, HYDROcodone-acetaminophen, insulin aspart U-100, melatonin, naloxone, ondansetron, potassium bicarbonate, potassium bicarbonate, potassium bicarbonate, sodium chloride 0.9%, Pharmacy to dose Vancomycin consult **AND** vancomycin - pharmacy to dose    Review of Systems  Objective:     Vital Signs (Most Recent):  Temp: 98.2 °F (36.8 °C) (01/30/24 0807)  Pulse: 92 (01/30/24 0807)  Resp: 18 (01/30/24 0807)  BP: (!) 149/88 (01/30/24 0807)  SpO2: (!) 92 % (01/30/24 0807) Vital Signs (24h Range):  Temp:  [98 °F (36.7 °C)-99.1 °F (37.3 °C)] 98.2 °F (36.8 °C)  Pulse:  [] 92  Resp:  [18] 18  SpO2:  [92 %-95 %] 92 %  BP:  (131-176)/(66-89) 149/88     Weight: 53.1 kg (117 lb 1 oz)  Body mass index is 18.33 kg/m².    Foot Exam                  Laboratory:  All pertinent labs reviewed within the last 24 hours.    Diagnostic Results:  I have reviewed all pertinent imaging results/findings within the past 24 hours.  Assessment/Plan:     ID  Abscess of right foot       Continue Wound vac ordered to right foot, set at 175 mmhg , 2 times a week changes, deep tissue cultures taken    Non weight bearing to right foot     Recommend LTAC following discharge for IV Abx, wound care with wound vac, and physical therapy.     Marco Antonio and protein drinks daily     Tight glucose control        Counseled patient on increasing protein intake, not getting wound wet, keeping dressing clean dry and intact, following a healthy diet, elevating legs when able, removing pressure from wound        Linda Solomon DPM  Podiatry  [facility]

## 2024-01-30 NOTE — PLAN OF CARE
CM received message from Maylin with [facility] stating patient can be accepted and the [place] facility but will need 3 in network denials first due to payor source.     [facility] is reviewing via [employer].  CM called and left message with admissions to follow up.  Awaiting call back at this time.      [facility] is also out of network with patient's insurance and will need 3 in network denials in order to request a single case agreement.      Additional ref[facility] being [facility]        01/30/24 1028   Post-Acute Status   Post-Acute Authorization Placement   Post-Acute Placement Status Referrals Sent   Patient choice form signed by patient/caregiver List with quality metrics by geographic area provided   Discharge Plan   Discharge Plan A Long-term acute care facility (LTAC)   Discharge Plan B Long-term acute care facility (LTAC)

## 2024-01-30 NOTE — PROGRESS NOTES
Pharmacokinetic Assessment Follow Up: IV Vancomycin    Vancomycin serum concentration assessment(s):    The random level was drawn correctly and can be used to guide therapy at this time. The measurement is below the desired definitive target range of 10 to 15 mcg/mL.    Vancomycin Regimen Plan:    Change regimen to Vancomycin 1000 mg IV every 8 hours with next serum trough concentration measured at 0700 prior to 0800 dose on 01/31/2024.    Drug levels (last 3 results):  Recent Labs   Lab Result Units 01/28/24  0028 01/28/24  1401 01/29/24  1349 01/30/24  0615   Vancomycin, Random ug/mL  --   --   --  5.6   Vancomycin-Trough ug/mL 4.4* 5.7* 47.9*  --        Pharmacy will continue to follow and monitor vancomycin.    Please contact pharmacy at extension [phone] for questions regarding this assessment.    Thank you for the consult,   Emerosn Samuel       Patient brief summary:  Sergio Porter is a 48 y.o. male initiated on antimicrobial therapy with IV Vancomycin for treatment of skin & soft tissue infection    The patient's current regimen is Vancomycin 1000 mg every 8 hours.    Drug Allergies:   Review of patient's allergies indicates:  No Known Allergies    Actual Body Weight:   53.1    Renal Function:   Estimated Creatinine Clearance: 113.1 mL/min (based on SCr of 0.6 mg/dL).,     Dialysis Method (if applicable):  N/A    CBC (last 72 hours):  Recent Labs   Lab Result Units 01/28/24  0459 01/29/24  0552 01/30/24  0615   WBC K/uL 14.00* 15.94* 13.78*   Hemoglobin g/dL 11.6* 11.0* 10.0*   Hemoglobin A1C % >15.0*  --   --    Hematocrit % 35.0* 34.0* 30.7*   Platelets K/uL 319 308 315       Metabolic Panel (last 72 hours):  Recent Labs   Lab Result Units 01/28/24  0459 01/29/24  0552 01/30/24  0615   Sodium mmol/L 128* 132* 134*   Potassium mmol/L 4.4 4.4 3.6   Chloride mmol/L 95 98 100   CO2 mmol/L 25 27 29   Glucose mg/dL 552* 463* 313*   BUN mg/dL 14 17 16   Creatinine mg/dL 0.7 0.7 0.6   Magnesium mg/dL 1.9 2.0 2.0        Vancomycin Administrations:  vancomycin given in the last 96 hours                     vancomycin in dextrose 5 % 1 gram/250 mL IVPB 1,000 mg (mg) 1,000 mg New Bag 01/29/24 1445     1,000 mg New Bag  0350     1,000 mg New Bag 01/28/24 1532    vancomycin 750 mg in dextrose 5 % 250 mL IVPB (ready to mix) (mg) 750 mg New Bag 01/28/24 0325    vancomycin 750 mg in dextrose 5 % 250 mL IVPB (ready to mix) (mg) 750 mg New Bag 01/27/24 1408     750 mg New Bag  0158    vancomycin 1.25 g in dextrose 5% 250 mL IVPB (ready to mix) (mg) 1,250 mg New Bag 01/26/24 1503                    Microbiologic Results:  Microbiology Results (last 7 days)       Procedure Component Value Units Date/Time    Aerobic culture [[id number]] Collected: 01/29/24 1356    Order Status: Completed Specimen: Abscess from Foot, Right Updated: 01/30/24 0746     Aerobic Bacterial Culture Insufficient incubation, culture in progress    Culture, Anaerobic [[id number]] Collected: 01/29/24 1356    Order Status: Sent Specimen: Abscess from Foot, Right Updated: 01/29/24 1403    Blood culture x two cultures. Draw prior to antibiotics. [[id number]] Collected: 01/26/24 1041    Order Status: Completed Specimen: Blood Updated: 01/29/24 1232     Blood Culture, Routine No Growth to date      No Growth to date      No Growth to date      No Growth to date    Narrative:      Aerobic and anaerobic    Blood culture x two cultures. Draw prior to antibiotics. [[id number]] Collected: 01/26/24 1049    Order Status: Completed Specimen: Blood from Antecubital, Left Updated: 01/29/24 1232     Blood Culture, Routine No Growth to date      No Growth to date      No Growth to date      No Growth to date    Narrative:      Aerobic and anaerobic

## 2024-01-31 LAB
ANION GAP SERPL CALC-SCNC: 9 MMOL/L (ref 8–16)
BACTERIA BLD CULT: NORMAL
BACTERIA BLD CULT: NORMAL
BUN SERPL-MCNC: 24 MG/DL (ref 6–20)
CALCIUM SERPL-MCNC: 8.2 MG/DL (ref 8.7–10.5)
CHLORIDE SERPL-SCNC: 102 MMOL/L (ref 95–110)
CO2 SERPL-SCNC: 27 MMOL/L (ref 23–29)
CREAT SERPL-MCNC: 0.8 MG/DL (ref 0.5–1.4)
ERYTHROCYTE [DISTWIDTH] IN BLOOD BY AUTOMATED COUNT: 13.6 % (ref 11.5–14.5)
EST. GFR  (NO RACE VARIABLE): >60 ML/MIN/1.73 M^2
GLUCOSE SERPL-MCNC: 157 MG/DL (ref 70–110)
GLUCOSE SERPL-MCNC: 196 MG/DL (ref 70–110)
GLUCOSE SERPL-MCNC: 202 MG/DL (ref 70–110)
GLUCOSE SERPL-MCNC: 234 MG/DL (ref 70–110)
GLUCOSE SERPL-MCNC: 282 MG/DL (ref 70–110)
HCT VFR BLD AUTO: 30.5 % (ref 40–54)
HGB BLD-MCNC: 9.7 G/DL (ref 14–18)
MAGNESIUM SERPL-MCNC: 2.2 MG/DL (ref 1.6–2.6)
MCH RBC QN AUTO: 27.6 PG (ref 27–31)
MCHC RBC AUTO-ENTMCNC: 31.8 G/DL (ref 32–36)
MCV RBC AUTO: 87 FL (ref 82–98)
PLATELET # BLD AUTO: 350 K/UL (ref 150–450)
PMV BLD AUTO: 8.4 FL (ref 9.2–12.9)
POTASSIUM SERPL-SCNC: 3.9 MMOL/L (ref 3.5–5.1)
RBC # BLD AUTO: 3.51 M/UL (ref 4.6–6.2)
SODIUM SERPL-SCNC: 138 MMOL/L (ref 136–145)
VANCOMYCIN TROUGH SERPL-MCNC: 16.4 UG/ML
WBC # BLD AUTO: 12.06 K/UL (ref 3.9–12.7)

## 2024-01-31 PROCEDURE — 25000003 PHARM REV CODE 250: Performed by: SURGERY

## 2024-01-31 PROCEDURE — 83735 ASSAY OF MAGNESIUM: CPT | Performed by: INTERNAL MEDICINE

## 2024-01-31 PROCEDURE — 12000002 HC ACUTE/MED SURGE SEMI-PRIVATE ROOM

## 2024-01-31 PROCEDURE — 25000003 PHARM REV CODE 250: Performed by: INTERNAL MEDICINE

## 2024-01-31 PROCEDURE — 99232 SBSQ HOSP IP/OBS MODERATE 35: CPT | Mod: ,,, | Performed by: PODIATRIST

## 2024-01-31 PROCEDURE — 97116 GAIT TRAINING THERAPY: CPT

## 2024-01-31 PROCEDURE — 36415 COLL VENOUS BLD VENIPUNCTURE: CPT | Performed by: INTERNAL MEDICINE

## 2024-01-31 PROCEDURE — 36415 COLL VENOUS BLD VENIPUNCTURE: CPT | Performed by: HOSPITALIST

## 2024-01-31 PROCEDURE — 63600175 PHARM REV CODE 636 W HCPCS: Performed by: HOSPITALIST

## 2024-01-31 PROCEDURE — 25000003 PHARM REV CODE 250: Performed by: STUDENT IN AN ORGANIZED HEALTH CARE EDUCATION/TRAINING PROGRAM

## 2024-01-31 PROCEDURE — 80202 ASSAY OF VANCOMYCIN: CPT | Performed by: HOSPITALIST

## 2024-01-31 PROCEDURE — 85027 COMPLETE CBC AUTOMATED: CPT | Performed by: INTERNAL MEDICINE

## 2024-01-31 PROCEDURE — 80048 BASIC METABOLIC PNL TOTAL CA: CPT | Performed by: INTERNAL MEDICINE

## 2024-01-31 PROCEDURE — 63600175 PHARM REV CODE 636 W HCPCS: Performed by: INTERNAL MEDICINE

## 2024-01-31 PROCEDURE — 25000003 PHARM REV CODE 250: Performed by: HOSPITALIST

## 2024-01-31 RX ORDER — VANCOMYCIN HCL IN 5 % DEXTROSE 1G/250ML
1000 PLASTIC BAG, INJECTION (ML) INTRAVENOUS
Status: DISCONTINUED | OUTPATIENT
Start: 2024-01-31 | End: 2024-01-31

## 2024-01-31 RX ADMIN — CEFEPIME 2 G: 2 INJECTION, POWDER, FOR SOLUTION INTRAVENOUS at 05:01

## 2024-01-31 RX ADMIN — ATORVASTATIN CALCIUM 40 MG: 40 TABLET, FILM COATED ORAL at 09:01

## 2024-01-31 RX ADMIN — CEFEPIME 2 G: 2 INJECTION, POWDER, FOR SOLUTION INTRAVENOUS at 12:01

## 2024-01-31 RX ADMIN — INSULIN DETEMIR 10 UNITS: 100 INJECTION, SOLUTION SUBCUTANEOUS at 09:01

## 2024-01-31 RX ADMIN — MULTIVITAMIN TABLET 1 TABLET: TABLET at 09:01

## 2024-01-31 RX ADMIN — CLOPIDOGREL BISULFATE 75 MG: 75 TABLET, FILM COATED ORAL at 09:01

## 2024-01-31 RX ADMIN — INSULIN ASPART 2 UNITS: 100 INJECTION, SOLUTION INTRAVENOUS; SUBCUTANEOUS at 05:01

## 2024-01-31 RX ADMIN — CEFEPIME 2 G: 2 INJECTION, POWDER, FOR SOLUTION INTRAVENOUS at 09:01

## 2024-01-31 RX ADMIN — ENOXAPARIN SODIUM 40 MG: 40 INJECTION SUBCUTANEOUS at 05:01

## 2024-01-31 RX ADMIN — INSULIN ASPART 11 UNITS: 100 INJECTION, SOLUTION INTRAVENOUS; SUBCUTANEOUS at 09:01

## 2024-01-31 RX ADMIN — METRONIDAZOLE 500 MG: 250 TABLET ORAL at 09:01

## 2024-01-31 RX ADMIN — LOPERAMIDE HYDROCHLORIDE 2 MG: 2 CAPSULE ORAL at 09:01

## 2024-01-31 RX ADMIN — VANCOMYCIN HYDROCHLORIDE 1000 MG: 1 INJECTION, POWDER, LYOPHILIZED, FOR SOLUTION INTRAVENOUS at 09:01

## 2024-01-31 RX ADMIN — INSULIN ASPART 3 UNITS: 100 INJECTION, SOLUTION INTRAVENOUS; SUBCUTANEOUS at 09:01

## 2024-01-31 RX ADMIN — METRONIDAZOLE 500 MG: 250 TABLET ORAL at 02:01

## 2024-01-31 RX ADMIN — INSULIN ASPART 11 UNITS: 100 INJECTION, SOLUTION INTRAVENOUS; SUBCUTANEOUS at 05:01

## 2024-01-31 RX ADMIN — VANCOMYCIN HYDROCHLORIDE 1000 MG: 1 INJECTION, POWDER, LYOPHILIZED, FOR SOLUTION INTRAVENOUS at 02:01

## 2024-01-31 RX ADMIN — METRONIDAZOLE 500 MG: 250 TABLET ORAL at 05:01

## 2024-01-31 RX ADMIN — INSULIN ASPART 11 UNITS: 100 INJECTION, SOLUTION INTRAVENOUS; SUBCUTANEOUS at 12:01

## 2024-01-31 RX ADMIN — LOPERAMIDE HYDROCHLORIDE 2 MG: 2 CAPSULE ORAL at 02:01

## 2024-01-31 RX ADMIN — VANCOMYCIN HYDROCHLORIDE 1000 MG: 1 INJECTION, POWDER, LYOPHILIZED, FOR SOLUTION INTRAVENOUS at 11:01

## 2024-01-31 NOTE — PT/OT/SLP PROGRESS
Physical Therapy Treatment    Patient Name:  Sergio Porter   MRN:  [id number]    Recommendations:     Discharge Recommendations: Low Intensity Therapy  Discharge Equipment Recommendations: other (see comments) (knee scooter)  Barriers to discharge:  poor compliance with NWB on R LE    Assessment:     Sergio Porter is a 48 y.o. male admitted with a medical diagnosis of Cellulitis.  He presents with the following impairments/functional limitations: weakness, impaired endurance, impaired self care skills, impaired functional mobility, gait instability, impaired balance, decreased lower extremity function, decreased safety awareness, pain, impaired cardiopulmonary response to activity.    Pt found in bed with HOB elevated. Multiple family members present. Pt reports that he has been ambulating throughout the facility and outside despite education on importance of remaining NWB RLE. Pt mobilizes using knee scooter with excellent form and balance greatest difficulty with maintain NWB during transfer on/off of knee scooter. Provided patient with wheelchair to use between session to improved NWB compliance    Rehab Prognosis: Fair; patient would benefit from acute skilled PT services to address these deficits and reach maximum level of function.    Recent Surgery: Procedure(s) (LRB):  ANGIOGRAM, EXTREMITY, BILATERAL (Right)  Angiogram, Abdominal Aorta (N/A)  Intravascular Ultrasound, Non-Coronary (N/A)  Stents, Iliac, Bilateral (Bilateral) 4 Days Post-Op    Plan:     During this hospitalization, patient to be seen 5 x/week to address the identified rehab impairments via gait training, therapeutic activities, therapeutic exercises, neuromuscular re-education and progress toward the following goals:    Plan of Care Expires:  02/29/24    Subjective     Chief Complaint: none stated  Patient/Family Comments/goals: mobilize  Pain/Comfort:  Pain Rating 1: 0/10      Objective:     Communicated with RN prior to session.  Patient  found HOB elevated with peripheral IV, telemetry, wound vac upon PT entry to room.     General Precautions: Standard, fall  Orthopedic Precautions: RLE non weight bearing  Braces: N/A  Respiratory Status: Room air     Functional Mobility:  Bed Mobility:     Supine to Sit: independence  Transfers:     Sit to Stand:  supervision and VI for NWB R LE with knee scooter  Gait: 150 ft with RW and knee scooter and CGA      AM-PAC 6 CLICK MOBILITY          Treatment & Education:   Pt educated on POC, discharge recommendation, importance of time OOB, NWB on R LE, mobility optimal to maintain NWB on RLE, need for assist with mobility, use of call bell to seek assistance as needed and fall prevention       Patient left sitting edge of bed with all lines intact, call button in reach, and multiple family members present..    GOALS:   Multidisciplinary Problems       Physical Therapy Goals          Problem: Physical Therapy    Goal Priority Disciplines Outcome Goal Variances Interventions   Physical Therapy Goal     PT, PT/OT Ongoing, Progressing     Description: Goals to be met by: 24     Patient will increase functional independence with mobility by performin. Bed to chair transfer with Supervision using knee scooter  2. Gait  x 150 feet with Supervision using knee scooter    All goals while maintaining NWB on RLE                             Time Tracking:     PT Received On: 24  PT Start Time: 946     PT Stop Time: 954  PT Total Time (min): 8 min     Billable Minutes: Gait Training 8    Treatment Type: Treatment  PT/PTA: PT     Number of PTA visits since last PT visit: 0     2024

## 2024-01-31 NOTE — PLAN OF CARE
Problem: Adult Inpatient Plan of Care  Goal: Plan of Care Review  Outcome: Ongoing, Progressing  Goal: Patient-Specific Goal (Individualized)  Outcome: Ongoing, Progressing  Goal: Absence of Hospital-Acquired Illness or Injury  Outcome: Ongoing, Progressing  Goal: Optimal Comfort and Wellbeing  Outcome: Ongoing, Progressing  Goal: Readiness for Transition of Care  Outcome: Ongoing, Progressing     Problem: Diabetes Comorbidity  Goal: Blood Glucose Level Within Targeted Range  Outcome: Ongoing, Progressing     Problem: Impaired Wound Healing  Goal: Optimal Wound Healing  Outcome: Ongoing, Progressing     Problem: Oral Intake Inadequate  Goal: Improved Oral Intake  Outcome: Ongoing, Progressing

## 2024-01-31 NOTE — SUBJECTIVE & OBJECTIVE
Subjective:     Interval History: Patient resting in bed. Denies complaints of pain to either extremity. Denies any new complaints.   Wound VAC in place.  States no pain to the foot.  Awaiting LTAC placement.  Patient continues to smoke despite recommendations to stop smoking for wound healing.  Patient not interested in stopping smoking.  Additionally, patient is walking on his foot according to physical therapy, despite recommendations to stay off foot and remain nonweightbearing while healing.  Discussed with patient the need to remain compliant or he is at risk of losing his foot.    Scheduled Meds:   atorvastatin  40 mg Oral QHS    ceFEPime IV (PEDS and ADULTS)  2 g Intravenous Q8H    clopidogreL  75 mg Oral Daily    collagenase   Topical (Top) Twice Weekly    enoxparin  40 mg Subcutaneous Daily    insulin aspart U-100  11 Units Subcutaneous TIDWM    insulin detemir U-100  10 Units Subcutaneous QHS    insulin detemir U-100  33 Units Subcutaneous Daily    metroNIDAZOLE  500 mg Oral Q8H    multivitamin  1 tablet Oral Daily    vancomycin (VANCOCIN) IV (PEDS and ADULTS)  1,000 mg Intravenous Q10H     Continuous Infusions:  PRN Meds:acetaminophen, acetaminophen, dextrose 50%, dextrose 50%, glucagon (human recombinant), glucose, glucose, HYDROcodone-acetaminophen, insulin aspart U-100, loperamide, melatonin, naloxone, ondansetron, potassium bicarbonate, potassium bicarbonate, potassium bicarbonate, sodium chloride 0.9%, Pharmacy to dose Vancomycin consult **AND** vancomycin - pharmacy to dose    Review of Systems  Objective:     Vital Signs (Most Recent):  Temp: 99.4 °F (37.4 °C) (01/31/24 1222)  Pulse: 93 (01/31/24 1222)  Resp: 18 (01/31/24 1222)  BP: 135/73 (01/31/24 1222)  SpO2: 95 % (01/31/24 1222) Vital Signs (24h Range):  Temp:  [98 °F (36.7 °C)-99.4 °F (37.4 °C)] 99.4 °F (37.4 °C)  Pulse:  [] 93  Resp:  [16-18] 18  SpO2:  [94 %-96 %] 95 %  BP: (129-164)/(2-84) 135/73     Weight: 53.1 kg (117 lb 1  oz)  Body mass index is 18.33 kg/m².    Foot Exam          Laboratory:  All pertinent labs reviewed within the last 24 hours.    Diagnostic Results:  I have reviewed all pertinent imaging results/findings within the past 24 hours.

## 2024-01-31 NOTE — ASSESSMENT & PLAN NOTE
Continue Wound vac ordered to right foot, set at 175 mmhg , 2 times a week changes, deep tissue cultures taken    Non weight bearing to right foot     Waiting for LTAC following discharge for IV Abx, wound care with wound vac, and physical therapy.     Marco Antonio and protein drinks daily     Tight glucose control        Counseled patient on increasing protein intake, not getting wound wet, keeping dressing clean dry and intact, following a healthy diet, elevating legs when able, removing pressure from wound

## 2024-01-31 NOTE — PROGRESS NOTES
"[facility]  Adult Nutrition   Progress Note (Follow-Up)    SUMMARY     Recommendations  Recommendation/Intervention: 1. Continue diabetic diet as tolerated. 2. Continue Marco Antonio BID and Glucerna with meals to assist with wound healing.  Goals: 1. Intake to be > / = 50% EEN / EPN. 2. Lab values trend to target range.  Nutrition Goal Status: progressing towards goal    Nutrition Diagnosis PES Statement: Altered nutrition related lab values related to endocrine dysfunction as evidenced by uncontrolled DM 2 with glucose labs > 180 mg/dL.; Hb A1c > 15.  Continues    Dietitian Rounds Brief  Patient reports good appetite and intake. Patient ate all of lunch and drank Glucerna. Patient reports he will drink Marco Antonio in a little while. Patient updated Hb A1c > 15; may benefit from outpatient nutrition therapy. RD to follow for further education.     Social Determinants of Health: dietary non-compliance  Diet order:   Current Diet Order: diabetic 2000 kcal diet   Oral Nutrition Supplement: glucerna with meals and Marco Antonio BID to assist with wound healing.         Evaluation of Received Nutrient/Fluid Intake  Energy Calories Required: meeting needs  Protein Required: meeting needs  Fluid Required: meeting needs  Tolerance: tolerating     % Intake of Estimated Energy Needs: 75 - 100 %  % Meal Intake: 75 - 100 %      Intake/Output Summary (Last 24 hours) at 1/31/2024 1508  Last data filed at 1/31/2024 0935  Gross per 24 hour   Intake 840 ml   Output --   Net 840 ml        Anthropometrics  Temp: 99.4 °F (37.4 °C)  Height Method: Stated  Height: 5' 7" (170.2 cm)  Height (inches): 67 in  Weight Method: Bed Scale  Weight: 53.1 kg (117 lb 1 oz)  Weight (lb): 117.07 lb  Ideal Body Weight (IBW), Male: 148 lb  % Ideal Body Weight, Male (lb): 79.1 %  BMI (Calculated): 18.3  BMI Grade: 17 - 18.4 protein-energy malnutrition grade I       Estimated/Assessed Needs  Weight Used For Calorie Calculations: 53.1 kg (117 lb 1 oz)  Energy " Calorie Requirements (kcal): 8052-6021 / day (30-35 kcal/kg)  Energy Need Method: Kcal/kg  Protein Requirements: 63-80 gm/day (1.2-1.5 gm/kg)  Weight Used For Protein Calculations: 53.1 kg (117 lb 1 oz)     Estimated Fluid Requirement Method: RDA Method  RDA Method (mL): 1593  CHO Requirement: 232 kcal, 15 servings per day    Reason for Assessment  Reason For Assessment: RD follow-up  Relevant Medical History: DM 2 with A1c > 10  Interdisciplinary Rounds: did not attend  Nutrition Discharge Planning: Continue diabetic 2000 kcal diet; continue glucerna shake with meals; continue Marco Antonio 2 times / day    Nutrition/Diet History  Spiritual, Cultural Beliefs, Latter-day Practices, Values that Affect Care: no  Food Allergies: NKFA  Factors Affecting Nutritional Intake: None identified at this time    Nutrition Risk Screen  Nutrition Risk Screen: large or nonhealing wound, burn or pressure injury       Altered Skin Integrity 01/26/24 1313 Right Heel-Wound Image: Images linked       Altered Skin Integrity 01/26/24 1313 Left Finger, third-Wound Image: Images linked       Altered Skin Integrity 01/26/24 1313 Left Finger, first-Wound Image: Images linked       Altered Skin Integrity 01/26/24 1313 Right anterior Foot-Wound Image: Images linked       Altered Skin Integrity 01/26/24 1313 Left anterior Foot-Wound Image: Images linked       Altered Skin Integrity 01/26/24 1313 Left posterior Foot-Wound Image: Images linked       Altered Skin Integrity 01/26/24 1313 Left anterior Knee-Wound Image: Images linked  MST Score: 0  Have you recently lost weight without trying?: No  Weight loss score: 0  Have you been eating poorly because of a decreased appetite?: No  Appetite score: 0       Weight History:  Wt Readings from Last 5 Encounters:   01/27/24 53.1 kg (117 lb 1 oz)   01/26/24 51.7 kg (114 lb)   01/23/24 49.9 kg (110 lb)   09/07/23 52.2 kg (115 lb)   05/15/23 51.9 kg (114 lb 6.4 oz)        Lab/Procedures/Meds: Pertinent Labs/Meds  "Reviewed    Medications:Pertinent Medications Reviewed  Scheduled Meds:   atorvastatin  40 mg Oral QHS    ceFEPime IV (PEDS and ADULTS)  2 g Intravenous Q8H    clopidogreL  75 mg Oral Daily    collagenase   Topical (Top) Twice Weekly    enoxparin  40 mg Subcutaneous Daily    insulin aspart U-100  11 Units Subcutaneous TIDWM    insulin detemir U-100  10 Units Subcutaneous QHS    insulin detemir U-100  33 Units Subcutaneous Daily    metroNIDAZOLE  500 mg Oral Q8H    multivitamin  1 tablet Oral Daily    vancomycin (VANCOCIN) IV (PEDS and ADULTS)  1,000 mg Intravenous Q10H     Continuous Infusions:  PRN Meds:.acetaminophen, acetaminophen, dextrose 50%, dextrose 50%, glucagon (human recombinant), glucose, glucose, HYDROcodone-acetaminophen, insulin aspart U-100, loperamide, melatonin, naloxone, ondansetron, potassium bicarbonate, potassium bicarbonate, potassium bicarbonate, sodium chloride 0.9%, Pharmacy to dose Vancomycin consult **AND** vancomycin - pharmacy to dose    Labs: Pertinent Labs Reviewed  Clinical Chemistry:  Recent Labs   Lab 01/26/24  1032 01/27/24  0517 01/31/24  0458   *   < > 138   K 4.7   < > 3.9   CL 88*   < > 102   CO2 26   < > 27   *   < > 234*   BUN 17   < > 24*   CREATININE 0.8   < > 0.8   CALCIUM 9.4   < > 8.2*   PROT 7.7  --   --    ALBUMIN 3.6  --   --    BILITOT 0.5  --   --    ALKPHOS 139*  --   --    AST 17  --   --    ALT 12  --   --    ANIONGAP 13   < > 9   MG 2.0   < > 2.2    < > = values in this interval not displayed.     CBC:   Recent Labs   Lab 01/31/24  0458   WBC 12.06   RBC 3.51*   HGB 9.7*   HCT 30.5*      MCV 87   MCH 27.6   MCHC 31.8*     Lipid Panel:  No results for input(s): "CHOL", "HDL", "LDLCALC", "TRIG", "CHOLHDL" in the last 168 hours.  Cardiac Profile:  No results for input(s): "BNP", "CPK", "CPKMB", "TROPONINI", "CKTOTAL" in the last 168 hours.  Inflammatory Labs:  No results for input(s): "CRP" in the last 168 hours.  Diabetes:  Recent Labs   Lab " "01/28/24  0459   HGBA1C >15.0*     Thyroid & Parathyroid:  No results for input(s): "TSH", "FREET4", "X1NBJXM", "R5MDRSC", "THYROIDAB" in the last 168 hours.    Monitor and Evaluation  Food and Nutrient Intake: energy intake, food and beverage intake  Food and Nutrient Adminstration: diet order  Knowledge/Beliefs/Attitudes: food and nutrition knowledge/skill  Physical Activity and Function: nutrition-related ADLs and IADLs  Anthropometric Measurements: weight change, weight, body mass index  Biochemical Data, Medical Tests and Procedures: electrolyte and renal panel, gastrointestinal profile, glucose/endocrine profile, inflammatory profile, lipid profile  Nutrition-Focused Physical Findings: extremities, muscles and bones     Nutrition Risk  Level of Risk/Frequency of Follow-up:  (1 x / week)     Nutrition Follow-Up  RD Follow-up?: Yes      Merry Jacob RD, CHANDU 01/31/2024 3:08 PM     "

## 2024-01-31 NOTE — PROGRESS NOTES
"[facility] Medicine  Progress Note    Patient Name: Sergio Porter  MRN: [id number]  Patient Class: IP- Inpatient   Admission Date: 1/26/2024  Length of Stay: 5 days  Attending Physician: Karuna Scott MD  Primary Care Provider: Aletha Henson FNP-C        Subjective:     Principal Problem:Cellulitis        HPI:  No notes on file    Overview/Hospital Course:  No notes on file    Interval History:  Feeling well.  No pain in his foot.  Having loose stool and getting Imodium as needed.  Afebrile.    Review of Systems Complete ROS otherwise negative other than stated in HPI.   Objective:     Vital Signs (Most Recent):  Temp: 99 °F (37.2 °C) (01/31/24 0750)  Pulse: 98 (01/31/24 0750)  Resp: 18 (01/31/24 0750)  BP: (!) 154/80 (01/31/24 0750)  SpO2: 95 % (01/31/24 0750) Vital Signs (24h Range):  Temp:  [98 °F (36.7 °C)-99.4 °F (37.4 °C)] 99 °F (37.2 °C)  Pulse:  [] 98  Resp:  [16-18] 18  SpO2:  [94 %-97 %] 95 %  BP: (129-164)/(2-88) 154/80     Weight: 53.1 kg (117 lb 1 oz)  Body mass index is 18.33 kg/m².    Intake/Output Summary (Last 24 hours) at 1/31/2024 1035  Last data filed at 1/31/2024 0935  Gross per 24 hour   Intake 1200 ml   Output 400 ml   Net 800 ml         Physical Exam  GENERAL:  Alert and oriented x 3.  Thin.  No distress  HEENT:  EOMI. Conjunctivae intact.   NECK:  Supple   Cardio: Regular rate and rhythm  LUNGS:  No respiratory distress.  Clear to auscultation  ABDOMEN:  Soft,  Nontender and nondistended, bowel sounds present  EXTREMITIES:  No clubbing or cyanosis or edema.  Right foot with dressing in place        Significant Labs: All pertinent labs within the past 24 hours have been reviewed.  Blood Culture: No results for input(s): "LABBLOO" in the last 48 hours.  BMP:   Recent Labs   Lab 01/31/24  0458   *      K 3.9      CO2 27   BUN 24*   CREATININE 0.8   CALCIUM 8.2*   MG 2.2     CBC:   Recent Labs   Lab 01/30/24  0615 01/31/24  0458   WBC " 13.78* 12.06   HGB 10.0* 9.7*   HCT 30.7* 30.5*    350     CMP:   Recent Labs   Lab 01/30/24  0615 01/31/24  0458   * 138   K 3.6 3.9    102   CO2 29 27   * 234*   BUN 16 24*   CREATININE 0.6 0.8   CALCIUM 7.9* 8.2*   ANIONGAP 5* 9       Significant Imaging: I have reviewed all pertinent imaging results/findings within the past 24 hours.    Assessment/Plan:      Right foot diabetic wound with cellulitis and abscess:  Presented with puncture wound of right heel admitted for cellulitis and abscess.  No bone involvement on MRI.  He has associated PAD with ongoing tobacco use and uncontrolled diabetes.  Had angiogram with revascularization 1/27.  Have been titrating insulin for poorly controlled diabetes as below.  s/p  drainage of abscess at bedside 1/29 by Podiatry.  Continue current empiric antibiotics awaiting wound culture.  Plan for 2 weeks IV antibiotics pending culture results. Has wound VAC in place.  Leukocytosis improving, monitor.  Plan is for placement at [facility] extended care in [place] where he has been accepted for IV antibiotics, wound care, and therapy pending culture results     Tobacco use:  Counseled.  Refuses nicotine patch     Diabetes: Noncompliant with medications, poorly controlled.  A1c 15. had Diabetes education.  Still intermittently uncontrolled.  cont preprandial insulin 11 units, increase long-acting insulin to 33 units in the morning, continue 10 units at night, monitor and cover with moderate dose sliding scale.  Mix antibiotics with normal saline    VTE Risk Mitigation (From admission, onward)           Ordered     enoxaparin injection 40 mg  Daily         01/26/24 1313     IP VTE HIGH RISK PATIENT  Once         01/26/24 1313     Place sequential compression device  Until discontinued         01/26/24 1313                    Discharge Planning   MADDIE: 2/1/2024     Code Status: Full Code   Is the patient medically ready for discharge?:     Reason for patient  still in hospital (select all that apply): Patient trending condition and Laboratory test  Discharge Plan A: Long-term acute care facility (LTAC)                  Karuna Scott MD  Department of Hospital Medicine   formerly [facility]

## 2024-01-31 NOTE — SUBJECTIVE & OBJECTIVE
"Interval History:  Feeling well.  No pain in his foot.  Having loose stool and getting Imodium as needed.  Afebrile.    Review of Systems Complete ROS otherwise negative other than stated in HPI.   Objective:     Vital Signs (Most Recent):  Temp: 99 °F (37.2 °C) (01/31/24 0750)  Pulse: 98 (01/31/24 0750)  Resp: 18 (01/31/24 0750)  BP: (!) 154/80 (01/31/24 0750)  SpO2: 95 % (01/31/24 0750) Vital Signs (24h Range):  Temp:  [98 °F (36.7 °C)-99.4 °F (37.4 °C)] 99 °F (37.2 °C)  Pulse:  [] 98  Resp:  [16-18] 18  SpO2:  [94 %-97 %] 95 %  BP: (129-164)/(2-88) 154/80     Weight: 53.1 kg (117 lb 1 oz)  Body mass index is 18.33 kg/m².    Intake/Output Summary (Last 24 hours) at 1/31/2024 1035  Last data filed at 1/31/2024 0935  Gross per 24 hour   Intake 1200 ml   Output 400 ml   Net 800 ml         Physical Exam  GENERAL:  Alert and oriented x 3.  Thin.  No distress  HEENT:  EOMI. Conjunctivae intact.   NECK:  Supple   Cardio: Regular rate and rhythm  LUNGS:  No respiratory distress.  Clear to auscultation  ABDOMEN:  Soft,  Nontender and nondistended, bowel sounds present  EXTREMITIES:  No clubbing or cyanosis or edema.  Right foot with dressing in place        Significant Labs: All pertinent labs within the past 24 hours have been reviewed.  Blood Culture: No results for input(s): "LABBLOO" in the last 48 hours.  BMP:   Recent Labs   Lab 01/31/24 0458   *      K 3.9      CO2 27   BUN 24*   CREATININE 0.8   CALCIUM 8.2*   MG 2.2     CBC:   Recent Labs   Lab 01/30/24  0615 01/31/24 0458   WBC 13.78* 12.06   HGB 10.0* 9.7*   HCT 30.7* 30.5*    350     CMP:   Recent Labs   Lab 01/30/24  0615 01/31/24 0458   * 138   K 3.6 3.9    102   CO2 29 27   * 234*   BUN 16 24*   CREATININE 0.6 0.8   CALCIUM 7.9* 8.2*   ANIONGAP 5* 9       Significant Imaging: I have reviewed all pertinent imaging results/findings within the past 24 hours.  "

## 2024-01-31 NOTE — PROGRESS NOTES
[facility]  Podiatry  Progress Note    Patient Name: Sergio Porter  MRN: [id number]  Admission Date: 1/26/2024  Hospital Length of Stay: 5 days  Attending Physician: Karuna Scott MD  Primary Care Provider: Aletha Henson FNP-C     Subjective:     Interval History: Patient resting in bed. Denies complaints of pain to either extremity. Denies any new complaints.   Wound VAC in place.  States no pain to the foot.  Awaiting LTAC placement.  Patient continues to smoke despite recommendations to stop smoking for wound healing.  Patient not interested in stopping smoking.  Additionally, patient is walking on his foot according to physical therapy, despite recommendations to stay off foot and remain nonweightbearing while healing.  Discussed with patient the need to remain compliant or he is at risk of losing his foot.    Scheduled Meds:   atorvastatin  40 mg Oral QHS    ceFEPime IV (PEDS and ADULTS)  2 g Intravenous Q8H    clopidogreL  75 mg Oral Daily    collagenase   Topical (Top) Twice Weekly    enoxparin  40 mg Subcutaneous Daily    insulin aspart U-100  11 Units Subcutaneous TIDWM    insulin detemir U-100  10 Units Subcutaneous QHS    insulin detemir U-100  33 Units Subcutaneous Daily    metroNIDAZOLE  500 mg Oral Q8H    multivitamin  1 tablet Oral Daily    vancomycin (VANCOCIN) IV (PEDS and ADULTS)  1,000 mg Intravenous Q10H     Continuous Infusions:  PRN Meds:acetaminophen, acetaminophen, dextrose 50%, dextrose 50%, glucagon (human recombinant), glucose, glucose, HYDROcodone-acetaminophen, insulin aspart U-100, loperamide, melatonin, naloxone, ondansetron, potassium bicarbonate, potassium bicarbonate, potassium bicarbonate, sodium chloride 0.9%, Pharmacy to dose Vancomycin consult **AND** vancomycin - pharmacy to dose    Review of Systems  Objective:     Vital Signs (Most Recent):  Temp: 99.4 °F (37.4 °C) (01/31/24 1222)  Pulse: 93 (01/31/24 1222)  Resp: 18 (01/31/24 1222)  BP: 135/73 (01/31/24  1222)  SpO2: 95 % (01/31/24 1222) Vital Signs (24h Range):  Temp:  [98 °F (36.7 °C)-99.4 °F (37.4 °C)] 99.4 °F (37.4 °C)  Pulse:  [] 93  Resp:  [16-18] 18  SpO2:  [94 %-96 %] 95 %  BP: (129-164)/(2-84) 135/73     Weight: 53.1 kg (117 lb 1 oz)  Body mass index is 18.33 kg/m².    Foot Exam          Laboratory:  All pertinent labs reviewed within the last 24 hours.    Diagnostic Results:  I have reviewed all pertinent imaging results/findings within the past 24 hours.  Assessment/Plan:     ID  Abscess of right foot  Continue Wound vac ordered to right foot, set at 175 mmhg , 2 times a week changes, deep tissue cultures taken    Non weight bearing to right foot     Waiting for LTAC following discharge for IV Abx, wound care with wound vac, and physical therapy.     Marco Antonio and protein drinks daily     Tight glucose control        Counseled patient on increasing protein intake, not getting wound wet, keeping dressing clean dry and intact, following a healthy diet, elevating legs when able, removing pressure from wound        Linda Solomon DPM  Podiatry  [facility]

## 2024-01-31 NOTE — PROGRESS NOTES
VANCOMYCIN PHARMACOKINETIC NOTE:  Vancomycin Day #6    Objective:    48 y.o., male, Actual Body Weight = 53.1 kg (117 lb 1 oz)    Diagnosis/Indication for Vancomycin:  Skin & Soft Tissue Infection   Desired Vancomycin Peak:  30-35 mcg/ml; Desired Trough: 10-15 mcg/ml    Current Vancomycin Regimen:  1000 mg IV every Q12H hours    Current Dosing History   Day of Thx Date Current Weight (kg) Laboratory  Doses    Vancomycin Level      Time SCr CrCl Scheduled Time Time Dose Dosage (mcg/ml) Peak,  Random,  Trough?   1 1/26 53.1 07:40 0.8 84.8 VANC 1250MG LD, then 750mg Q12H          13:30 15:03 1 1250 - -   2 1/27 53.1 05:17 0.8 84.8 01:30 01:59 2 750 - -         13:30 14:08 3 750 - -   3 1/28 53.1 04:59 0.7 96.9 00:30 00:28 - - 4.4 Trough         03:00 03:25 4 750           14:00 14:39 - - 5.7 Trough         Change VANCOMYCIN to 1000 mg Q12H         15:45 15:32 5 1000     4 1/29 53.1 05:52 0.7 96.9 03:45 03:50 6 1000           14:45 13:49 - - 47.9 Trough         NG 14:45 1000     5 1/30 53.1 06:15 0.6 113.1 AM 06:15 - - 5.6 Random         Change VANCOMYCIN to 1000 MG Q 8 HOURS          08:00 10:01 7 1000  -  -         18:00 18:29 8 1000  -  -   6 1/31 53.1 04:58 0.8 84.8 02:00 02:13 9 1000  -  -         09:00 08:40  -  - 16.4 TROUGH     Receiving other antibiotics:    Antibiotics (From admission, onward)      Start     Stop Route Frequency Ordered    01/31/24 1030  vancomycin in dextrose 5 % 1 gram/250 mL IVPB 1,000 mg         -- IV Every 10 hours 01/31/24 1008    01/30/24 1400  metroNIDAZOLE tablet 500 mg         -- Oral Every 8 hours 01/30/24 1001    01/30/24 1300  ceFEPIme (MAXIPIME) 2 g in sodium chloride 0.9% 100 mL IVPB         -- IV Every 8 hours (non-standard times) 01/30/24 1028    01/26/24 1244  vancomycin - pharmacy to dose  (vancomycin IVPB (PEDS and ADULTS))        See Hyperspace for full Linked Orders Report.    -- IV pharmacy to manage frequency 01/26/24 1144             The patient has the following  labs:     1/31/2024 Estimated Creatinine Clearance: 84.8 mL/min (based on SCr of 0.8 mg/dL). Lab Results   Component Value Date    BUN 24 (H) 01/31/2024     Lab Results   Component Value Date    WBC 12.06 01/31/2024          Vancomycin Trough:  16.4 mcg/mL collected 8 hours after Dose #9 (drawn correctly.      Microbiology Results (last 7 days)       Procedure Component Value Units Date/Time    Blood culture x two cultures. Draw prior to antibiotics. [[id number]] Collected: 01/26/24 1041    Order Status: Completed Specimen: Blood Updated: 01/30/24 1232     Blood Culture, Routine No Growth to date      No Growth to date      No Growth to date      No Growth to date      No Growth to date    Narrative:      Aerobic and anaerobic    Blood culture x two cultures. Draw prior to antibiotics. [[id number]] Collected: 01/26/24 1049    Order Status: Completed Specimen: Blood from Antecubital, Left Updated: 01/30/24 1232     Blood Culture, Routine No Growth to date      No Growth to date      No Growth to date      No Growth to date      No Growth to date    Narrative:      Aerobic and anaerobic    Aerobic culture [[id number]] Collected: 01/29/24 1356    Order Status: Completed Specimen: Abscess from Foot, Right Updated: 01/30/24 0746     Aerobic Bacterial Culture Insufficient incubation, culture in progress    Culture, Anaerobic [[id number]] Collected: 01/29/24 1356    Order Status: Sent Specimen: Abscess from Foot, Right Updated: 01/29/24 1403             Assessment:  Vancomycin dose =  18 mg/kg  Renal function is: stable.  Vancomycin trough is above goal range.    Plan:  Will change vancomycin to 1000 mg IV every 10 hours.  Vancomycin dose = 18 mg/kg actual body weight.  Will schedule next dose for 1/31/24 at 1030, approximately 10 hours since last dose  Will obtain vancomycin trough after 3 more dose(s), prior to dose due 2/1/24 at 0630    Pharmacy will continue to manage vancomycin therapy and adjust regimen as  necessary.    Thank you for allowing us to participate in this patient's care.     Davina Thapa 1/31/2024 10:09 AM  Department of Pharmacy  Ext [phone]

## 2024-02-01 VITALS
HEIGHT: 67 IN | WEIGHT: 117.06 LBS | SYSTOLIC BLOOD PRESSURE: 112 MMHG | HEART RATE: 95 BPM | OXYGEN SATURATION: 99 % | BODY MASS INDEX: 18.37 KG/M2 | TEMPERATURE: 98 F | RESPIRATION RATE: 17 BRPM | DIASTOLIC BLOOD PRESSURE: 70 MMHG

## 2024-02-01 LAB
ANION GAP SERPL CALC-SCNC: 8 MMOL/L (ref 8–16)
BACTERIA SPEC AEROBE CULT: ABNORMAL
BACTERIA SPEC ANAEROBE CULT: NORMAL
BUN SERPL-MCNC: 27 MG/DL (ref 6–20)
CALCIUM SERPL-MCNC: 8.7 MG/DL (ref 8.7–10.5)
CHLORIDE SERPL-SCNC: 102 MMOL/L (ref 95–110)
CO2 SERPL-SCNC: 25 MMOL/L (ref 23–29)
CREAT SERPL-MCNC: 0.7 MG/DL (ref 0.5–1.4)
ERYTHROCYTE [DISTWIDTH] IN BLOOD BY AUTOMATED COUNT: 13.2 % (ref 11.5–14.5)
EST. GFR  (NO RACE VARIABLE): >60 ML/MIN/1.73 M^2
GLUCOSE SERPL-MCNC: 217 MG/DL (ref 70–110)
GLUCOSE SERPL-MCNC: 282 MG/DL (ref 70–110)
GLUCOSE SERPL-MCNC: 303 MG/DL (ref 70–110)
HCT VFR BLD AUTO: 34.1 % (ref 40–54)
HGB BLD-MCNC: 10.9 G/DL (ref 14–18)
MAGNESIUM SERPL-MCNC: 2.2 MG/DL (ref 1.6–2.6)
MCH RBC QN AUTO: 27.5 PG (ref 27–31)
MCHC RBC AUTO-ENTMCNC: 32 G/DL (ref 32–36)
MCV RBC AUTO: 86 FL (ref 82–98)
PLATELET # BLD AUTO: 387 K/UL (ref 150–450)
PMV BLD AUTO: 8 FL (ref 9.2–12.9)
POTASSIUM SERPL-SCNC: 4.6 MMOL/L (ref 3.5–5.1)
RBC # BLD AUTO: 3.97 M/UL (ref 4.6–6.2)
SODIUM SERPL-SCNC: 135 MMOL/L (ref 136–145)
VANCOMYCIN TROUGH SERPL-MCNC: 16.2 UG/ML
WBC # BLD AUTO: 12.93 K/UL (ref 3.9–12.7)

## 2024-02-01 PROCEDURE — 63600175 PHARM REV CODE 636 W HCPCS: Performed by: HOSPITALIST

## 2024-02-01 PROCEDURE — 83735 ASSAY OF MAGNESIUM: CPT | Performed by: INTERNAL MEDICINE

## 2024-02-01 PROCEDURE — 36415 COLL VENOUS BLD VENIPUNCTURE: CPT | Performed by: HOSPITALIST

## 2024-02-01 PROCEDURE — 25000003 PHARM REV CODE 250: Performed by: STUDENT IN AN ORGANIZED HEALTH CARE EDUCATION/TRAINING PROGRAM

## 2024-02-01 PROCEDURE — 80202 ASSAY OF VANCOMYCIN: CPT | Performed by: HOSPITALIST

## 2024-02-01 PROCEDURE — 25000003 PHARM REV CODE 250: Performed by: HOSPITALIST

## 2024-02-01 PROCEDURE — 97116 GAIT TRAINING THERAPY: CPT | Mod: CQ

## 2024-02-01 PROCEDURE — 97605 NEG PRS WND THER DME<=50SQCM: CPT

## 2024-02-01 PROCEDURE — 25000003 PHARM REV CODE 250: Performed by: SURGERY

## 2024-02-01 PROCEDURE — 25000003 PHARM REV CODE 250: Performed by: INTERNAL MEDICINE

## 2024-02-01 PROCEDURE — 80048 BASIC METABOLIC PNL TOTAL CA: CPT | Performed by: INTERNAL MEDICINE

## 2024-02-01 PROCEDURE — 85027 COMPLETE CBC AUTOMATED: CPT | Performed by: INTERNAL MEDICINE

## 2024-02-01 PROCEDURE — 63600175 PHARM REV CODE 636 W HCPCS: Performed by: INTERNAL MEDICINE

## 2024-02-01 RX ORDER — INSULIN ASPART 100 [IU]/ML
11 INJECTION, SOLUTION INTRAVENOUS; SUBCUTANEOUS 3 TIMES DAILY
Qty: 9.9 ML | Refills: 0 | Status: SHIPPED | OUTPATIENT
Start: 2024-02-01 | End: 2024-02-07 | Stop reason: SDUPTHER

## 2024-02-01 RX ORDER — IBUPROFEN 200 MG
1 TABLET ORAL DAILY
Qty: 14 PATCH | Refills: 0 | Status: SHIPPED | OUTPATIENT
Start: 2024-02-01

## 2024-02-01 RX ORDER — VANCOMYCIN HCL IN 5 % DEXTROSE 1G/250ML
1000 PLASTIC BAG, INJECTION (ML) INTRAVENOUS
Status: DISCONTINUED | OUTPATIENT
Start: 2024-02-01 | End: 2024-02-01 | Stop reason: HOSPADM

## 2024-02-01 RX ORDER — HYDROCODONE BITARTRATE AND ACETAMINOPHEN 5; 325 MG/1; MG/1
1 TABLET ORAL EVERY 6 HOURS PRN
Qty: 12 TABLET | Refills: 0 | Status: SHIPPED | OUTPATIENT
Start: 2024-02-01 | End: 2024-03-07 | Stop reason: ALTCHOICE

## 2024-02-01 RX ORDER — CEFTRIAXONE 1 G/1
1 INJECTION, POWDER, FOR SOLUTION INTRAMUSCULAR; INTRAVENOUS DAILY
Qty: 11 G | Refills: 0
Start: 2024-02-01 | End: 2024-02-12

## 2024-02-01 RX ORDER — CLOPIDOGREL BISULFATE 75 MG/1
75 TABLET ORAL DAILY
Qty: 30 TABLET | Refills: 0 | Status: SHIPPED | OUTPATIENT
Start: 2024-02-02 | End: 2024-05-09 | Stop reason: SDUPTHER

## 2024-02-01 RX ORDER — METRONIDAZOLE 500 MG/1
500 TABLET ORAL EVERY 8 HOURS
Qty: 33 TABLET | Refills: 0 | Status: SHIPPED | OUTPATIENT
Start: 2024-02-01 | End: 2024-02-12

## 2024-02-01 RX ADMIN — MULTIVITAMIN TABLET 1 TABLET: TABLET at 08:02

## 2024-02-01 RX ADMIN — LOPERAMIDE HYDROCHLORIDE 2 MG: 2 CAPSULE ORAL at 12:02

## 2024-02-01 RX ADMIN — INSULIN ASPART 4 UNITS: 100 INJECTION, SOLUTION INTRAVENOUS; SUBCUTANEOUS at 12:02

## 2024-02-01 RX ADMIN — LOPERAMIDE HYDROCHLORIDE 2 MG: 2 CAPSULE ORAL at 06:02

## 2024-02-01 RX ADMIN — CLOPIDOGREL BISULFATE 75 MG: 75 TABLET, FILM COATED ORAL at 08:02

## 2024-02-01 RX ADMIN — METRONIDAZOLE 500 MG: 250 TABLET ORAL at 03:02

## 2024-02-01 RX ADMIN — CEFEPIME 2 G: 2 INJECTION, POWDER, FOR SOLUTION INTRAVENOUS at 12:02

## 2024-02-01 RX ADMIN — CEFEPIME 2 G: 2 INJECTION, POWDER, FOR SOLUTION INTRAVENOUS at 05:02

## 2024-02-01 RX ADMIN — INSULIN ASPART 11 UNITS: 100 INJECTION, SOLUTION INTRAVENOUS; SUBCUTANEOUS at 08:02

## 2024-02-01 RX ADMIN — CEFTRIAXONE SODIUM 1 G: 1 INJECTION, POWDER, FOR SOLUTION INTRAMUSCULAR; INTRAVENOUS at 03:02

## 2024-02-01 RX ADMIN — VANCOMYCIN HYDROCHLORIDE 1000 MG: 1 INJECTION, POWDER, LYOPHILIZED, FOR SOLUTION INTRAVENOUS at 06:02

## 2024-02-01 RX ADMIN — INSULIN ASPART 11 UNITS: 100 INJECTION, SOLUTION INTRAVENOUS; SUBCUTANEOUS at 12:02

## 2024-02-01 RX ADMIN — METRONIDAZOLE 500 MG: 250 TABLET ORAL at 05:02

## 2024-02-01 NOTE — PLAN OF CARE
Patient's discharge plan has changed from LTAC to home health at his dad's house in [place], [place].      Per Frances with [facility] ([phone]), home health agencies the patient can use are [facility] or [facility].  Referrals are being sent in [facility].      Per Eli with [facility], they are able to take this patient's insurance.  Orders are being sent in [facility].      Plan is for wound care to be done at [facility] in [place].    Pending wound vac for home at this time.     1331- Per [facility], [facility] and [facility] are both at their quota with patient's insurance.  CM called Frances with [facility] to see how we can get this patient home health. Left message and awaiting call back at this time.     Per wound care, wound vac was approved and is pending delivery to patient. Patient will now be receiving wound care at [facility] due to discharging to his home now and not his dad's house.     CM following.        02/01/24 1218   Post-Acute Status   Post-Acute Authorization Home Health   Discharge Plan   Discharge Plan A Home Health   Discharge Plan B Long-term acute care facility (LTAC)

## 2024-02-01 NOTE — NURSING
Discharge instruction reviewed and given to patient. Verbalized understanding. IV removed without difficulty, catheter intact. Midline left in. Patient discharged off the unit in stable condition with wound vac.

## 2024-02-01 NOTE — PLAN OF CARE
Problem: Adult Inpatient Plan of Care  Goal: Plan of Care Review  Outcome: Met  Goal: Patient-Specific Goal (Individualized)  Outcome: Met  Goal: Absence of Hospital-Acquired Illness or Injury  Outcome: Met  Goal: Optimal Comfort and Wellbeing  Outcome: Met  Goal: Readiness for Transition of Care  Outcome: Met     Problem: Diabetes Comorbidity  Goal: Blood Glucose Level Within Targeted Range  Outcome: Met     Problem: Impaired Wound Healing  Goal: Optimal Wound Healing  Outcome: Met     Problem: Oral Intake Inadequate  Goal: Improved Oral Intake  Outcome: Met     Problem: Infection  Goal: Absence of Infection Signs and Symptoms  Outcome: Met

## 2024-02-01 NOTE — HOSPITAL COURSE
Patient presented with puncture wound of right heel admitted for cellulitis and abscess.  No bone involvement on MRI.  He has associated PAD with ongoing tobacco use and uncontrolled diabetes.  Had angiogram with revascularization 1/27.  Have been titrating insulin for poorly controlled diabetes as below.  s/p  drainage of abscess at bedside 1/29 by Podiatry.  Continue current empiric antibiotics awaiting wound culture.  Plan for 2 weeks IV antibiotics pending culture results. Has wound VAC in place.  Leukocytosis improving, monitor. Tissue cultures growing group B strep. Changed antibiotics to Rocephin for once daily dosing with home health . Vancomycin was stopped. Started Plavix after revascularization. Patient was Noncompliant with medications, poorly controlled diabetes with  A1c 15. had Diabetes education.  cont preprandial insulin 11 units, increase long-acting insulin to 33 units in the morning, continue 10 units at night. Attempted to place patient to complete antibiotics but no accepting facility close by, and patient declined to move too far from home. Patient decided to go home with home health and IV Rocephin arranged for home.

## 2024-02-01 NOTE — CONSULTS
Right plantar foot wound with large amount periwound maceration, wound bed with 20% yellow slough, 10% black gray necrotic tissue, soft boggy pale pink wound bed.  Unable to express drainage from wound.  Periwound redness. iinstructed patient to stay off foot as much as possible.  Notified Dr. Solomon of above, Paint periwound with gentian violet, RAKESH wound vac here for patient to go home.  Applied using 1 piece of black sponge in wound bed and 1 piece for tracking medial to dorsum foot. Elevated on pillow instructed to call [facility] if problems with wound vac, to [facility] for dressing changes.
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
## 2024-02-01 NOTE — PLAN OF CARE
DC orders and chart reviewed. No discharge needs noted.  Patient cleared for discharge from .    Midline placed.  Wound vac for home approved and will be delivered at about 4:30.  IV antibiotics with [facility] approved and teaching completed.  Labs and line care will be done outpatient at [facility] (2/8 @ 10am).  Wound care is set up with [facility] and first appointment is scheduled for Monday @ 1:30.    Follow up information added to AVS.        02/01/24 9943   Final Note   Assessment Type Final Discharge Note   Anticipated Discharge Disposition Home   What phone number can be called within the next 1-3 days to see how you are doing after discharge? [phone]   Hospital Resources/Appts/Education Provided Appointments scheduled and added to AVS;Post-Acute resouces added to AVS   Post-Acute Status   Post-Acute Authorization IV Infusion   IV Infusion Status Set-up Complete/Auth obtained   Discharge Delays None known at this time

## 2024-02-01 NOTE — PLAN OF CARE
Problem: Adult Inpatient Plan of Care  Goal: Absence of Hospital-Acquired Illness or Injury  Outcome: Ongoing, Progressing     Problem: Diabetes Comorbidity  Goal: Blood Glucose Level Within Targeted Range  Outcome: Ongoing, Progressing     Problem: Impaired Wound Healing  Goal: Optimal Wound Healing  Outcome: Ongoing, Progressing     Problem: Oral Intake Inadequate  Goal: Improved Oral Intake  Outcome: Ongoing, Progressing

## 2024-02-01 NOTE — PLAN OF CARE
Met with pt regarding outpt wound care.  Pt would like to have wound done at previous wound care clinic, [facility].     Faxed clinicals and order to [facility] [place] [phone], will call later to schedule appt.    2:33pm - [facility] spoke to Thelma outpt wound care appt for Monday, 2/5 at 830am, on AVS    2:50pm - Called [facility] , spoke to Tammi stack, alaina labs and PICC appt Thurs, 2/8 at 10am, at [facility], on AVS    Per [facility] [place] CM wound vac to be delivered at 430pm

## 2024-02-01 NOTE — PLAN OF CARE
Problem: Physical Therapy  Goal: Physical Therapy Goal  Description: Goals to be met by: 24     Patient will increase functional independence with mobility by performin. Bed to chair transfer with Supervision using knee scooter  2. Gait  x 150 feet with Supervision using knee scooter    All goals while maintaining NWB on RLE       2024 1301 by Jennifer Eubanks, RK  Outcome: Met

## 2024-02-01 NOTE — PROGRESS NOTES
VANCOMYCIN PHARMACOKINETIC NOTE:  Vancomycin Day #7    Objective:    48 y.o., male, Actual Body Weight = 53.1 kg (117 lb 1 oz)    Diagnosis/Indication for Vancomycin:  Skin & Soft Tissue Infection   Desired Vancomycin Peak:  30-35 mcg/ml; Desired Trough: 10-15 mcg/ml    Current Vancomycin Regimen:  1000 mg IV every 10 hours    Current Dosing History   Day of Thx Date Current Weight (kg) Laboratory  Doses    Vancomycin Level      Time SCr CrCl Scheduled Time Time Dose Dosage (mcg/ml) Peak,  Random,  Trough?   1 1/26 53.1 07:40 0.8 84.8 VANC 1250MG LD, then 750mg Q12H          13:30 15:03 1 1250 - -   2 1/27 53.1 05:17 0.8 84.8 01:30 01:59 2 750 - -         13:30 14:08 3 750 - -   3 1/28 53.1 04:59 0.7 96.9 00:30 00:28 - - 4.4 Trough         03:00 03:25 4 750           14:00 14:39 - - 5.7 Trough         Change VANCOMYCIN to 1000 mg Q12H         15:45 15:32 5 1000     4 1/29 53.1 05:52 0.7 96.9 03:45 03:50 6 1000           14:45 13:49 - - 47.9 Trough         NG 14:45 1000     5 1/30 53.1 06:15 0.6 113.1 AM 06:15 - - 5.6 Random         Change VANCOMYCIN to 1000 MG Q 8 HOURS          08:00 10:01 7 1000  -  -         18:00 18:29 8 1000  -  -   6 1/31 53.1 04:58 0.8 84.8 02:00 02:13 9 1000  -  -         09:00 08:40  -  - 16.4 TROUGH         Change VANCOMYCIN to 1000 mg Q10H         10:30 11:16 10 1000           20:30 21:12 11 1000     7 2/1 53.1 05:59 0.7 96.9 05:30 05:59 - - 16.2 Trough         06:30 06:25 12 1000       Receiving other antibiotics:    Antibiotics (From admission, onward)      Start     Stop Route Frequency Ordered    02/01/24 1800  vancomycin in dextrose 5 % 1 gram/250 mL IVPB 1,000 mg         -- IV Every 12 hours (non-standard times) 02/01/24 0740    01/30/24 1400  metroNIDAZOLE tablet 500 mg         -- Oral Every 8 hours 01/30/24 1001    01/30/24 1300  ceFEPIme (MAXIPIME) 2 g in sodium chloride 0.9% 100 mL IVPB         -- IV Every 8 hours (non-standard times) 01/30/24 1028    01/26/24 1244  vancomycin -  pharmacy to dose  (vancomycin IVPB (PEDS and ADULTS))        See [facility] for full Linked Orders Report.    -- IV pharmacy to manage frequency 01/26/24 1144             The patient has the following labs:     2/1/2024 Estimated Creatinine Clearance: 96.9 mL/min (based on SCr of 0.7 mg/dL). Lab Results   Component Value Date    BUN 27 (H) 02/01/2024     Lab Results   Component Value Date    WBC 12.93 (H) 02/01/2024        Vancomycin Trough:  16.2 mcg/mL collected 9 hours after Dose #11 (drawn correctly).    Microbiology Results (last 7 days)       Procedure Component Value Units Date/Time    Aerobic culture [[id number]]  (Abnormal) Collected: 01/29/24 1356    Order Status: Completed Specimen: Abscess from Foot, Right Updated: 01/31/24 1532     Aerobic Bacterial Culture STREPTOCOCCUS AGALACTIAE (GROUP B)  Moderate  Beta-hemolytic streptococci are routinely susceptible to   penicillins,cephalosporins and carbapenems.      Blood culture x two cultures. Draw prior to antibiotics. [[id number]] Collected: 01/26/24 1041    Order Status: Completed Specimen: Blood Updated: 01/31/24 1232     Blood Culture, Routine No growth after 5 days.    Narrative:      Aerobic and anaerobic    Blood culture x two cultures. Draw prior to antibiotics. [[id number]] Collected: 01/26/24 1049    Order Status: Completed Specimen: Blood from Antecubital, Left Updated: 01/31/24 1232     Blood Culture, Routine No growth after 5 days.    Narrative:      Aerobic and anaerobic    Culture, Anaerobic [[id number]] Collected: 01/29/24 1356    Order Status: Sent Specimen: Abscess from Foot, Right Updated: 01/29/24 1403             Assessment:  Vancomycin dose =  18.9 mg/kg  Renal function is: stable.  Vancomycin trough is above goal range.    Plan:  Will change vancomycin to 1000 mg IV every 12 hours.  Vancomycin dose = 18.9 mg/kg actual body weight.  Will schedule next dose for 2/1/24 at 1800, approximately 12 hours since last dose  Will obtain  vancomycin trough after 3 more dose(s), prior to dose due 2/2/24 at 1800    Pharmacy will continue to manage vancomycin therapy and adjust regimen as necessary.    Thank you for allowing us to participate in this patient's care.     Davina Thapa 2/1/2024 7:45 AM  Department of Pharmacy  Ext [phone]

## 2024-02-01 NOTE — PT/OT/SLP PROGRESS
Physical Therapy Treatment    Patient Name:  Sergio Porter   MRN:  [id number]    Recommendations:     Discharge Recommendations: Low Intensity Therapy  Discharge Equipment Recommendations: other (see comments) (knee scooter)  Barriers to discharge:  poor compliance with NWB RLE    Assessment:     Sergio Porter is a 48 y.o. male admitted with a medical diagnosis of Cellulitis.  He presents with the following impairments/functional limitations: weakness, impaired endurance, impaired self care skills, impaired functional mobility, gait instability, impaired balance, decreased lower extremity function, decreased safety awareness, pain, impaired cardiopulmonary response to activity.    Pt sitting in wheelchair and agreeable to visit. Pt required supervision for transfer from wheelchair to knee scooter with verbal cuing for NWB RLE with difficulty maintaining.    Pt ambulated using knee scooter with excellent form and balance with pt able to back up and turn.    Pt returned to wheelchair at end of session.    Rehab Prognosis: Fair; patient would benefit from acute skilled PT services to address these deficits and reach maximum level of function.    Recent Surgery: Procedure(s) (LRB):  ANGIOGRAM, EXTREMITY, BILATERAL (Right)  Angiogram, Abdominal Aorta (N/A)  Intravascular Ultrasound, Non-Coronary (N/A)  Stents, Iliac, Bilateral (Bilateral) 5 Days Post-Op    Plan:     During this hospitalization, patient to be seen 3 x/week to address the identified rehab impairments via gait training, therapeutic activities, therapeutic exercises, neuromuscular re-education and progress toward the following goals:    Plan of Care Expires:  02/29/24    Subjective     Chief Complaint: pt reports back pain from being in bed too long  Patient/Family Comments/goals: to get better  Pain/Comfort:  Pain Rating 1: other (see comments) (not rated)  Location - Orientation 1: generalized  Location 1: back  Pain Addressed 1: Reposition,  Distraction  Pain Rating Post-Intervention 1: other (see comments) (not rated)      Objective:     Communicated with RN prior to session.  Patient found  up in wheelchair  with peripheral IV, telemetry, wound vac upon PT entry to room.     General Precautions: Standard, fall  Orthopedic Precautions: RLE non weight bearing  Braces: N/A  Respiratory Status: Room air     Functional Mobility:  Transfers:     Sit to Stand:  supervision with verbal cuing for NWB RLE with knee scooter  Gait: x 200' with knee scooter and CGA-close SBA      AM-PAC 6 CLICK MOBILITY          Treatment & Education:  Pt educated on importance of time OOB, importance of intermittent mobility, safe techniques for transfers/ambulation, discharge recommendations/options, and use of call light for assistance and fall prevention.      Patient left  up in wheelchair  with all lines intact and call button in reach..    GOALS:   Multidisciplinary Problems       Physical Therapy Goals          Problem: Physical Therapy    Goal Priority Disciplines Outcome Goal Variances Interventions   Physical Therapy Goal     PT, PT/OT Ongoing, Progressing     Description: Goals to be met by: 24     Patient will increase functional independence with mobility by performin. Bed to chair transfer with Supervision using knee scooter  2. Gait  x 150 feet with Supervision using knee scooter    All goals while maintaining NWB on RLE                             Time Tracking:     PT Received On: 24  PT Start Time: 1121     PT Stop Time: 1130  PT Total Time (min): 9 min     Billable Minutes: Gait Training 9    Treatment Type: Treatment  PT/PTA: PTA     Number of PTA visits since last PT visit: 2024

## 2024-02-01 NOTE — PROCEDURES
18 Gx 10cm PowerGlide Midline placed to pts LEFT basilic vein with the use of ultrasound guidance.    Ultrasound guidance: yes  Vessel Caliber: large and patent, compressibility normal  Needle advanced into vessel with real time Ultrasound guidance.  Guidewire confirmed in vessel.  Sterile sheath used.  Sterile dressing applied  dressing dated   Education provided to patient re: proper maintenance of line- pt verbalized understanding

## 2024-02-02 ENCOUNTER — PATIENT OUTREACH (OUTPATIENT)
Dept: FAMILY MEDICINE | Facility: CLINIC | Age: 49
End: 2024-02-02
Payer: MEDICAID

## 2024-02-02 RX ORDER — SODIUM CHLORIDE 0.9 % (FLUSH) 0.9 %
10 SYRINGE (ML) INJECTION
Status: CANCELLED | OUTPATIENT
Start: 2024-02-08

## 2024-02-02 RX ORDER — HEPARIN 100 UNIT/ML
500 SYRINGE INTRAVENOUS
Status: CANCELLED | OUTPATIENT
Start: 2024-02-08

## 2024-02-02 NOTE — TELEPHONE ENCOUNTER
Discharge Information     Discharge Date:   02/01/2024    Primary Discharge Diagnosis:  Right foot cellulitis      Discharge Summary:  Reviewed      Medication & Order Review     Were medication changes made or new medications added?   Yes    If so, has the patient filled the prescriptions?  Yes     Was Home Health ordered? Yes    If so, has Home Health contacted patient and/or initiated services? Pt states H/H referral placed but no scheduled    Name of Home Health Agency?  Pt unaware of H/H company  but listed a new number for contact.     Durable Medical Equipment ordered?  Yes     If so, has the DME provider contacted patient and delivered equipment?   Wound vac pump delivered.     Follow Up               Any problems since discharge? No    How is the patient feeling since returning home?  Pt states no c/o since discharge.    Have you set up recommended follow up appointments?  (cardiology, surgery, etc.) Appt scheduled with Wound Care on 02/05/2024 at 8:30 and Infusion 02/08/2024 for Picc. Pt aware of appt dates and times. KM    Schedule Hospital Follow-up appointment within 7-14 days (preferably 7).  02/07/2024 at 1:00    Notes:  Per pt, Since d/c he is well with no abnormal signs or symptoms. Pt states he is tolerating wound vac well no no c/o. Pt adv H/H has reached out but no initial appt scheduled. Pt adv and aware of scheduled appt with [facility] on 02/05/2024, Infusion [facility] on 02/08/2024 and PCP on 02/07/2024. Pt also adv, PCP will not remove picc line and Infusion Lab will contact him regard further assisting removal. I spoke with Eli with Infusion Lab to adv, Mrs. Pompa do not provide services of picc line removal but HFU will be scheduled with notice of any abnormal lab attention. Pt instructed to contact clinic with any further questions or concerns and proceed to the nearest ED with any abnormal signs or symptoms.             Carmen Morocho

## 2024-02-05 LAB — HBA1C MFR BLD: NORMAL %

## 2024-02-07 ENCOUNTER — OFFICE VISIT (OUTPATIENT)
Dept: FAMILY MEDICINE | Facility: CLINIC | Age: 49
End: 2024-02-07
Payer: MEDICAID

## 2024-02-07 ENCOUNTER — TELEPHONE (OUTPATIENT)
Dept: DIABETES | Facility: CLINIC | Age: 49
End: 2024-02-07
Payer: MEDICAID

## 2024-02-07 VITALS
WEIGHT: 117.38 LBS | RESPIRATION RATE: 20 BRPM | DIASTOLIC BLOOD PRESSURE: 86 MMHG | SYSTOLIC BLOOD PRESSURE: 148 MMHG | BODY MASS INDEX: 18.42 KG/M2 | HEART RATE: 96 BPM | TEMPERATURE: 98 F | HEIGHT: 67 IN

## 2024-02-07 DIAGNOSIS — Z09 HOSPITAL DISCHARGE FOLLOW-UP: Primary | ICD-10-CM

## 2024-02-07 DIAGNOSIS — Z91.199 MEDICAL NON-COMPLIANCE: ICD-10-CM

## 2024-02-07 DIAGNOSIS — E11.65 UNCONTROLLED TYPE 2 DIABETES MELLITUS WITH HYPERGLYCEMIA: ICD-10-CM

## 2024-02-07 PROCEDURE — 99215 OFFICE O/P EST HI 40 MIN: CPT | Mod: PBBFAC,PN | Performed by: NURSE PRACTITIONER

## 2024-02-07 PROCEDURE — 1159F MED LIST DOCD IN RCRD: CPT | Mod: CPTII,,, | Performed by: NURSE PRACTITIONER

## 2024-02-07 PROCEDURE — 99495 TRANSJ CARE MGMT MOD F2F 14D: CPT | Mod: S$PBB,,, | Performed by: NURSE PRACTITIONER

## 2024-02-07 PROCEDURE — 1111F DSCHRG MED/CURRENT MED MERGE: CPT | Mod: CPTII,,, | Performed by: NURSE PRACTITIONER

## 2024-02-07 PROCEDURE — 3077F SYST BP >= 140 MM HG: CPT | Mod: CPTII,,, | Performed by: NURSE PRACTITIONER

## 2024-02-07 PROCEDURE — 99999 PR PBB SHADOW E&M-EST. PATIENT-LVL V: CPT | Mod: PBBFAC,,, | Performed by: NURSE PRACTITIONER

## 2024-02-07 PROCEDURE — 3046F HEMOGLOBIN A1C LEVEL >9.0%: CPT | Mod: CPTII,,, | Performed by: NURSE PRACTITIONER

## 2024-02-07 PROCEDURE — 3079F DIAST BP 80-89 MM HG: CPT | Mod: CPTII,,, | Performed by: NURSE PRACTITIONER

## 2024-02-07 RX ORDER — INSULIN ASPART 100 [IU]/ML
15 INJECTION, SOLUTION INTRAVENOUS; SUBCUTANEOUS 3 TIMES DAILY
Qty: 13.5 ML | Refills: 0 | Status: SHIPPED | OUTPATIENT
Start: 2024-02-07 | End: 2024-03-07 | Stop reason: SDUPTHER

## 2024-02-07 RX ORDER — PEN NEEDLE, DIABETIC 30 GX3/16"
4 NEEDLE, DISPOSABLE MISCELLANEOUS DAILY
Qty: 200 EACH | Refills: 2 | Status: SHIPPED | OUTPATIENT
Start: 2024-02-07 | End: 2024-05-09 | Stop reason: SDUPTHER

## 2024-02-07 RX ORDER — INSULIN DETEMIR 100 [IU]/ML
20 INJECTION, SOLUTION SUBCUTANEOUS NIGHTLY
Qty: 6 ML | Refills: 11 | Status: SHIPPED | OUTPATIENT
Start: 2024-02-07 | End: 2024-03-07 | Stop reason: SDUPTHER

## 2024-02-07 RX ORDER — METFORMIN HYDROCHLORIDE 500 MG/1
1000 TABLET ORAL 2 TIMES DAILY WITH MEALS
Qty: 120 TABLET | Refills: 3 | Status: SHIPPED | OUTPATIENT
Start: 2024-02-07

## 2024-02-07 RX ORDER — BLOOD-GLUCOSE TRANSMITTER
1 EACH MISCELLANEOUS CONTINUOUS
Qty: 1 EACH | Refills: 0 | Status: SHIPPED | OUTPATIENT
Start: 2024-02-07 | End: 2024-03-07 | Stop reason: SDUPTHER

## 2024-02-07 RX ORDER — BLOOD-GLUCOSE SENSOR
1 EACH MISCELLANEOUS CONTINUOUS
Qty: 5 EACH | Refills: 3 | Status: SHIPPED | OUTPATIENT
Start: 2024-02-07 | End: 2024-03-07 | Stop reason: SDUPTHER
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
## 2024-02-08 ENCOUNTER — INFUSION (OUTPATIENT)
Dept: INFUSION THERAPY | Facility: HOSPITAL | Age: 49
End: 2024-02-08
Attending: INTERNAL MEDICINE
Payer: MEDICAID

## 2024-02-08 VITALS
RESPIRATION RATE: 17 BRPM | SYSTOLIC BLOOD PRESSURE: 140 MMHG | TEMPERATURE: 98 F | HEART RATE: 109 BPM | DIASTOLIC BLOOD PRESSURE: 72 MMHG | OXYGEN SATURATION: 98 %

## 2024-02-08 DIAGNOSIS — L03.90 WOUND CELLULITIS: ICD-10-CM

## 2024-02-08 DIAGNOSIS — L03.115 CELLULITIS OF RIGHT LOWER EXTREMITY: Primary | ICD-10-CM

## 2024-02-08 LAB
ALBUMIN SERPL BCP-MCNC: 3.1 G/DL (ref 3.5–5.2)
ALP SERPL-CCNC: 110 U/L (ref 55–135)
ALT SERPL W/O P-5'-P-CCNC: 28 U/L (ref 10–44)
ANION GAP SERPL CALC-SCNC: 10 MMOL/L (ref 8–16)
AST SERPL-CCNC: 16 U/L (ref 10–40)
BASOPHILS # BLD AUTO: 0.04 K/UL (ref 0–0.2)
BASOPHILS # BLD AUTO: 0.05 K/UL (ref 0–0.2)
BASOPHILS NFR BLD: 0.3 % (ref 0–1.9)
BASOPHILS NFR BLD: 0.4 % (ref 0–1.9)
BILIRUB SERPL-MCNC: 0.2 MG/DL (ref 0.1–1)
BUN SERPL-MCNC: 27 MG/DL (ref 6–20)
CALCIUM SERPL-MCNC: 9.5 MG/DL (ref 8.7–10.5)
CHLORIDE SERPL-SCNC: 101 MMOL/L (ref 95–110)
CO2 SERPL-SCNC: 26 MMOL/L (ref 23–29)
CREAT SERPL-MCNC: 0.8 MG/DL (ref 0.5–1.4)
CRP SERPL-MCNC: 5.5 MG/L (ref 0–8.2)
DIFFERENTIAL METHOD BLD: ABNORMAL
DIFFERENTIAL METHOD BLD: ABNORMAL
EOSINOPHIL # BLD AUTO: 0 K/UL (ref 0–0.5)
EOSINOPHIL # BLD AUTO: 0 K/UL (ref 0–0.5)
EOSINOPHIL NFR BLD: 0.3 % (ref 0–8)
EOSINOPHIL NFR BLD: 0.3 % (ref 0–8)
ERYTHROCYTE [DISTWIDTH] IN BLOOD BY AUTOMATED COUNT: 13.9 % (ref 11.5–14.5)
ERYTHROCYTE [DISTWIDTH] IN BLOOD BY AUTOMATED COUNT: 14.1 % (ref 11.5–14.5)
ERYTHROCYTE [SEDIMENTATION RATE] IN BLOOD BY WESTERGREN METHOD: 80 MM/HR (ref 0–10)
ERYTHROCYTE [SEDIMENTATION RATE] IN BLOOD BY WESTERGREN METHOD: 80 MM/HR (ref 0–10)
EST. GFR  (NO RACE VARIABLE): >60 ML/MIN/1.73 M^2
GLUCOSE SERPL-MCNC: 204 MG/DL (ref 70–110)
HCT VFR BLD AUTO: 34.4 % (ref 40–54)
HCT VFR BLD AUTO: 34.4 % (ref 40–54)
HGB BLD-MCNC: 10.9 G/DL (ref 14–18)
HGB BLD-MCNC: 11 G/DL (ref 14–18)
IMM GRANULOCYTES # BLD AUTO: 0.05 K/UL (ref 0–0.04)
IMM GRANULOCYTES # BLD AUTO: 0.06 K/UL (ref 0–0.04)
IMM GRANULOCYTES NFR BLD AUTO: 0.4 % (ref 0–0.5)
IMM GRANULOCYTES NFR BLD AUTO: 0.5 % (ref 0–0.5)
LYMPHOCYTES # BLD AUTO: 1.8 K/UL (ref 1–4.8)
LYMPHOCYTES # BLD AUTO: 1.9 K/UL (ref 1–4.8)
LYMPHOCYTES NFR BLD: 15.9 % (ref 18–48)
LYMPHOCYTES NFR BLD: 16.2 % (ref 18–48)
MCH RBC QN AUTO: 27.5 PG (ref 27–31)
MCH RBC QN AUTO: 27.7 PG (ref 27–31)
MCHC RBC AUTO-ENTMCNC: 31.7 G/DL (ref 32–36)
MCHC RBC AUTO-ENTMCNC: 32 G/DL (ref 32–36)
MCV RBC AUTO: 87 FL (ref 82–98)
MCV RBC AUTO: 87 FL (ref 82–98)
MONOCYTES # BLD AUTO: 0.3 K/UL (ref 0.3–1)
MONOCYTES # BLD AUTO: 0.3 K/UL (ref 0.3–1)
MONOCYTES NFR BLD: 2.5 % (ref 4–15)
MONOCYTES NFR BLD: 3 % (ref 4–15)
NEUTROPHILS # BLD AUTO: 9.1 K/UL (ref 1.8–7.7)
NEUTROPHILS # BLD AUTO: 9.2 K/UL (ref 1.8–7.7)
NEUTROPHILS NFR BLD: 79.7 % (ref 38–73)
NEUTROPHILS NFR BLD: 80.5 % (ref 38–73)
NRBC BLD-RTO: 0 /100 WBC
NRBC BLD-RTO: 0 /100 WBC
PLATELET # BLD AUTO: 567 K/UL (ref 150–450)
PLATELET # BLD AUTO: 575 K/UL (ref 150–450)
PMV BLD AUTO: 7.6 FL (ref 9.2–12.9)
PMV BLD AUTO: 7.7 FL (ref 9.2–12.9)
POTASSIUM SERPL-SCNC: 3.8 MMOL/L (ref 3.5–5.1)
PROT SERPL-MCNC: 7.8 G/DL (ref 6–8.4)
RBC # BLD AUTO: 3.97 M/UL (ref 4.6–6.2)
RBC # BLD AUTO: 3.97 M/UL (ref 4.6–6.2)
SODIUM SERPL-SCNC: 137 MMOL/L (ref 136–145)
WBC # BLD AUTO: 11.41 K/UL (ref 3.9–12.7)
WBC # BLD AUTO: 11.45 K/UL (ref 3.9–12.7)

## 2024-02-08 PROCEDURE — 86140 C-REACTIVE PROTEIN: CPT | Performed by: INTERNAL MEDICINE

## 2024-02-08 PROCEDURE — 99211 OFF/OP EST MAY X REQ PHY/QHP: CPT | Mod: 25

## 2024-02-08 PROCEDURE — 85651 RBC SED RATE NONAUTOMATED: CPT | Performed by: INTERNAL MEDICINE

## 2024-02-08 PROCEDURE — 36591 DRAW BLOOD OFF VENOUS DEVICE: CPT

## 2024-02-08 PROCEDURE — 80053 COMPREHEN METABOLIC PANEL: CPT | Performed by: INTERNAL MEDICINE

## 2024-02-08 PROCEDURE — 85025 COMPLETE CBC W/AUTO DIFF WBC: CPT | Performed by: INTERNAL MEDICINE

## 2024-02-08 RX ORDER — SODIUM CHLORIDE 0.9 % (FLUSH) 0.9 %
10 SYRINGE (ML) INJECTION
Status: DISCONTINUED | OUTPATIENT
Start: 2024-02-08 | End: 2024-02-08 | Stop reason: HOSPADM

## 2024-02-08 RX ORDER — HEPARIN 100 UNIT/ML
500 SYRINGE INTRAVENOUS
Status: CANCELLED | OUTPATIENT
Start: 2024-02-08

## 2024-02-08 RX ORDER — HEPARIN 100 UNIT/ML
500 SYRINGE INTRAVENOUS
Status: DISCONTINUED | OUTPATIENT
Start: 2024-02-08 | End: 2024-02-08 | Stop reason: HOSPADM

## 2024-02-08 RX ORDER — SODIUM CHLORIDE 0.9 % (FLUSH) 0.9 %
10 SYRINGE (ML) INJECTION
Status: CANCELLED | OUTPATIENT
Start: 2024-02-08

## 2024-02-08 NOTE — PROGRESS NOTES
Pt arrived to room walking  midline line labs drawn and dressing change done. Line saline locked.

## 2024-02-12 ENCOUNTER — PATIENT OUTREACH (OUTPATIENT)
Dept: DIABETES | Facility: CLINIC | Age: 49
End: 2024-02-12
Payer: MEDICAID

## 2024-02-12 NOTE — PATIENT INSTRUCTIONS
Educator was asked to contact pt about appt for DM Education.  Called today and left message for pt with phone number for Educator to make appt.

## 2024-02-14 ENCOUNTER — TELEPHONE (OUTPATIENT)
Dept: FAMILY MEDICINE | Facility: CLINIC | Age: 49
End: 2024-02-14

## 2024-02-15 ENCOUNTER — INFUSION (OUTPATIENT)
Dept: INFUSION THERAPY | Facility: HOSPITAL | Age: 49
End: 2024-02-15
Attending: INTERNAL MEDICINE
Payer: MEDICAID

## 2024-02-15 ENCOUNTER — TELEPHONE (OUTPATIENT)
Dept: FAMILY MEDICINE | Facility: CLINIC | Age: 49
End: 2024-02-15

## 2024-02-15 VITALS
HEART RATE: 93 BPM | SYSTOLIC BLOOD PRESSURE: 165 MMHG | DIASTOLIC BLOOD PRESSURE: 88 MMHG | BODY MASS INDEX: 19.96 KG/M2 | TEMPERATURE: 98 F | OXYGEN SATURATION: 99 % | RESPIRATION RATE: 17 BRPM | WEIGHT: 127.44 LBS

## 2024-02-15 DIAGNOSIS — Z91.199 MEDICAL NON-COMPLIANCE: ICD-10-CM

## 2024-02-15 DIAGNOSIS — L03.115 CELLULITIS OF RIGHT LOWER EXTREMITY: Primary | ICD-10-CM

## 2024-02-15 DIAGNOSIS — E11.65 UNCONTROLLED TYPE 2 DIABETES MELLITUS WITH HYPERGLYCEMIA: ICD-10-CM

## 2024-02-15 LAB
ALBUMIN SERPL BCP-MCNC: 3.3 G/DL (ref 3.5–5.2)
ALP SERPL-CCNC: 113 U/L (ref 55–135)
ALT SERPL W/O P-5'-P-CCNC: 39 U/L (ref 10–44)
ANION GAP SERPL CALC-SCNC: 9 MMOL/L (ref 8–16)
AST SERPL-CCNC: 25 U/L (ref 10–40)
BASOPHILS # BLD AUTO: 0.07 K/UL (ref 0–0.2)
BASOPHILS NFR BLD: 0.8 % (ref 0–1.9)
BILIRUB SERPL-MCNC: 0.2 MG/DL (ref 0.1–1)
BUN SERPL-MCNC: 20 MG/DL (ref 6–20)
CALCIUM SERPL-MCNC: 9.5 MG/DL (ref 8.7–10.5)
CHLORIDE SERPL-SCNC: 104 MMOL/L (ref 95–110)
CO2 SERPL-SCNC: 23 MMOL/L (ref 23–29)
CREAT SERPL-MCNC: 1 MG/DL (ref 0.5–1.4)
DIFFERENTIAL METHOD BLD: ABNORMAL
EOSINOPHIL # BLD AUTO: 0.1 K/UL (ref 0–0.5)
EOSINOPHIL NFR BLD: 1.3 % (ref 0–8)
ERYTHROCYTE [DISTWIDTH] IN BLOOD BY AUTOMATED COUNT: 15.2 % (ref 11.5–14.5)
EST. GFR  (NO RACE VARIABLE): >60 ML/MIN/1.73 M^2
GLUCOSE SERPL-MCNC: 176 MG/DL (ref 70–110)
HCT VFR BLD AUTO: 38.6 % (ref 40–54)
HGB BLD-MCNC: 11.9 G/DL (ref 14–18)
IMM GRANULOCYTES # BLD AUTO: 0.03 K/UL (ref 0–0.04)
IMM GRANULOCYTES NFR BLD AUTO: 0.3 % (ref 0–0.5)
LYMPHOCYTES # BLD AUTO: 2.1 K/UL (ref 1–4.8)
LYMPHOCYTES NFR BLD: 24.2 % (ref 18–48)
MCH RBC QN AUTO: 27.7 PG (ref 27–31)
MCHC RBC AUTO-ENTMCNC: 30.8 G/DL (ref 32–36)
MCV RBC AUTO: 90 FL (ref 82–98)
MONOCYTES # BLD AUTO: 0.9 K/UL (ref 0.3–1)
MONOCYTES NFR BLD: 10.1 % (ref 4–15)
NEUTROPHILS # BLD AUTO: 5.6 K/UL (ref 1.8–7.7)
NEUTROPHILS NFR BLD: 63.3 % (ref 38–73)
NRBC BLD-RTO: 0 /100 WBC
PLATELET # BLD AUTO: 408 K/UL (ref 150–450)
PMV BLD AUTO: 7.9 FL (ref 9.2–12.9)
POTASSIUM SERPL-SCNC: 4.7 MMOL/L (ref 3.5–5.1)
PROT SERPL-MCNC: 8 G/DL (ref 6–8.4)
RBC # BLD AUTO: 4.3 M/UL (ref 4.6–6.2)
SODIUM SERPL-SCNC: 136 MMOL/L (ref 136–145)
WBC # BLD AUTO: 8.8 K/UL (ref 3.9–12.7)

## 2024-02-15 PROCEDURE — 80053 COMPREHEN METABOLIC PANEL: CPT | Performed by: NURSE PRACTITIONER

## 2024-02-15 PROCEDURE — 36591 DRAW BLOOD OFF VENOUS DEVICE: CPT

## 2024-02-15 PROCEDURE — 85025 COMPLETE CBC W/AUTO DIFF WBC: CPT | Performed by: NURSE PRACTITIONER

## 2024-02-15 RX ORDER — HEPARIN 100 UNIT/ML
500 SYRINGE INTRAVENOUS
Status: DISCONTINUED | OUTPATIENT
Start: 2024-02-15 | End: 2024-02-15

## 2024-02-15 RX ORDER — SODIUM CHLORIDE 0.9 % (FLUSH) 0.9 %
10 SYRINGE (ML) INJECTION
Status: DISCONTINUED | OUTPATIENT
Start: 2024-02-15 | End: 2024-02-15

## 2024-02-15 RX ORDER — SODIUM CHLORIDE 0.9 % (FLUSH) 0.9 %
10 SYRINGE (ML) INJECTION
Status: CANCELLED | OUTPATIENT
Start: 2024-02-15

## 2024-02-15 RX ORDER — HEPARIN 100 UNIT/ML
500 SYRINGE INTRAVENOUS
Status: CANCELLED | OUTPATIENT
Start: 2024-02-15

## 2024-02-15 NOTE — PROGRESS NOTES
Pt came he needed labs drawn and midline discontinued. We did labs and removed the midline gauze dressing placed. Pt discharged to home.

## 2024-02-21 ENCOUNTER — PATIENT OUTREACH (OUTPATIENT)
Dept: DIABETES | Facility: CLINIC | Age: 49
End: 2024-02-21
Payer: MEDICAID

## 2024-02-21 NOTE — PATIENT INSTRUCTIONS
Called and left another message for pt regarding setting up an appt for DM Education per  Aletha Henson's , NP recommendation.  Provided pt with phone number for Educator to call back if interested in making an appt.

## 2024-03-07 ENCOUNTER — OFFICE VISIT (OUTPATIENT)
Dept: FAMILY MEDICINE | Facility: CLINIC | Age: 49
End: 2024-03-07
Payer: MEDICAID

## 2024-03-07 VITALS
BODY MASS INDEX: 19.82 KG/M2 | RESPIRATION RATE: 20 BRPM | TEMPERATURE: 98 F | WEIGHT: 126.31 LBS | SYSTOLIC BLOOD PRESSURE: 120 MMHG | DIASTOLIC BLOOD PRESSURE: 80 MMHG | HEART RATE: 100 BPM | HEIGHT: 67 IN

## 2024-03-07 DIAGNOSIS — N52.1 ERECTILE DYSFUNCTION ASSOCIATED WITH TYPE 2 DIABETES MELLITUS: ICD-10-CM

## 2024-03-07 DIAGNOSIS — E11.65 UNCONTROLLED TYPE 2 DIABETES MELLITUS WITH HYPERGLYCEMIA: ICD-10-CM

## 2024-03-07 DIAGNOSIS — E78.5 DYSLIPIDEMIA: Primary | ICD-10-CM

## 2024-03-07 DIAGNOSIS — E11.69 ERECTILE DYSFUNCTION ASSOCIATED WITH TYPE 2 DIABETES MELLITUS: ICD-10-CM

## 2024-03-07 PROCEDURE — 99214 OFFICE O/P EST MOD 30 MIN: CPT | Mod: S$PBB,,, | Performed by: NURSE PRACTITIONER

## 2024-03-07 PROCEDURE — 3008F BODY MASS INDEX DOCD: CPT | Mod: CPTII,,, | Performed by: NURSE PRACTITIONER

## 2024-03-07 PROCEDURE — 3079F DIAST BP 80-89 MM HG: CPT | Mod: CPTII,,, | Performed by: NURSE PRACTITIONER

## 2024-03-07 PROCEDURE — 1159F MED LIST DOCD IN RCRD: CPT | Mod: CPTII,,, | Performed by: NURSE PRACTITIONER

## 2024-03-07 PROCEDURE — 99999 PR PBB SHADOW E&M-EST. PATIENT-LVL IV: CPT | Mod: PBBFAC,,, | Performed by: NURSE PRACTITIONER

## 2024-03-07 PROCEDURE — 99214 OFFICE O/P EST MOD 30 MIN: CPT | Mod: PBBFAC,PN | Performed by: NURSE PRACTITIONER

## 2024-03-07 PROCEDURE — 3046F HEMOGLOBIN A1C LEVEL >9.0%: CPT | Mod: CPTII,,, | Performed by: NURSE PRACTITIONER

## 2024-03-07 PROCEDURE — 3074F SYST BP LT 130 MM HG: CPT | Mod: CPTII,,, | Performed by: NURSE PRACTITIONER

## 2024-03-07 RX ORDER — SILDENAFIL 25 MG/1
25 TABLET, FILM COATED ORAL DAILY PRN
Qty: 30 TABLET | Refills: 0 | Status: SHIPPED | OUTPATIENT
Start: 2024-03-07 | End: 2025-03-07

## 2024-03-07 RX ORDER — BLOOD-GLUCOSE SENSOR
1 EACH MISCELLANEOUS CONTINUOUS
Qty: 5 EACH | Refills: 3 | Status: SHIPPED | OUTPATIENT
Start: 2024-03-07 | End: 2025-03-07

## 2024-03-07 RX ORDER — BLOOD-GLUCOSE TRANSMITTER
1 EACH MISCELLANEOUS CONTINUOUS
Qty: 1 EACH | Refills: 0 | Status: SHIPPED | OUTPATIENT
Start: 2024-03-07 | End: 2025-03-07

## 2024-03-07 RX ORDER — INSULIN DETEMIR 100 [IU]/ML
25 INJECTION, SOLUTION SUBCUTANEOUS NIGHTLY
Qty: 7.5 ML | Refills: 11 | Status: SHIPPED | OUTPATIENT
Start: 2024-03-07 | End: 2024-05-09 | Stop reason: SDUPTHER

## 2024-03-07 RX ORDER — INSULIN ASPART 100 [IU]/ML
18 INJECTION, SOLUTION INTRAVENOUS; SUBCUTANEOUS 3 TIMES DAILY
Qty: 16.2 ML | Refills: 0 | Status: SHIPPED | OUTPATIENT
Start: 2024-03-07 | End: 2024-05-09 | Stop reason: SDUPTHER

## 2024-03-12 ENCOUNTER — PATIENT MESSAGE (OUTPATIENT)
Dept: ADMINISTRATIVE | Facility: HOSPITAL | Age: 49
End: 2024-03-12
Payer: MEDICAID

## 2024-03-13 NOTE — PROGRESS NOTES
Patient ID: Sergio Porter is a 48 y.o. male.    Chief Complaint: Follow-up (47 yo male here for 2 week B/P recheck. Pt states no c/o. KM)    Mr. Porter presents today for short interval follow up. His blood glucose levels remain elevated and it has been a challenge to manage his diabetes in the primary care setting and he will need referral to specialist at this time. I will place referral at this time. On chart review this has been apparent for several years. He denies any major interval changes. He is in need of medication refills. We spent time discussing diet changes as well during this visit. His wound care is going well according to patient. He denies any other issues or complaints.       Past Medical History:   Diagnosis Date    COVID-19     around june 2022, per patient/wife    Diabetes mellitus, type 2     Dyslipidemia     Opioid abuse      Past Surgical History:   Procedure Laterality Date    ANGIOGRAM, ABDOMINAL AORTA N/A 1/27/2024    Procedure: Angiogram, Abdominal Aorta;  Surgeon: Bhavesh Pelletier MD;  Location: [facility] CATH/EP LAB;  Service: General;  Laterality: N/A;    ANGIOGRAM, EXTREMITY, BILATERAL Right 1/27/2024    Procedure: ANGIOGRAM, EXTREMITY, BILATERAL;  Surgeon: Bhavesh Pelletier MD;  Location: [facility] CATH/EP LAB;  Service: General;  Laterality: Right;    INCISION AND DRAINAGE OF ABSCESS N/A 9/16/2022    Procedure: INCISION AND DRAINAGE, ABSCESS- BACK;  Surgeon: Glenn Fraser MD;  Location: [facility] OR;  Service: General;  Laterality: N/A;    INTRAVASCULAR ULTRASOUND, NON-CORONARY N/A 1/27/2024    Procedure: Intravascular Ultrasound, Non-Coronary;  Surgeon: Bhavesh Pelletier MD;  Location: [facility] CATH/EP LAB;  Service: General;  Laterality: N/A;    STENTS, ILIAC, BILATERAL Bilateral 1/27/2024    Procedure: Stents, Iliac, Bilateral;  Surgeon: Bhavesh Pelletier MD;  Location: [facility] CATH/EP LAB;  Service: General;  Laterality: Bilateral;         Tobacco History:  reports that he has been smoking  "cigarettes. He has a 28.0 pack-year smoking history. He has been exposed to tobacco smoke. He has never used smokeless tobacco.      Review of patient's allergies indicates:  No Known Allergies    Current Outpatient Medications:     atorvastatin (LIPITOR) 10 MG tablet, Take 1 tablet (10 mg total) by mouth every evening., Disp: 30 tablet, Rfl: 3    buprenorphine-naloxone 8-2 mg (SUBOXONE) 8-2 mg, Place 1 Film under the tongue once daily. Per patient, Disp: , Rfl:     metFORMIN (GLUCOPHAGE) 500 MG tablet, Take 2 tablets (1,000 mg total) by mouth 2 (two) times daily with meals., Disp: 120 tablet, Rfl: 3    mirtazapine (REMERON) 15 MG tablet, Take 15 mg by mouth every evening., Disp: , Rfl:     BD KEVIN 2ND GEN PEN NEEDLE 32 gauge x 5/32" Ndle, USE ONCE EVERY EVENING must last 90 days, Disp: , Rfl:     blood sugar diagnostic Strp, To check BG 3 times daily, to use with insurance preferred meter  Dx E 11.65, Disp: 100 each, Rfl: 1    blood-glucose meter kit, To check BG 3 times daily, to use with insurance preferred meter DX E 11.65, Disp: 1 each, Rfl: 1    blood-glucose sensor (DEXCOM G6 SENSOR) May, 1 each by Misc.(Non-Drug; Combo Route) route continuous., Disp: 5 each, Rfl: 3    blood-glucose transmitter (DEXCOM G6 TRANSMITTER) May, 1 each by Misc.(Non-Drug; Combo Route) route continuous., Disp: 1 each, Rfl: 0    clopidogreL (PLAVIX) 75 mg tablet, Take 1 tablet (75 mg total) by mouth once daily., Disp: 30 tablet, Rfl: 0    insulin aspart U-100 (NOVOLOG) 100 unit/mL (3 mL) InPn pen, Inject 18 Units into the skin 3 (three) times daily., Disp: 16.2 mL, Rfl: 0    insulin detemir U-100, Levemir, (LEVEMIR FLEXPEN) 100 unit/mL (3 mL) InPn pen, Inject 25 Units into the skin every evening., Disp: 7.5 mL, Rfl: 11    lancets Misc, To check BG 3 times daily, to use with insurance preferred meter Dx E 11.65, Disp: 100 each, Rfl: 1    nicotine (NICODERM CQ) 14 mg/24 hr, Place 1 patch onto the skin once daily. (Patient not taking: " "Reported on 2/7/2024), Disp: 14 patch, Rfl: 0    pen needle, diabetic 32 gauge x 5/32" Ndle, 4 Needles by Misc.(Non-Drug; Combo Route) route once daily., Disp: 200 each, Rfl: 2    sildenafiL (VIAGRA) 25 MG tablet, Take 1 tablet (25 mg total) by mouth daily as needed for Erectile Dysfunction., Disp: 30 tablet, Rfl: 0    Review of Systems   Constitutional:  Positive for activity change, appetite change and fatigue.   Musculoskeletal:  Positive for arthralgias.          Objective:      Vitals:    03/07/24 1116   BP: 120/80   Pulse: 100   Resp: 20   Temp: 98.1 °F (36.7 °C)   TempSrc: Oral   Weight: 57.3 kg (126 lb 4.8 oz)   Height: 5' 7" (1.702 m)     Physical Exam  Constitutional:       Appearance: He is underweight. He is ill-appearing and toxic-appearing.   Cardiovascular:      Rate and Rhythm: Regular rhythm.      Pulses: Normal pulses.      Heart sounds: Normal heart sounds.   Pulmonary:      Effort: Pulmonary effort is normal.      Breath sounds: Normal breath sounds.   Musculoskeletal:      Right foot: Decreased range of motion.        Legs:       Comments: Wound vac in place   Skin:     Findings: Wound present.   Neurological:      Mental Status: He is oriented to person, place, and time.   Psychiatric:         Mood and Affect: Mood normal.         Behavior: Behavior normal.           Assessment:       1. Dyslipidemia    2. Uncontrolled type 2 diabetes mellitus with hyperglycemia    3. Erectile dysfunction associated with type 2 diabetes mellitus           Plan:       Dyslipidemia  -     LIPID PANEL; Future; Expected date: 03/07/2024    Uncontrolled type 2 diabetes mellitus with hyperglycemia  -     blood-glucose sensor (DEXCOM G6 SENSOR) May; 1 each by Misc.(Non-Drug; Combo Route) route continuous.  Dispense: 5 each; Refill: 3  -     blood-glucose transmitter (DEXCOM G6 TRANSMITTER) May; 1 each by Misc.(Non-Drug; Combo Route) route continuous.  Dispense: 1 each; Refill: 0  -     Ambulatory referral/consult to " Endocrinology; Future; Expected date: 03/14/2024  -     insulin detemir U-100, Levemir, (LEVEMIR FLEXPEN) 100 unit/mL (3 mL) InPn pen; Inject 25 Units into the skin every evening.  Dispense: 7.5 mL; Refill: 11  -     insulin aspart U-100 (NOVOLOG) 100 unit/mL (3 mL) InPn pen; Inject 18 Units into the skin 3 (three) times daily.  Dispense: 16.2 mL; Refill: 0  -     HEMOGLOBIN A1C; Future; Expected date: 03/07/2024  -     COMPREHENSIVE METABOLIC PANEL; Future; Expected date: 03/07/2024    Erectile dysfunction associated with type 2 diabetes mellitus  -     sildenafiL (VIAGRA) 25 MG tablet; Take 1 tablet (25 mg total) by mouth daily as needed for Erectile Dysfunction.  Dispense: 30 tablet; Refill: 0      Follow up in about 8 weeks (around 5/1/2024), or if symptoms worsen or fail to improve, for DM.        Spent meghann 30 minutes with patient which involved review of pts medical conditions, labs, medications and with 50% of time face-to-face discussion about medical problems, management and any applicable changes.      3/17/2024 Aletha Henson NP

## 2024-03-26 ENCOUNTER — TELEPHONE (OUTPATIENT)
Dept: PODIATRY | Facility: CLINIC | Age: 49
End: 2024-03-26
Payer: MEDICAID

## 2024-03-26 NOTE — TELEPHONE ENCOUNTER
----- Message from Catherine Reed sent at 3/26/2024  1:40 PM CDT -----  Regarding: advice  Type:  Needs Medical Advice    Who Called: keysha seema     Best Call Back Number: [phone] ext [phone]    Additional Information: keysha wants a call back to get an update on pw they faxed over.  please call to discuss.

## 2024-03-26 NOTE — TELEPHONE ENCOUNTER
Called back number provided to speak with Diogenes. She was out of the office and a message was left for her to call us back. It looks like we haven't seen this pt.

## 2024-03-27 ENCOUNTER — TELEPHONE (OUTPATIENT)
Dept: PODIATRY | Facility: CLINIC | Age: 49
End: 2024-03-27
Payer: MEDICAID

## 2024-03-27 NOTE — TELEPHONE ENCOUNTER
----- Message from Giuliana Young sent at 3/27/2024  8:54 AM CDT -----  Contact: pablo  Type:  Patient Returning Call    Who Called:  pablo  Who Left Message for Patient:  kameron  Does the patient know what this is regarding?:  yes  Best Call Back Number:  [phone]  Additional Information:  please call

## 2024-03-27 NOTE — TELEPHONE ENCOUNTER
----- Message from Giuliana Young sent at 3/27/2024  8:54 AM CDT -----  Contact: pablo  Type:  Patient Returning Call    Who Called:  pablo  Who Left Message for Patient:  kameron  Does the patient know what this is regarding?:  yes  Best Call Back Number:  [phone]  Additional Information:  please call

## 2024-03-28 ENCOUNTER — PATIENT MESSAGE (OUTPATIENT)
Dept: ADMINISTRATIVE | Facility: HOSPITAL | Age: 49
End: 2024-03-28
Payer: MEDICAID

## 2024-03-28 ENCOUNTER — TELEPHONE (OUTPATIENT)
Dept: PODIATRY | Facility: CLINIC | Age: 49
End: 2024-03-28
Payer: MEDICAID

## 2024-03-28 NOTE — TELEPHONE ENCOUNTER
----- Message from Mali Bermudez sent at 3/28/2024 12:12 PM CDT -----  Regarding: RX for wound vac  Contact: kandis  Type:  Needs Medical Advice    Who Called: Kandis with [employer]  Would the patient rather a call back or a response via [facility]? Call back  Best Call Back Number: [phone]  ext [phone]  Fax [phone]  Additional Information: sts she wants to know the status of the Rx that she requested for the pt--please advise

## 2024-03-28 NOTE — TELEPHONE ENCOUNTER
Called back and left another message for WileyDiogenes that I can not find where we or wound care have ever seen this pt.

## 2024-04-30 ENCOUNTER — LAB VISIT (OUTPATIENT)
Dept: LAB | Facility: HOSPITAL | Age: 49
End: 2024-04-30
Attending: NURSE PRACTITIONER
Payer: MEDICAID

## 2024-04-30 DIAGNOSIS — E78.5 DYSLIPIDEMIA: ICD-10-CM

## 2024-04-30 DIAGNOSIS — E11.65 UNCONTROLLED TYPE 2 DIABETES MELLITUS WITH HYPERGLYCEMIA: ICD-10-CM

## 2024-04-30 LAB
ALBUMIN SERPL BCP-MCNC: 3.3 G/DL (ref 3.5–5.2)
ALP SERPL-CCNC: 119 U/L (ref 55–135)
ALT SERPL W/O P-5'-P-CCNC: 17 U/L (ref 10–44)
ANION GAP SERPL CALC-SCNC: 11 MMOL/L (ref 8–16)
AST SERPL-CCNC: 20 U/L (ref 10–40)
BILIRUB SERPL-MCNC: 0.2 MG/DL (ref 0.1–1)
BUN SERPL-MCNC: 13 MG/DL (ref 6–20)
CALCIUM SERPL-MCNC: 9.7 MG/DL (ref 8.7–10.5)
CHLORIDE SERPL-SCNC: 102 MMOL/L (ref 95–110)
CHOLEST SERPL-MCNC: 201 MG/DL (ref 120–199)
CHOLEST/HDLC SERPL: 4.1 {RATIO} (ref 2–5)
CO2 SERPL-SCNC: 25 MMOL/L (ref 23–29)
CREAT SERPL-MCNC: 0.8 MG/DL (ref 0.5–1.4)
EST. GFR  (NO RACE VARIABLE): >60 ML/MIN/1.73 M^2
ESTIMATED AVG GLUCOSE: 252 MG/DL (ref 68–131)
GLUCOSE SERPL-MCNC: 217 MG/DL (ref 70–110)
HBA1C MFR BLD: 10.4 % (ref 4–5.6)
HDLC SERPL-MCNC: 49 MG/DL (ref 40–75)
HDLC SERPL: 24.4 % (ref 20–50)
LDLC SERPL CALC-MCNC: 133.4 MG/DL (ref 63–159)
NONHDLC SERPL-MCNC: 152 MG/DL
POTASSIUM SERPL-SCNC: 5.4 MMOL/L (ref 3.5–5.1)
PROT SERPL-MCNC: 7.5 G/DL (ref 6–8.4)
SODIUM SERPL-SCNC: 138 MMOL/L (ref 136–145)
TRIGL SERPL-MCNC: 93 MG/DL (ref 30–150)

## 2024-04-30 PROCEDURE — 80061 LIPID PANEL: CPT | Performed by: NURSE PRACTITIONER

## 2024-04-30 PROCEDURE — 36415 COLL VENOUS BLD VENIPUNCTURE: CPT | Performed by: NURSE PRACTITIONER

## 2024-04-30 PROCEDURE — 80053 COMPREHEN METABOLIC PANEL: CPT | Performed by: NURSE PRACTITIONER

## 2024-04-30 PROCEDURE — 83036 HEMOGLOBIN GLYCOSYLATED A1C: CPT | Performed by: NURSE PRACTITIONER

## 2024-05-09 ENCOUNTER — OFFICE VISIT (OUTPATIENT)
Dept: FAMILY MEDICINE | Facility: CLINIC | Age: 49
End: 2024-05-09
Payer: MEDICAID

## 2024-05-09 VITALS
HEIGHT: 67 IN | DIASTOLIC BLOOD PRESSURE: 80 MMHG | SYSTOLIC BLOOD PRESSURE: 124 MMHG | WEIGHT: 120.19 LBS | TEMPERATURE: 98 F | BODY MASS INDEX: 18.87 KG/M2 | HEART RATE: 100 BPM | RESPIRATION RATE: 20 BRPM

## 2024-05-09 DIAGNOSIS — Z12.11 COLON CANCER SCREENING: ICD-10-CM

## 2024-05-09 DIAGNOSIS — E11.65 UNCONTROLLED TYPE 2 DIABETES MELLITUS WITH HYPERGLYCEMIA: Primary | ICD-10-CM

## 2024-05-09 DIAGNOSIS — I73.9 PERIPHERAL ARTERIAL DISEASE: ICD-10-CM

## 2024-05-09 PROCEDURE — 3079F DIAST BP 80-89 MM HG: CPT | Mod: CPTII,,, | Performed by: NURSE PRACTITIONER

## 2024-05-09 PROCEDURE — 99999 PR PBB SHADOW E&M-EST. PATIENT-LVL V: CPT | Mod: PBBFAC,,, | Performed by: NURSE PRACTITIONER

## 2024-05-09 PROCEDURE — 3008F BODY MASS INDEX DOCD: CPT | Mod: CPTII,,, | Performed by: NURSE PRACTITIONER

## 2024-05-09 PROCEDURE — 99215 OFFICE O/P EST HI 40 MIN: CPT | Mod: PBBFAC,PN | Performed by: NURSE PRACTITIONER

## 2024-05-09 PROCEDURE — 3046F HEMOGLOBIN A1C LEVEL >9.0%: CPT | Mod: CPTII,,, | Performed by: NURSE PRACTITIONER

## 2024-05-09 PROCEDURE — 1159F MED LIST DOCD IN RCRD: CPT | Mod: CPTII,,, | Performed by: NURSE PRACTITIONER

## 2024-05-09 PROCEDURE — 99214 OFFICE O/P EST MOD 30 MIN: CPT | Mod: S$PBB,,, | Performed by: NURSE PRACTITIONER

## 2024-05-09 PROCEDURE — 3074F SYST BP LT 130 MM HG: CPT | Mod: CPTII,,, | Performed by: NURSE PRACTITIONER

## 2024-05-09 RX ORDER — INSULIN DETEMIR 100 [IU]/ML
20 INJECTION, SOLUTION SUBCUTANEOUS NIGHTLY
Qty: 6 ML | Refills: 11 | Status: SHIPPED | OUTPATIENT
Start: 2024-05-09 | End: 2025-05-09

## 2024-05-09 RX ORDER — CLOPIDOGREL BISULFATE 75 MG/1
75 TABLET ORAL DAILY
Qty: 30 TABLET | Refills: 5 | Status: SHIPPED | OUTPATIENT
Start: 2024-05-09 | End: 2024-11-05

## 2024-05-09 RX ORDER — INSULIN ASPART 100 [IU]/ML
12 INJECTION, SOLUTION INTRAVENOUS; SUBCUTANEOUS 3 TIMES DAILY
Qty: 10.8 ML | Refills: 0 | Status: SHIPPED | OUTPATIENT
Start: 2024-05-09 | End: 2024-06-08

## 2024-05-09 RX ORDER — PEN NEEDLE, DIABETIC 30 GX3/16"
4 NEEDLE, DISPOSABLE MISCELLANEOUS DAILY
Qty: 200 EACH | Refills: 2 | Status: SHIPPED | OUTPATIENT
Start: 2024-05-09

## 2024-05-15 NOTE — PROGRESS NOTES
Patient ID: Sergio Porter is a 49 y.o. male.    Chief Complaint: Follow-up (48 yo male here or 2 month recheck DM. KM)    Mr. Porter presents today for follow up. He has been doing his best to manage his diabetes and has made major changes to his diet. His blood work was reviewed and  his A1C has improved but not at goal. His weight and appetite are stable.He is in need of medication refills at this time. He denies any new issues or complaints.     Diabetes  He presents for his follow-up diabetic visit. He has type 2 diabetes mellitus. The initial diagnosis of diabetes was made 10 years ago. His disease course has been improving. Hypoglycemia symptoms include nervousness/anxiousness. Associated symptoms include blurred vision, fatigue, foot paresthesias, weakness and weight loss. Symptoms are improving. Risk factors for coronary artery disease include diabetes mellitus, family history, dyslipidemia, hypertension, male sex and stress. He is compliant with treatment all of the time. His weight is fluctuating dramatically. He is following a diabetic diet. Meal planning includes avoidance of concentrated sweets. He has not had a previous visit with a dietitian. He rarely participates in exercise. His home blood glucose trend is fluctuating dramatically. His overall blood glucose range is 140-180 mg/dl. An ACE inhibitor/angiotensin II receptor blocker is not being taken. He does not see a podiatrist.Eye exam is not current.     Past Medical History:   Diagnosis Date    COVID-19     around june 2022, per patient/wife    Diabetes mellitus, type 2     Dyslipidemia     Opioid abuse      Past Surgical History:   Procedure Laterality Date    ANGIOGRAM, ABDOMINAL AORTA N/A 1/27/2024    Procedure: Angiogram, Abdominal Aorta;  Surgeon: Bhavesh Pelletier MD;  Location: [facility] CATH/EP LAB;  Service: General;  Laterality: N/A;    ANGIOGRAM, EXTREMITY, BILATERAL Right 1/27/2024    Procedure: ANGIOGRAM, EXTREMITY, BILATERAL;  Surgeon:  "Bhavesh Pelletier MD;  Location: [facility] CATH/EP LAB;  Service: General;  Laterality: Right;    INCISION AND DRAINAGE OF ABSCESS N/A 9/16/2022    Procedure: INCISION AND DRAINAGE, ABSCESS- BACK;  Surgeon: Glenn Fraser MD;  Location: [facility] OR;  Service: General;  Laterality: N/A;    INTRAVASCULAR ULTRASOUND, NON-CORONARY N/A 1/27/2024    Procedure: Intravascular Ultrasound, Non-Coronary;  Surgeon: Bhavesh Pelletier MD;  Location: [facility] CATH/EP LAB;  Service: General;  Laterality: N/A;    STENTS, ILIAC, BILATERAL Bilateral 1/27/2024    Procedure: Stents, Iliac, Bilateral;  Surgeon: Bhavesh Pelletier MD;  Location: [facility] CATH/EP LAB;  Service: General;  Laterality: Bilateral;         Tobacco History:  reports that he has been smoking cigarettes. He has a 28 pack-year smoking history. He has been exposed to tobacco smoke. He has never used smokeless tobacco.      Review of patient's allergies indicates:  No Known Allergies    Current Outpatient Medications:     atorvastatin (LIPITOR) 10 MG tablet, Take 1 tablet (10 mg total) by mouth every evening., Disp: 30 tablet, Rfl: 3    buprenorphine-naloxone 8-2 mg (SUBOXONE) 8-2 mg, Place 1 Film under the tongue once daily. Per patient, Disp: , Rfl:     metFORMIN (GLUCOPHAGE) 500 MG tablet, Take 2 tablets (1,000 mg total) by mouth 2 (two) times daily with meals., Disp: 120 tablet, Rfl: 3    mirtazapine (REMERON) 15 MG tablet, Take 15 mg by mouth every evening., Disp: , Rfl:     sildenafiL (VIAGRA) 25 MG tablet, Take 1 tablet (25 mg total) by mouth daily as needed for Erectile Dysfunction., Disp: 30 tablet, Rfl: 0    BD KEVIN 2ND GEN PEN NEEDLE 32 gauge x 5/32" Ndle, USE ONCE EVERY EVENING must last 90 days, Disp: , Rfl:     blood sugar diagnostic Strp, To check BG 3 times daily, to use with insurance preferred meter  Dx E 11.65, Disp: 100 each, Rfl: 1    blood-glucose meter kit, To check BG 3 times daily, to use with insurance preferred meter DX E 11.65, Disp: 1 each, Rfl: 1    " "blood-glucose sensor (DEXCOM G6 SENSOR) May, 1 each by Misc.(Non-Drug; Combo Route) route continuous., Disp: 5 each, Rfl: 3    blood-glucose transmitter (DEXCOM G6 TRANSMITTER) May, 1 each by Misc.(Non-Drug; Combo Route) route continuous., Disp: 1 each, Rfl: 0    clopidogreL (PLAVIX) 75 mg tablet, Take 1 tablet (75 mg total) by mouth once daily., Disp: 30 tablet, Rfl: 5    insulin aspart U-100 (NOVOLOG) 100 unit/mL (3 mL) InPn pen, Inject 12 Units into the skin 3 (three) times daily., Disp: 10.8 mL, Rfl: 0    insulin detemir U-100, Levemir, (LEVEMIR FLEXPEN) 100 unit/mL (3 mL) InPn pen, Inject 20 Units into the skin every evening., Disp: 6 mL, Rfl: 11    lancets [facility], To check BG 3 times daily, to use with insurance preferred meter Dx E 11.65, Disp: 100 each, Rfl: 1    nicotine (NICODERM CQ) 14 mg/24 hr, Place 1 patch onto the skin once daily. (Patient not taking: Reported on 2/7/2024), Disp: 14 patch, Rfl: 0    pen needle, diabetic 32 gauge x 5/32" Ndle, 4 Needles by Misc.(Non-Drug; Combo Route) route once daily., Disp: 200 each, Rfl: 2    Review of Systems   Constitutional:  Positive for fatigue and weight loss.   Eyes:  Positive for blurred vision.   Musculoskeletal:  Positive for arthralgias.   Neurological:  Positive for weakness.   Psychiatric/Behavioral:  Positive for dysphoric mood. The patient is nervous/anxious.           Objective:      Vitals:    05/09/24 0921   BP: 124/80   Pulse: 100   Resp: 20   Temp: 97.8 °F (36.6 °C)   TempSrc: Oral   Weight: 54.5 kg (120 lb 3.2 oz)   Height: 5' 7" (1.702 m)     Physical Exam  Constitutional:       Appearance: He is ill-appearing.      Comments: Chronically ill appearing   Pulmonary:      Effort: Pulmonary effort is normal.      Breath sounds: Normal breath sounds.   Musculoskeletal:         General: Normal range of motion.   Skin:     General: Skin is warm.   Neurological:      Mental Status: He is alert and oriented to person, place, and time.       " "    Assessment:       1. Uncontrolled type 2 diabetes mellitus with hyperglycemia    2. Colon cancer screening    3. Peripheral arterial disease           Plan:       Uncontrolled type 2 diabetes mellitus with hyperglycemia  -     COMPREHENSIVE METABOLIC PANEL; Future; Expected date: 05/09/2024  -     HEMOGLOBIN A1C; Future; Expected date: 05/09/2024  -     insulin aspart U-100 (NOVOLOG) 100 unit/mL (3 mL) InPn pen; Inject 12 Units into the skin 3 (three) times daily.  Dispense: 10.8 mL; Refill: 0  -     insulin detemir U-100, Levemir, (LEVEMIR FLEXPEN) 100 unit/mL (3 mL) InPn pen; Inject 20 Units into the skin every evening.  Dispense: 6 mL; Refill: 11  -     pen needle, diabetic 32 gauge x 5/32" Ndle; 4 Needles by Misc.(Non-Drug; Combo Route) route once daily.  Dispense: 200 each; Refill: 2  -     Microalbumin/Creatinine Ratio, Urine; Future; Expected date: 05/09/2024  -     Ambulatory referral/consult to Ophthalmology; Future; Expected date: 05/16/2024    Colon cancer screening  -     Cologuard Screening (Multitarget Stool DNA); Future; Expected date: 05/09/2024    Peripheral arterial disease  -     clopidogreL (PLAVIX) 75 mg tablet; Take 1 tablet (75 mg total) by mouth once daily.  Dispense: 30 tablet; Refill: 5  -     Ambulatory referral/consult to Cardiology; Future; Expected date: 05/16/2024      Follow up in about 3 months (around 8/9/2024), or if symptoms worsen or fail to improve, for DM.       Discussed the following complications of DM Type 2: CAD, CVD, Retinopathy/Renal Failure, Nephropathy, Neuropathy.    Discussed Target A1C Goal <7.0% and Target fasting blood sugars () before each meal.    Discussed Lifestyle Modifications: Low glycemic index diet, Exercise (30-45 min) daily, Weight loss, and Smoking cessation     Spent meghann 30 minutes with patient which involved review of pts medical conditions, labs, medications and with 50% of time face-to-face discussion about medical problems, management " and any applicable changes.      5/15/2024 Aletha Henson NP

## 2024-07-26 ENCOUNTER — PATIENT MESSAGE (OUTPATIENT)
Dept: ADMINISTRATIVE | Facility: HOSPITAL | Age: 49
End: 2024-07-26
Payer: MEDICAID

## 2024-08-02 ENCOUNTER — PATIENT MESSAGE (OUTPATIENT)
Dept: ADMINISTRATIVE | Facility: HOSPITAL | Age: 49
End: 2024-08-02
Payer: MEDICAID

## 2024-08-09 ENCOUNTER — OFFICE VISIT (OUTPATIENT)
Dept: FAMILY MEDICINE | Facility: CLINIC | Age: 49
End: 2024-08-09
Payer: MEDICAID

## 2024-08-09 ENCOUNTER — LAB VISIT (OUTPATIENT)
Dept: LAB | Facility: HOSPITAL | Age: 49
End: 2024-08-09
Attending: NURSE PRACTITIONER
Payer: MEDICAID

## 2024-08-09 VITALS
OXYGEN SATURATION: 93 % | WEIGHT: 113.63 LBS | TEMPERATURE: 98 F | HEIGHT: 67 IN | SYSTOLIC BLOOD PRESSURE: 110 MMHG | RESPIRATION RATE: 18 BRPM | HEART RATE: 102 BPM | DIASTOLIC BLOOD PRESSURE: 70 MMHG | BODY MASS INDEX: 17.83 KG/M2

## 2024-08-09 DIAGNOSIS — E11.65 UNCONTROLLED TYPE 2 DIABETES MELLITUS WITH HYPERGLYCEMIA: ICD-10-CM

## 2024-08-09 DIAGNOSIS — E11.65 UNCONTROLLED TYPE 2 DIABETES MELLITUS WITH HYPERGLYCEMIA: Primary | ICD-10-CM

## 2024-08-09 DIAGNOSIS — G62.9 NEUROPATHY: ICD-10-CM

## 2024-08-09 DIAGNOSIS — Z91.199 MEDICAL NON-COMPLIANCE: ICD-10-CM

## 2024-08-09 LAB
ALBUMIN SERPL BCP-MCNC: 3.6 G/DL (ref 3.5–5.2)
ALBUMIN SERPL BCP-MCNC: 3.6 G/DL (ref 3.5–5.2)
ALBUMIN/CREAT UR: 27.5 UG/MG (ref 0–30)
ALP SERPL-CCNC: 137 U/L (ref 55–135)
ALP SERPL-CCNC: 137 U/L (ref 55–135)
ALT SERPL W/O P-5'-P-CCNC: 16 U/L (ref 10–44)
ALT SERPL W/O P-5'-P-CCNC: 16 U/L (ref 10–44)
ANION GAP SERPL CALC-SCNC: 12 MMOL/L (ref 8–16)
ANION GAP SERPL CALC-SCNC: 12 MMOL/L (ref 8–16)
AST SERPL-CCNC: 14 U/L (ref 10–40)
AST SERPL-CCNC: 14 U/L (ref 10–40)
BILIRUB SERPL-MCNC: 0.2 MG/DL (ref 0.1–1)
BILIRUB SERPL-MCNC: 0.2 MG/DL (ref 0.1–1)
BUN SERPL-MCNC: 29 MG/DL (ref 6–20)
BUN SERPL-MCNC: 29 MG/DL (ref 6–20)
CALCIUM SERPL-MCNC: 9.7 MG/DL (ref 8.7–10.5)
CALCIUM SERPL-MCNC: 9.7 MG/DL (ref 8.7–10.5)
CHLORIDE SERPL-SCNC: 102 MMOL/L (ref 95–110)
CHLORIDE SERPL-SCNC: 102 MMOL/L (ref 95–110)
CO2 SERPL-SCNC: 24 MMOL/L (ref 23–29)
CO2 SERPL-SCNC: 24 MMOL/L (ref 23–29)
CREAT SERPL-MCNC: 1.2 MG/DL (ref 0.5–1.4)
CREAT SERPL-MCNC: 1.2 MG/DL (ref 0.5–1.4)
CREAT UR-MCNC: 218 MG/DL (ref 23–375)
EST. GFR  (NO RACE VARIABLE): >60 ML/MIN/1.73 M^2
EST. GFR  (NO RACE VARIABLE): >60 ML/MIN/1.73 M^2
ESTIMATED AVG GLUCOSE: 280 MG/DL (ref 68–131)
ESTIMATED AVG GLUCOSE: 280 MG/DL (ref 68–131)
GLUCOSE SERPL-MCNC: 254 MG/DL (ref 70–110)
GLUCOSE SERPL-MCNC: 254 MG/DL (ref 70–110)
HBA1C MFR BLD: 11.4 % (ref 4–5.6)
HBA1C MFR BLD: 11.4 % (ref 4–5.6)
MICROALBUMIN UR DL<=1MG/L-MCNC: 60 UG/ML
POTASSIUM SERPL-SCNC: 4.8 MMOL/L (ref 3.5–5.1)
POTASSIUM SERPL-SCNC: 4.8 MMOL/L (ref 3.5–5.1)
PROT SERPL-MCNC: 7.7 G/DL (ref 6–8.4)
PROT SERPL-MCNC: 7.7 G/DL (ref 6–8.4)
SODIUM SERPL-SCNC: 138 MMOL/L (ref 136–145)
SODIUM SERPL-SCNC: 138 MMOL/L (ref 136–145)

## 2024-08-09 PROCEDURE — 99214 OFFICE O/P EST MOD 30 MIN: CPT | Mod: PBBFAC,PN | Performed by: NURSE PRACTITIONER

## 2024-08-09 PROCEDURE — 99999 PR PBB SHADOW E&M-EST. PATIENT-LVL IV: CPT | Mod: PBBFAC,,, | Performed by: NURSE PRACTITIONER

## 2024-08-09 PROCEDURE — 36415 COLL VENOUS BLD VENIPUNCTURE: CPT | Performed by: NURSE PRACTITIONER

## 2024-08-09 PROCEDURE — 83036 HEMOGLOBIN GLYCOSYLATED A1C: CPT | Performed by: NURSE PRACTITIONER

## 2024-08-09 PROCEDURE — 82570 ASSAY OF URINE CREATININE: CPT | Performed by: NURSE PRACTITIONER

## 2024-08-09 PROCEDURE — 80053 COMPREHEN METABOLIC PANEL: CPT | Performed by: NURSE PRACTITIONER

## 2024-08-09 RX ORDER — INSULIN DETEMIR 100 [IU]/ML
20 INJECTION, SOLUTION SUBCUTANEOUS NIGHTLY
Qty: 6 ML | Refills: 11 | Status: SHIPPED | OUTPATIENT
Start: 2024-08-09 | End: 2025-08-09

## 2024-08-09 RX ORDER — PEN NEEDLE, DIABETIC 30 GX3/16"
4 NEEDLE, DISPOSABLE MISCELLANEOUS DAILY
Qty: 200 EACH | Refills: 2 | Status: SHIPPED | OUTPATIENT
Start: 2024-08-09

## 2024-08-09 RX ORDER — GABAPENTIN 300 MG/1
300 CAPSULE ORAL 2 TIMES DAILY
Qty: 60 CAPSULE | Refills: 11 | Status: SHIPPED | OUTPATIENT
Start: 2024-08-09 | End: 2025-08-09

## 2024-08-09 RX ORDER — METFORMIN HYDROCHLORIDE 500 MG/1
1000 TABLET ORAL 2 TIMES DAILY WITH MEALS
Qty: 120 TABLET | Refills: 3 | Status: SHIPPED | OUTPATIENT
Start: 2024-08-09

## 2024-08-13 ENCOUNTER — OFFICE VISIT (OUTPATIENT)
Dept: ENDOCRINOLOGY | Facility: CLINIC | Age: 49
End: 2024-08-13
Payer: MEDICAID

## 2024-08-13 VITALS
HEIGHT: 67 IN | SYSTOLIC BLOOD PRESSURE: 110 MMHG | HEART RATE: 103 BPM | DIASTOLIC BLOOD PRESSURE: 70 MMHG | BODY MASS INDEX: 18.33 KG/M2 | WEIGHT: 116.75 LBS

## 2024-08-13 DIAGNOSIS — Z79.4 TYPE 2 DIABETES MELLITUS WITH DIABETIC NEUROPATHY, WITH LONG-TERM CURRENT USE OF INSULIN: ICD-10-CM

## 2024-08-13 DIAGNOSIS — E11.51 TYPE 2 DIABETES MELLITUS WITH DIABETIC PERIPHERAL ANGIOPATHY WITHOUT GANGRENE, WITH LONG-TERM CURRENT USE OF INSULIN: Primary | ICD-10-CM

## 2024-08-13 DIAGNOSIS — E11.40 TYPE 2 DIABETES MELLITUS WITH DIABETIC NEUROPATHY, WITH LONG-TERM CURRENT USE OF INSULIN: ICD-10-CM

## 2024-08-13 DIAGNOSIS — E78.5 DYSLIPIDEMIA: ICD-10-CM

## 2024-08-13 DIAGNOSIS — Z79.4 TYPE 2 DIABETES MELLITUS WITH DIABETIC PERIPHERAL ANGIOPATHY WITHOUT GANGRENE, WITH LONG-TERM CURRENT USE OF INSULIN: Primary | ICD-10-CM

## 2024-08-13 DIAGNOSIS — E11.65 UNCONTROLLED TYPE 2 DIABETES MELLITUS WITH HYPERGLYCEMIA: ICD-10-CM

## 2024-08-13 PROCEDURE — 99214 OFFICE O/P EST MOD 30 MIN: CPT | Mod: PBBFAC,PN | Performed by: NURSE PRACTITIONER

## 2024-08-13 PROCEDURE — 3078F DIAST BP <80 MM HG: CPT | Mod: CPTII,,, | Performed by: NURSE PRACTITIONER

## 2024-08-13 PROCEDURE — 1160F RVW MEDS BY RX/DR IN RCRD: CPT | Mod: CPTII,,, | Performed by: NURSE PRACTITIONER

## 2024-08-13 PROCEDURE — 3061F NEG MICROALBUMINURIA REV: CPT | Mod: CPTII,,, | Performed by: NURSE PRACTITIONER

## 2024-08-13 PROCEDURE — 3074F SYST BP LT 130 MM HG: CPT | Mod: CPTII,,, | Performed by: NURSE PRACTITIONER

## 2024-08-13 PROCEDURE — G2211 COMPLEX E/M VISIT ADD ON: HCPCS | Mod: S$PBB,,, | Performed by: NURSE PRACTITIONER

## 2024-08-13 PROCEDURE — 3046F HEMOGLOBIN A1C LEVEL >9.0%: CPT | Mod: CPTII,,, | Performed by: NURSE PRACTITIONER

## 2024-08-13 PROCEDURE — 3066F NEPHROPATHY DOC TX: CPT | Mod: CPTII,,, | Performed by: NURSE PRACTITIONER

## 2024-08-13 PROCEDURE — 1159F MED LIST DOCD IN RCRD: CPT | Mod: CPTII,,, | Performed by: NURSE PRACTITIONER

## 2024-08-13 PROCEDURE — 99999 PR PBB SHADOW E&M-EST. PATIENT-LVL IV: CPT | Mod: PBBFAC,,, | Performed by: NURSE PRACTITIONER

## 2024-08-13 PROCEDURE — 99205 OFFICE O/P NEW HI 60 MIN: CPT | Mod: S$PBB,,, | Performed by: NURSE PRACTITIONER

## 2024-08-13 PROCEDURE — 3008F BODY MASS INDEX DOCD: CPT | Mod: CPTII,,, | Performed by: NURSE PRACTITIONER

## 2024-08-13 RX ORDER — INSULIN ASPART 100 [IU]/ML
6 INJECTION, SOLUTION INTRAVENOUS; SUBCUTANEOUS
Qty: 15 ML | Refills: 6 | Status: SHIPPED | OUTPATIENT
Start: 2024-08-13 | End: 2024-09-12

## 2024-08-13 RX ORDER — BLOOD-GLUCOSE SENSOR
1 EACH MISCELLANEOUS
Qty: 9 EACH | Refills: 3 | Status: SHIPPED | OUTPATIENT
Start: 2024-08-13 | End: 2025-08-13

## 2024-08-13 RX ORDER — INSULIN DEGLUDEC 100 U/ML
20 INJECTION, SOLUTION SUBCUTANEOUS DAILY
Qty: 6 ML | Refills: 11 | Status: SHIPPED | OUTPATIENT
Start: 2024-08-13 | End: 2025-08-13

## 2024-08-13 RX ORDER — PEN NEEDLE, DIABETIC 30 GX3/16"
1 NEEDLE, DISPOSABLE MISCELLANEOUS 4 TIMES DAILY
Qty: 400 EACH | Refills: 3 | Status: SHIPPED | OUTPATIENT
Start: 2024-08-13

## 2024-08-13 NOTE — PATIENT INSTRUCTIONS
Download Dexcom G7 meghann. MUST USE SAME DEXCOM LOG IN NAME AND PASSWORD as you had with the G6 model. That way you don't end up with 2 accounts. Notify me once you have set up G7 so we can set up clinic sharing.     Use up Levemir supply. Then change to Tresiba 20 units daily. This is your long-acting insulin.    Take Novolog 6 units before every meal (meal-time/short-acting insulin). Take 5-15 minutes before the meals. Skip dose if skipping meal.

## 2024-08-13 NOTE — PROGRESS NOTES
"CC: Mr. Sergio Porter arrives today for management of Type 2  DM and review of chronic medical conditions, as listed in the Visit Diagnosis section of this encounter.       HPI: Mr. Sergio Porter was diagnosed with Type 2 DM in his 20s. He was diagnosed based on lab work. He states that he "didn't take it seriously" at first. Later started metformin. Insulin added in . + FH of DM in both parents, both grandmothers. Reports hospitalizations due to hyperglycemia, as high as 900s.   DM is complicated by PAD, neuropathy.  Following with Dr. Jiang in cardiology.     This is his first time being seen in endocrine.     He states that he was having recurrent hypoglycemia when on Novolog. Dose was 10 units. However, he was taking this morning, noon, and night  independent of meal time. Stopped taking Novolog 1 month ago but still has supply at home.     He previously used Dexcom G6 but accidentally threw away his transmitter.     BG readings are checked 4x/day. No logs to clinic. Reports the following:  Fastin-200  Lunch: 200-300  Dinner: 200-300    Hypoglycemia: No    Missing Insulin/PO medication doses: No    Exercise: No formal    Dietary Habits:  Eats 3 meals/day. May snack on can of string beans. Has cut out sodas. Does drink occasional sugary beverages but trying to make better choices.    Last DM education appointment:      CURRENT DIABETIC MEDS:  metformin 1000 mg BID, Levemir 20 units QAM   Vial or pen: pen  Glucometer type:     Previous DM treatments:  Novolog    Last Eye Exam: > 1 year. Has appt this month  Last Podiatry Exam: n/a    REVIEW OF SYSTEMS  Constitutional: no c/o fatigue, weakness, or weight loss.   Eyes: + blurred vision to R eye.   Cardiac: no palpitations or chest pain.  Respiratory: no cough. + dyspnea on exertion. Had recent stress test, which he reports was normal.  GI: no c/o abdominal pain. Denies h/o pancreatitis. + intermittent nausea (unsure of pattern).   : denies urinary " "frequency, burning, discharge, frequent UTIs  Skin: no lesions or rashes. + healing burn to L middle fingers. States this is improving.   Neuro: + numbness, tingling, or parasthesias in feet and hands  Endocrine: denies polyphagia, polydipsia, polyuria      Personally reviewed Past Medical, Surgical, Social History.    Vital Signs  Pulse 103   Ht 5' 7" (1.702 m)   Wt 53 kg (116 lb 11.7 oz)   BMI 18.28 kg/m²     Personally reviewed the below labs:    Hemoglobin A1C   Date Value Ref Range Status   08/09/2024 11.4 (H) 4.0 - 5.6 % Final     Comment:     ADA Screening Guidelines:  5.7-6.4%  Consistent with prediabetes  >or=6.5%  Consistent with diabetes    High levels of fetal hemoglobin interfere with the HbA1C  assay. Heterozygous hemoglobin variants (HbS, HgC, etc)do  not significantly interfere with this assay.   However, presence of multiple variants may affect accuracy.     08/09/2024 11.4 (H) 4.0 - 5.6 % Final     Comment:     ADA Screening Guidelines:  5.7-6.4%  Consistent with prediabetes  >or=6.5%  Consistent with diabetes    High levels of fetal hemoglobin interfere with the HbA1C  assay. Heterozygous hemoglobin variants (HbS, HgC, etc)do  not significantly interfere with this assay.   However, presence of multiple variants may affect accuracy.     04/30/2024 10.4 (H) 4.0 - 5.6 % Final     Comment:     ADA Screening Guidelines:  5.7-6.4%  Consistent with prediabetes  >or=6.5%  Consistent with diabetes    High levels of fetal hemoglobin interfere with the HbA1C  assay. Heterozygous hemoglobin variants (HbS, HgC, etc)do  not significantly interfere with this assay.   However, presence of multiple variants may affect accuracy.         Chemistry        Component Value Date/Time     08/09/2024 1011     08/09/2024 1011    K 4.8 08/09/2024 1011    K 4.8 08/09/2024 1011     08/09/2024 1011     08/09/2024 1011    CO2 24 08/09/2024 1011    CO2 24 08/09/2024 1011    BUN 29 (H) 08/09/2024 1011 " "   BUN 29 (H) 08/09/2024 1011    CREATININE 1.2 08/09/2024 1011    CREATININE 1.2 08/09/2024 1011     (H) 08/09/2024 1011     (H) 08/09/2024 1011        Component Value Date/Time    CALCIUM 9.7 08/09/2024 1011    CALCIUM 9.7 08/09/2024 1011    ALKPHOS 137 (H) 08/09/2024 1011    ALKPHOS 137 (H) 08/09/2024 1011    AST 14 08/09/2024 1011    AST 14 08/09/2024 1011    ALT 16 08/09/2024 1011    ALT 16 08/09/2024 1011    BILITOT 0.2 08/09/2024 1011    BILITOT 0.2 08/09/2024 1011    ESTGFRAFRICA >60.0 06/12/2020 1115    EGFRNONAA >60.0 06/12/2020 1115          Lab Results   Component Value Date    CHOL 201 (H) 04/30/2024    CHOL 146 09/16/2022    CHOL 167 06/12/2020     Lab Results   Component Value Date    HDL 49 04/30/2024    HDL 25 (L) 09/16/2022    HDL 62 06/12/2020     Lab Results   Component Value Date    LDLCALC 133.4 04/30/2024    LDLCALC 83.4 09/16/2022    LDLCALC 95.0 06/12/2020     Lab Results   Component Value Date    TRIG 93 04/30/2024    TRIG 188 (H) 09/16/2022    TRIG 50 06/12/2020     Lab Results   Component Value Date    CHOLHDL 24.4 04/30/2024    CHOLHDL 17.1 (L) 09/16/2022    CHOLHDL 37.1 06/12/2020       Lab Results   Component Value Date    MICALBCREAT 27.5 08/09/2024     Lab Results   Component Value Date    TSH 1.740 09/16/2022       CrCl cannot be calculated (Unknown ideal weight.).    No results found for: "[id number]"      PHYSICAL EXAMINATION  Constitutional: Appears well, no distress, thin  Respiratory: CTA, even and unlabored.  Cardiovascular: RRR, no murmurs, no carotid bruits.  GI: bowel sounds active, no hernia noted  Skin: warm and dry; no visible wounds  Neuro: oriented to person, place, time  Feet: appropriate footwear.  Protective Sensation (w/ 10 gram monofilament):  Right: Decreased  Left: Decreased    Visual Inspection:  Normal -  Left and Callus -  Right  plantar surface, near heel    Pedal Pulses:   Right: Present  Left: Diminished    Posterior Tibialis Pulses: "   Right:Present  Left: Diminished       Goals    None           Assessment/Plan  1. Type 2 diabetes mellitus with diabetic peripheral angiopathy without gangrene, with long-term current use of insulin  -- Complex. Chronically uncontrolled. I question dx of Type 2 DM. Very possibly CRISTIAN based on age diagnosed, body habitus, need for insulin. Will order testing to evaluate. If Type 2, consider other non-insulin options.  -- C-peptide, fasting glucose, ANABELLA, anti-islet cell ab, ZnT8 antibody, TSH this week  -- resume Novolog 6 units before each meal. Educated pt on proper timing.   -- change Levemir to Tresiba, continue 20 units QAM  -- can continue metformin for now but will likely stop  -- start Dexcom G7. Sample provided. Needs intensive monitoring. Advised pt to use existing log in info to set up meghann and let us know once he's done so  -- Diabetes education for diet.     -- Discussed diagnosis of DM, A1c goals, progression of disease, long term complications and tx options.  -- Reviewed hypoglycemia management: treat with 4 oz of juice, 4 oz regular soda, or 4 glucose tablets. Monitor and repeat treatment every 15 minutes until BG is >70 Then have a snack, which includes 15 grams of complex carbohydrates and protein.   Advised patient to check BG before activities, such as driving or exercise.   2. Type 2 diabetes mellitus with diabetic neuropathy, with long-term current use of insulin  -- discussed the importance of daily inspection of bottoms of both feet, interspace areas. Advised patient against walking barefoot.   3. Dyslipidemia  -- uncontrolled  -- continue atorvastatin        FOLLOW UP  Follow up in about 6 weeks (around 9/24/2024).   Patient instructed to bring BG logs to each follow up   Patient encouraged to call for any BG/medication issues, concerns, or questions.    Orders Placed This Encounter   Procedures    C-Peptide    Glutamic Acid Decarboxylase    Anti-islet cell antibody    Zinc Transporter 8  (ZnT8) Antibody    Glucose, Fasting    TSH    Hemoglobin A1C    Comprehensive Metabolic Panel    Ambulatory referral/consult to Diabetes Education

## 2024-08-13 NOTE — Clinical Note
Addended by: HAZEL HOYT on: 1/24/2018 10:15 AM     Modules accepted: Orders     FYI - I def think he's type 1. Getting labs. DM ed order placed for diet. Seeing you on 8/16.

## 2024-08-14 ENCOUNTER — LAB VISIT (OUTPATIENT)
Dept: LAB | Facility: HOSPITAL | Age: 49
End: 2024-08-14
Attending: NURSE PRACTITIONER
Payer: MEDICAID

## 2024-08-14 ENCOUNTER — TELEPHONE (OUTPATIENT)
Dept: ENDOCRINOLOGY | Facility: CLINIC | Age: 49
End: 2024-08-14
Payer: MEDICAID

## 2024-08-14 DIAGNOSIS — E11.51 TYPE 2 DIABETES MELLITUS WITH DIABETIC PERIPHERAL ANGIOPATHY WITHOUT GANGRENE, WITH LONG-TERM CURRENT USE OF INSULIN: ICD-10-CM

## 2024-08-14 DIAGNOSIS — Z79.4 TYPE 2 DIABETES MELLITUS WITH DIABETIC PERIPHERAL ANGIOPATHY WITHOUT GANGRENE, WITH LONG-TERM CURRENT USE OF INSULIN: ICD-10-CM

## 2024-08-14 LAB
C PEPTIDE SERPL-MCNC: 1.36 NG/ML (ref 0.78–5.19)
GLUCOSE SERPL-MCNC: 315 MG/DL (ref 70–110)
TSH SERPL DL<=0.005 MIU/L-ACNC: 1.11 UIU/ML (ref 0.4–4)

## 2024-08-14 PROCEDURE — 86341 ISLET CELL ANTIBODY: CPT | Mod: 91 | Performed by: NURSE PRACTITIONER

## 2024-08-14 PROCEDURE — 36415 COLL VENOUS BLD VENIPUNCTURE: CPT | Performed by: NURSE PRACTITIONER

## 2024-08-14 PROCEDURE — 82947 ASSAY GLUCOSE BLOOD QUANT: CPT | Performed by: NURSE PRACTITIONER

## 2024-08-14 PROCEDURE — 84681 ASSAY OF C-PEPTIDE: CPT | Performed by: NURSE PRACTITIONER

## 2024-08-14 PROCEDURE — 84443 ASSAY THYROID STIM HORMONE: CPT | Performed by: NURSE PRACTITIONER

## 2024-08-14 PROCEDURE — 86341 ISLET CELL ANTIBODY: CPT | Performed by: NURSE PRACTITIONER

## 2024-08-14 NOTE — TELEPHONE ENCOUNTER
----- Message from Skyla Gómezargenis sent at 8/14/2024  9:09 AM CDT -----  non-specific  Pt had the diabetic thing in his arm and he is calling to let you know  Please call back to advise  [phone]   Pt states he needs to give password   Thank you

## 2024-08-19 LAB
GAD65 AB SER-SCNC: 0 NMOL/L
PANC ISLET CELL IGG SER-ACNC: NORMAL

## 2024-08-21 LAB — ZNT8 AB SERPL IA-ACNC: <15 U/ML

## 2024-08-26 ENCOUNTER — PATIENT MESSAGE (OUTPATIENT)
Dept: ADMINISTRATIVE | Facility: HOSPITAL | Age: 49
End: 2024-08-26
Payer: MEDICAID

## 2024-08-27 NOTE — CONSULTS
Cathy SILVA also wrote orders  continue same recheck INR in 2 weeks.  After talking to both Ilana SILVA and Cathy SILVA will continue same dose recheck INR in 2 weeks instead of 1 month  rmklpn   [facility] wound vac approved for home.  Will update when ETA is known.

## 2024-09-19 DIAGNOSIS — E11.51 TYPE 2 DIABETES MELLITUS WITH DIABETIC PERIPHERAL ANGIOPATHY WITHOUT GANGRENE, WITH LONG-TERM CURRENT USE OF INSULIN: ICD-10-CM

## 2024-09-19 DIAGNOSIS — Z79.4 TYPE 2 DIABETES MELLITUS WITH DIABETIC PERIPHERAL ANGIOPATHY WITHOUT GANGRENE, WITH LONG-TERM CURRENT USE OF INSULIN: ICD-10-CM

## 2024-09-19 RX ORDER — BLOOD-GLUCOSE SENSOR
1 EACH MISCELLANEOUS
Qty: 9 EACH | Refills: 3 | Status: SHIPPED | OUTPATIENT
Start: 2024-09-19 | End: 2025-09-19

## 2024-09-19 NOTE — TELEPHONE ENCOUNTER
----- Message from Shala Esparza sent at 9/19/2024 10:10 AM CDT -----  Type: RX Refill Request    Who Called:  self     Have you contacted your pharmacy: no     Refill or New Rx: refill    RX Name and Strength: DEXCOM G7 SENSOR May      Preferred Pharmacy with phone number: [facility]  [place] - [address]   Phone: [phone]  Fax: [phone]    Local or Mail Order: local    Would the patient rather a call back or a response via [facility]?  call    Best Call Back Number: [phone] (home)      Additional Information:

## 2024-09-25 ENCOUNTER — OFFICE VISIT (OUTPATIENT)
Dept: ENDOCRINOLOGY | Facility: CLINIC | Age: 49
End: 2024-09-25
Payer: MEDICAID

## 2024-09-25 VITALS
HEART RATE: 107 BPM | HEIGHT: 67 IN | WEIGHT: 116.63 LBS | BODY MASS INDEX: 18.3 KG/M2 | DIASTOLIC BLOOD PRESSURE: 70 MMHG | SYSTOLIC BLOOD PRESSURE: 90 MMHG

## 2024-09-25 DIAGNOSIS — Z79.4 TYPE 2 DIABETES MELLITUS WITH DIABETIC PERIPHERAL ANGIOPATHY WITHOUT GANGRENE, WITH LONG-TERM CURRENT USE OF INSULIN: Primary | ICD-10-CM

## 2024-09-25 DIAGNOSIS — Z79.4 TYPE 2 DIABETES MELLITUS WITH RETINOPATHY OF BOTH EYES, WITH LONG-TERM CURRENT USE OF INSULIN, MACULAR EDEMA PRESENCE UNSPECIFIED, UNSPECIFIED RETINOPATHY SEVERITY: ICD-10-CM

## 2024-09-25 DIAGNOSIS — E11.51 TYPE 2 DIABETES MELLITUS WITH DIABETIC PERIPHERAL ANGIOPATHY WITHOUT GANGRENE, WITH LONG-TERM CURRENT USE OF INSULIN: Primary | ICD-10-CM

## 2024-09-25 DIAGNOSIS — E78.5 DYSLIPIDEMIA: ICD-10-CM

## 2024-09-25 DIAGNOSIS — Z72.0 TOBACCO ABUSE: ICD-10-CM

## 2024-09-25 DIAGNOSIS — E11.319 TYPE 2 DIABETES MELLITUS WITH RETINOPATHY OF BOTH EYES, WITH LONG-TERM CURRENT USE OF INSULIN, MACULAR EDEMA PRESENCE UNSPECIFIED, UNSPECIFIED RETINOPATHY SEVERITY: ICD-10-CM

## 2024-09-25 DIAGNOSIS — E11.40 TYPE 2 DIABETES MELLITUS WITH DIABETIC NEUROPATHY, WITH LONG-TERM CURRENT USE OF INSULIN: ICD-10-CM

## 2024-09-25 DIAGNOSIS — Z79.4 TYPE 2 DIABETES MELLITUS WITH DIABETIC NEUROPATHY, WITH LONG-TERM CURRENT USE OF INSULIN: ICD-10-CM

## 2024-09-25 PROCEDURE — 99999 PR PBB SHADOW E&M-EST. PATIENT-LVL V: CPT | Mod: PBBFAC,,, | Performed by: NURSE PRACTITIONER

## 2024-09-25 PROCEDURE — 99215 OFFICE O/P EST HI 40 MIN: CPT | Mod: PBBFAC,PN | Performed by: NURSE PRACTITIONER

## 2024-09-25 RX ORDER — INSULIN DEGLUDEC 100 U/ML
18 INJECTION, SOLUTION SUBCUTANEOUS DAILY
Start: 2024-09-25 | End: 2025-09-25

## 2024-09-25 NOTE — PROGRESS NOTES
"CC: Mr. Sergio Porter arrives today for management of Type 2  DM and review of chronic medical conditions, as listed in the Visit Diagnosis section of this encounter.       HPI: Mr. Sergio Porter was diagnosed with Type 2 DM in his 20s. He was diagnosed based on lab work. He states that he "didn't take it seriously" at first. Later started metformin. Insulin added in 2023. + FH of DM in both parents, both grandmothers. Reports hospitalizations due to hyperglycemia, as high as 900s.   DM is complicated by PAD, neuropathy.  Following with Dr. Jiang in cardiology.     Patient was last seen by me in August for initial evaluation. At that time, Novolog was resumed, Levemir changed to Tresiba, and Dexcom G7 was ordered. He had previously taken Novolog independent of meal time and was educated on proper timing. Labs were also obtained to rule our insulinopenia/CRISTIAN. All labs were normal.    Today, he states that he stopped Novolog 6 units with meals in August, due to reported hypoglycemia.     BG monitoring per Dexcom G7    Hypoglycemia: No    Missing Insulin/PO medication doses: No    Exercise: No formal    Dietary Habits:  Eats 2 meals/day. Grazes on Chinese food or whatever he has access to. Eats Chinese food at least 3x/week. Avoids sugary beverages. Reports drinking very little overall.     Last DM education appointment:      CURRENT DIABETIC MEDS:  metformin 1000 mg BID, Tresiba 20 units QAM, Novolog 6 units (not taking)   Vial or pen: pen  Glucometer type:     Previous DM treatments:      Last Eye Exam: 8/2024, +  Receiving injections monthly. Dr. Roper ([facility]) [phone]  Last Podiatry Exam: n/a    REVIEW OF SYSTEMS  Constitutional: no c/o weakness or weight loss. + fatigue  Cardiac: no palpitations or chest pain.  Respiratory: no cough. + dyspnea on exertion. Had stress test, which he reports was normal. He does smoke and would like to quit.   GI: no c/o abdominal pain. Denies h/o " "pancreatitis. + intermittent nausea (reports ~ once/month). + occasional bloating after meals.   Skin: no lesions or rashes.    Neuro: + numbness, tingling, parasthesias in feet and hands  Endocrine: denies polyphagia, polydipsia, polyuria      Personally reviewed Past Medical, Surgical, Social History.    Vital Signs  BP 90/70   Pulse 107   Ht 5' 7" (1.702 m)   Wt 52.9 kg (116 lb 10 oz)   BMI 18.27 kg/m²     Personally reviewed the below labs:    Hemoglobin A1C   Date Value Ref Range Status   08/09/2024 11.4 (H) 4.0 - 5.6 % Final     Comment:     ADA Screening Guidelines:  5.7-6.4%  Consistent with prediabetes  >or=6.5%  Consistent with diabetes    High levels of fetal hemoglobin interfere with the HbA1C  assay. Heterozygous hemoglobin variants (HbS, HgC, etc)do  not significantly interfere with this assay.   However, presence of multiple variants may affect accuracy.     08/09/2024 11.4 (H) 4.0 - 5.6 % Final     Comment:     ADA Screening Guidelines:  5.7-6.4%  Consistent with prediabetes  >or=6.5%  Consistent with diabetes    High levels of fetal hemoglobin interfere with the HbA1C  assay. Heterozygous hemoglobin variants (HbS, HgC, etc)do  not significantly interfere with this assay.   However, presence of multiple variants may affect accuracy.     04/30/2024 10.4 (H) 4.0 - 5.6 % Final     Comment:     ADA Screening Guidelines:  5.7-6.4%  Consistent with prediabetes  >or=6.5%  Consistent with diabetes    High levels of fetal hemoglobin interfere with the HbA1C  assay. Heterozygous hemoglobin variants (HbS, HgC, etc)do  not significantly interfere with this assay.   However, presence of multiple variants may affect accuracy.         Chemistry        Component Value Date/Time     08/09/2024 1011     08/09/2024 1011    K 4.8 08/09/2024 1011    K 4.8 08/09/2024 1011     08/09/2024 1011     08/09/2024 1011    CO2 24 08/09/2024 1011    CO2 24 08/09/2024 1011    BUN 29 (H) 08/09/2024 1011    " "BUN 29 (H) 08/09/2024 1011    CREATININE 1.2 08/09/2024 1011    CREATININE 1.2 08/09/2024 1011     (H) 08/09/2024 1011     (H) 08/09/2024 1011        Component Value Date/Time    CALCIUM 9.7 08/09/2024 1011    CALCIUM 9.7 08/09/2024 1011    ALKPHOS 137 (H) 08/09/2024 1011    ALKPHOS 137 (H) 08/09/2024 1011    AST 14 08/09/2024 1011    AST 14 08/09/2024 1011    ALT 16 08/09/2024 1011    ALT 16 08/09/2024 1011    BILITOT 0.2 08/09/2024 1011    BILITOT 0.2 08/09/2024 1011    ESTGFRAFRICA >60.0 06/12/2020 1115    EGFRNONAA >60.0 06/12/2020 1115          Lab Results   Component Value Date    CHOL 201 (H) 04/30/2024    CHOL 146 09/16/2022    CHOL 167 06/12/2020     Lab Results   Component Value Date    HDL 49 04/30/2024    HDL 25 (L) 09/16/2022    HDL 62 06/12/2020     Lab Results   Component Value Date    LDLCALC 133.4 04/30/2024    LDLCALC 83.4 09/16/2022    LDLCALC 95.0 06/12/2020     Lab Results   Component Value Date    TRIG 93 04/30/2024    TRIG 188 (H) 09/16/2022    TRIG 50 06/12/2020     Lab Results   Component Value Date    CHOLHDL 24.4 04/30/2024    CHOLHDL 17.1 (L) 09/16/2022    CHOLHDL 37.1 06/12/2020       Lab Results   Component Value Date    MICALBCREAT 27.5 08/09/2024     Lab Results   Component Value Date    TSH 1.115 08/14/2024       CrCl cannot be calculated (Patient's most recent lab result is older than the maximum 7 days allowed.).    No results found for: "[id number]"     Latest Reference Range & Units 08/14/24 08:09   C-Peptide 0.78 - 5.19 ng/mL 1.36   Glutamic Acid Decarb Ab <=0.02 nmol/L 0.00   ISLET CELL AB <1:4  <1:4   Zinc Transporter 8 (ZnT8) Antibody <15.0 U/mL <15.0         PHYSICAL EXAMINATION  Deferred       DEXCOM G7 DOWNLOAD: Fasting glucoses are well controlled but some fasting/nocturnal hypoglycemia noted. Occasional excursions, some higher than others.          Goals        HEMOGLOBIN A1C < 7               Assessment/Plan  1. Type 2 diabetes mellitus with diabetic " peripheral angiopathy without gangrene, with long-term current use of insulin  -- Uncontrolled but improving.CGM data reveals better control than last A1c suggests. C-peptide and antibody tests are normal. Some early AM hypoglycemia noted.   -- Decrease Tresiba to 18 units.   -- Start Januvia 100 mg every morning before breakfast.  -- Continue metformin.   -- caution with GLP-1RA, due to occasional nausea, bloating. Caution with SGLT2-I, due to minimal fluid intake. I advised him to drink plenty of water.  -- continue Dexcom G7.   -- Ambulatory referral/consult to Diabetes Education for diet.     -- Discussed diagnosis of DM, A1c goals, progression of disease, long term complications and tx options.  -- Reviewed hypoglycemia management: treat with 4 oz of juice, 4 oz regular soda, or 4 glucose tablets. Monitor and repeat treatment every 15 minutes until BG is >70 Then have a snack, which includes 15 grams of complex carbohydrates and protein.   Advised patient to check BG before activities, such as driving or exercise.   2. Type 2 diabetes mellitus with diabetic neuropathy, with long-term current use of insulin  -- discussed the importance of daily inspection of bottoms of both feet, interspace areas. Advised patient against walking barefoot.   3. Type 2 diabetes mellitus with retinopathy of both eyes, with long-term current use of insulin, macular edema presence unspecified, unspecified retinopathy severity  -- will call for eye exam report.    4. Dyslipidemia  -- uncontrolled  -- continue atorvastatin    5. Tobacco abuse --  Ambulatory referral/consult to Smoking Cessation Program       FOLLOW UP  As scheduled in January  Patient instructed to bring BG logs to each follow up   Patient encouraged to call for any BG/medication issues, concerns, or questions.    Orders Placed This Encounter   Procedures    Ambulatory referral/consult to Smoking Cessation Program    Ambulatory referral/consult to Diabetes Education

## 2024-09-25 NOTE — PATIENT INSTRUCTIONS
Alpha lipoic acid 600 mg daily  B complex vitamin    Decrease Tresiba to 18 units.     Start Januvia 100 mg every morning before breakfast.    Continue metformin.

## 2024-09-27 ENCOUNTER — CLINICAL SUPPORT (OUTPATIENT)
Dept: DIABETES | Facility: CLINIC | Age: 49
End: 2024-09-27
Payer: MEDICAID

## 2024-09-27 DIAGNOSIS — E11.51 TYPE 2 DIABETES MELLITUS WITH DIABETIC PERIPHERAL ANGIOPATHY WITHOUT GANGRENE, WITH LONG-TERM CURRENT USE OF INSULIN: ICD-10-CM

## 2024-09-27 DIAGNOSIS — Z79.4 TYPE 2 DIABETES MELLITUS WITH DIABETIC PERIPHERAL ANGIOPATHY WITHOUT GANGRENE, WITH LONG-TERM CURRENT USE OF INSULIN: ICD-10-CM

## 2024-09-27 PROCEDURE — 99213 OFFICE O/P EST LOW 20 MIN: CPT | Mod: PBBFAC,PO | Performed by: NUTRITIONIST

## 2024-09-27 PROCEDURE — 99999 PR PBB SHADOW E&M-EST. PATIENT-LVL III: CPT | Mod: PBBFAC,,, | Performed by: NUTRITIONIST

## 2024-09-27 NOTE — PROGRESS NOTES
Diabetes Care Specialist Progress Note  Author: Joy Mcintosh RD  Date: 9/27/2024    Referral 9/25/24    **Pt 40 mins late for appt**    Intake    Program Intake  Reason for Diabetes Program Visit:: Initial Diabetes Assessment  Current diabetes risk level:: high (T2DM Outcomes Risk=4)  Permission to speak with others about care:: no    Current Diabetes Treatment: Oral Medications, Insulin  Oral Medication Type/Dose:  (1000 mg Metformin BID;Januvia 100 mg in AM (to start tomorrow))  Method of insulin delivery?: Injections  Injection Type: Pens  Pen Type/Dose:  (Tresiba 18 untis at lunch; Novolog 6 units TIDWM (not taking))    Continuous Glucose Monitoring  Patient has CGM: Yes  Personal CGM type::  (Dexcom G7 via PHONE)    Lab Results   Component Value Date    HGBA1C 11.4 (H) 08/09/2024    HGBA1C 11.4 (H) 08/09/2024       Lifestyle Coping Support & Clinical    Lifestyle/Coping/Support  Compared to other people your age, how would you rate your health?: Good  Does anyone in your family have diabetes or does anyone in your family support you in your diabetes care?:  (support from family)  Learning Barriers:: None  Culture or Latter day beliefs that may impact ability to access healthcare: No  Psychosocial/Coping Skills Assessment Completed: : Yes  Assessment indicates:: Adequate understanding  Area of need?: No    Problem Review  Active Comorbidities: Cardiovascular Disease    Diabetes Self-Management Skills Assessment    Medication Skills Assessment  Patient is able to identify current diabetes medications, dosages, and appropriate timing of medications.: yes  Patient reports problems or concerns with current medication regimen.: yes  Medication regimen problems/concerns:: concerned about side effects (Pt is afraid to take Novolog and have lows)  Patient is  aware that some diabetes medications can cause low blood sugar?: Yes  Medication Skills Assessment Completed:: Yes  Assessment indicates:: Instruction Needed  Area  of need?: Yes    Diabetes Disease Process/Treatment Options  Diabetes Type?: Type II  When were you diagnosed?:  (dx in 20s)  If previous diabetes education, when/where::  (none)  Is patient aware of what causes diabetes?: Yes  Does patient understand the pathophysiology of diabetes?: No  Diabetes Disease Process/Treatment Options: Skills Assessment Completed: Yes  Assessment indicates:: Adequate understanding  Area of need?: Yes    Nutrition/Healthy Eating  Meal Plan 24 Hour Recall - Breakfast:  (skip; SF red bull once a week)  Meal Plan 24 Hour Recall - Lunch:  (Chinese food: Shrimp fried rice w/lobster sauce)  Meal Plan 24 Hour Recall - Dinner:  (lasagna, sometimes bread or soup (if at [facility]))  Meal Plan 24 Hour Recall - Beverage:  (diet coke; tends not to drink much at all during the day)  Who shops/cooks?:  (patient)  Patient can identify foods that impact blood sugar.: yes  Challenges to healthy eating:: portion control  Nutrition/Healthy Eating Skills Assessment Completed:: Yes  Assessment indicates:: Instruction Needed  Area of need?: Yes    Physical Activity/Exercise  Patient's daily activity level:: sedentary (pt states he is always tired recently)  Patient formally exercises outside of work.: no  Patient can identify forms of physical activity.: yes  Physical Activity/Exercise Skills Assessment Completed: : Yes  Assessment indicates:: Instruction Needed  Area of need?: Yes    Home Blood Glucose Monitoring  Patient states that blood sugar is checked at home daily.: yes  Monitoring Method:: personal continuous glucose monitor  Personal CGM type::  (Dexcom G7 via PHONE)  Home Blood Glucose Monitoring Skills Assessment Completed: : Yes  Assessment indicates:: Instruction Needed  Area of need?: Yes (need to set up clarity for sharing)    Acute Complications  Have you ever had hypoglycemia (low BG 70 or less)?: yes  How often and what are your symptoms?:  (shking, sweating)  How do you treat  hypoglycemia?:  (eat something)  Have you ever had hyperglycemia (high  or more)?: yes  Have you ever had DKA?: yes  Acute Complications Skills Assessment Completed: : Yes  Assessment indicates:: Instruction Needed  Area of need?: Yes    Chronic Complications  Reviewed health maintenance: yes  Have you completed your annual diabetes maintenance labwork? : yes  Has your doctor examined your feet?: yes  Chronic Complications Skills Assessment Completed: : Yes  Assessment indicates:: Adequate understanding  Area of need?: No      Assessment Summary and Plan    Based on today's diabetes care assessment, the following areas of need were identified:          9/27/2024    12:01 AM   Areas of Need   Medications/Current Diabetes Treatment Yes--see Care Plan   Lifestyle Coping Support No   Diabetes Disease Process/Treatment Options Yes--Discussed pathology of Type 2 diabetes, risk factors and treatment options.      Nutrition/Healthy Eating Yes--see Care Plan   Physical Activity/Exercise Yes--encouraged activity as tolerated   Home Blood Glucose Monitoring Yes--pt using Dexcom G7;  need to get pt to share via Clarity   Acute Complications Yes--Reviewed blood glucose goals, prevention, detection, signs and symptoms, and treatment of hypoglycemia and hyperglycemia, and when to contact the clinic.     Chronic Complications No       Today's interventions were provided through individual discussion, instruction, and written materials were provided.      Patient verbalized understanding of instruction and written materials.  Pt was able to return back demonstration of instructions today. Patient understood key points, needs reinforcement and further instruction.     Diabetes Self-Management Care Plan:    Today's Diabetes Self-Management Care Plan was developed with Sergio's input. Sergio has agreed to work toward the following goal(s) to improve his/her overall diabetes control.      Care Plan: Diabetes Management   Updates  made since 8/28/2024 12:00 AM        Problem: Healthy Eating         Goal: Eat 2-3 meals daily with fistful of Carbohydrate per meal.    Start Date: 9/27/2024   Expected End Date: 12/27/2024   Priority: Medium   Barriers: No Barriers Identified   Note:    Pt tends to only eat 2 meals daily; mostly from restaurant or frozen/easy to prepare.  He does enjoy non-starchy veggies and will cook an entire bag of frozen veggie at times.  Spend considerable time discussing healthy eating--not skipping meal; including all food groups.  Pt will need to ease into this; first rec is to reduce carb portions by half and increase non-starchy veggies and PRO at the same time. After pt comfortable with this, can get into more specifics--fistful=3 servings=45 gm carbs.  Also emphasized importance of drinking water/healthy fluids.  Pt tends to eat meals and never drink anything. REC water as best beverage but SF/diet/zero also acceptable.         Task: Reviewed the sources and role of Carbohydrate, Protein, and Fat and how each nutrient impacts blood sugar. Completed 9/27/2024        Task: Provided visual examples using dry measuring cups, food models, and other familiar objects such as computer mouse, deck or cards, tennis ball etc. to help with visualization of portions. Completed 9/27/2024        Task: Explained how to count carbohydrates using the food label and the use of dry measuring cups for accurate carb counting. Completed 9/27/2024        Task: Discussed strategies for choosing healthier menu options when dining out. Completed 9/27/2024        Task: Recommended replacing beverages containing high sugar content with noncaloric/sugar free options and/or water. Completed 9/27/2024        Task: Review the importance of balancing carbohydrates with each meal using portion control techniques to count servings of carbohydrate and label reading to identify serving size and amount of total carbs per serving. Completed 9/27/2024         Task: Provided Sample plate method and reviewed the use of the plate to estimate amounts of carbohydrate per meal. Completed 9/27/2024        Problem: Medications         Goal: Patient Agrees to take Diabetes Medication(s) as prescribed.    Start Date: 9/27/2024   Expected End Date: 12/27/2024   Priority: High   Note:    Pt saw Stacy on 9/25/27 and was given instructions about medication but is still not taking Novolog.  He is afraid of lows.  Discussed all aspects of insulin administration and how it works.  Reminded pt to take Novolog 5-15 mins BEFORE eating (wrote this down in red ink); to rotate injections site (pt only using one area in arm); change needle each time (is doing this).  Spent considerable time explaining on both types of insulin work so pt can more fully understand and be comfortable with this.  He knows to give novolog only if he eats; he is hesitant to take at all but very uncomfortable with 6 units; states he goes very low; tried to encourage start with 4 units.  Also showed pt how to document insulin amounts in his dexcom meghann.           Task: Reviewed with patient all current diabetes medications and provided basic review of the purpose, dosage, frequency, side effects, and storage of both oral and injectable diabetes medications. Completed 9/27/2024        Task: Instructed patient on how to self-administer Completed 9/27/2024        Task: Discussed guidelines for preventing, detecting and treating hypoglycemia and hyperglycemia and reviewed the importance of meal and medication timing with diabetes mediations for prevention of hypoglycemia and maximum drug benefit. Completed 9/27/2024          Follow Up Plan     Follow up in about 3 weeks (around 10/18/2024) for f/u 10/16/24 to see if pt is taking Novolog; did he start Januvia? what changes have been made in diet?  Have pt put Clarity meghann on phone    Today's care plan and follow up schedule was discussed with patient.  Sergio verbalized  understanding of the care plan, goals, and agrees to follow up plan.        The patient was encouraged to communicate with his/her health care provider/physician and care team regarding his/her condition(s) and treatment.  I provided the patient with my contact information today and encouraged to contact me via phone or [facility] Patient Portal as needed.     Length of Visit   Total Time: 60 Minutes

## 2024-10-01 ENCOUNTER — PATIENT MESSAGE (OUTPATIENT)
Dept: ADMINISTRATIVE | Facility: HOSPITAL | Age: 49
End: 2024-10-01
Payer: MEDICAID

## 2024-10-03 ENCOUNTER — CLINICAL SUPPORT (OUTPATIENT)
Dept: SMOKING CESSATION | Facility: CLINIC | Age: 49
End: 2024-10-03
Payer: MEDICAID

## 2024-10-03 DIAGNOSIS — F17.210 LIGHT CIGARETTE SMOKER (1-9 CIGS/DAY): Primary | ICD-10-CM

## 2024-10-03 PROCEDURE — 99404 PREV MED CNSL INDIV APPRX 60: CPT | Mod: ,,,

## 2024-10-03 PROCEDURE — 99999 PR PBB SHADOW E&M-EST. PATIENT-LVL I: CPT | Mod: PBBFAC,,,

## 2024-10-03 RX ORDER — DM/P-EPHED/ACETAMINOPH/DOXYLAM 30-7.5/3
2 LIQUID (ML) ORAL
Qty: 189 LOZENGE | Refills: 0 | Status: SHIPPED | OUTPATIENT
Start: 2024-10-03

## 2024-10-03 RX ORDER — IBUPROFEN 200 MG
1 TABLET ORAL DAILY
Qty: 28 PATCH | Refills: 0 | Status: SHIPPED | OUTPATIENT
Start: 2024-10-03

## 2024-10-03 NOTE — Clinical Note
Patient was seen for tobacco cessation intake assessment.  Patient will begin on 21 mg nicotine patch and 2 mg nicotine lozenge.  We discussed self awarness, triggers, tracking usage, reduction/treatment plan and follow up.

## 2024-10-16 ENCOUNTER — NUTRITION (OUTPATIENT)
Dept: DIABETES | Facility: CLINIC | Age: 49
End: 2024-10-16
Payer: MEDICAID

## 2024-10-16 ENCOUNTER — TELEPHONE (OUTPATIENT)
Dept: DIABETES | Facility: CLINIC | Age: 49
End: 2024-10-16
Payer: MEDICAID

## 2024-10-16 DIAGNOSIS — Z79.4 TYPE 2 DIABETES MELLITUS WITH DIABETIC PERIPHERAL ANGIOPATHY WITHOUT GANGRENE, WITH LONG-TERM CURRENT USE OF INSULIN: ICD-10-CM

## 2024-10-16 DIAGNOSIS — E11.51 TYPE 2 DIABETES MELLITUS WITH DIABETIC PERIPHERAL ANGIOPATHY WITHOUT GANGRENE, WITH LONG-TERM CURRENT USE OF INSULIN: ICD-10-CM

## 2024-10-16 DIAGNOSIS — Z79.4 TYPE 2 DIABETES MELLITUS WITH DIABETIC PERIPHERAL ANGIOPATHY WITHOUT GANGRENE, WITH LONG-TERM CURRENT USE OF INSULIN: Primary | ICD-10-CM

## 2024-10-16 DIAGNOSIS — E11.51 TYPE 2 DIABETES MELLITUS WITH DIABETIC PERIPHERAL ANGIOPATHY WITHOUT GANGRENE, WITH LONG-TERM CURRENT USE OF INSULIN: Primary | ICD-10-CM

## 2024-10-16 PROCEDURE — 99999 PR PBB SHADOW E&M-EST. PATIENT-LVL III: CPT | Mod: PBBFAC,,, | Performed by: NUTRITIONIST

## 2024-10-16 PROCEDURE — G0108 DIAB MANAGE TRN  PER INDIV: HCPCS | Mod: PBBFAC,PO | Performed by: NUTRITIONIST

## 2024-10-16 PROCEDURE — 99999PBSHW PR PBB SHADOW TECHNICAL ONLY FILED TO HB: Mod: PBBFAC,,,

## 2024-10-16 PROCEDURE — 99213 OFFICE O/P EST LOW 20 MIN: CPT | Mod: PBBFAC,PO | Performed by: NUTRITIONIST

## 2024-10-16 RX ORDER — INSULIN DEGLUDEC 100 U/ML
14 INJECTION, SOLUTION SUBCUTANEOUS DAILY
Start: 2024-10-16 | End: 2025-10-16

## 2024-10-16 NOTE — TELEPHONE ENCOUNTER
Please look at Dexcom report I have just downloaded.  Pt having multiple lows.    He is taking Tresiba 18 units HS; Metformin  1000 mg BID; Januvia 100 mg daily. No meal time Insulin.

## 2024-10-16 NOTE — PROGRESS NOTES
Diabetes Care Specialist Progress Note  Author: Joy Mcintosh RD  Date: 10/16/2024    Referral 9/25/24  Initial DM Assessment 9/27/24    Intake    Program Intake  Reason for Diabetes Program Visit:: Intervention  Type of Intervention:: Individual  Individual: Education  Education: Self-Management Skill Review, Nutrition and Meal Planning  Current diabetes risk level:: high (T2DM Outcomes Risk=4)  Permission to speak with others about care:: yes (Bobbi, wife ([phone]))    Current Diabetes Treatment: Oral Medications, Insulin  Oral Medication Type/Dose:  (Januvia 100 mg daily; Metformin 1000 mg BID)  Method of insulin delivery?: Injections  Injection Type: Pens  Pen Type/Dose:  (Tresiba 18 units)    Continuous Glucose Monitoring  Patient has CGM: Yes  Personal CGM type::  (Dexcom G7 via PHONE)  GMI Date: 10/16/24  GMI Value: 6.7 %    Lab Results   Component Value Date    HGBA1C 11.4 (H) 08/09/2024    HGBA1C 11.4 (H) 08/09/2024       Diabetes Self-Management Skills Assessment    Medication Skills Assessment  Patient is able to identify current diabetes medications, dosages, and appropriate timing of medications.: yes  Patient reports problems or concerns with current medication regimen.: yes  Medication regimen problems/concerns:: concerned about side effects (having lows)  Patient is  aware that some diabetes medications can cause low blood sugar?: Yes  Medication Skills Assessment Completed:: Yes  Assessment indicates:: Instruction Needed  Area of need?: Yes    Nutrition/Healthy Eating  Meal Plan 24 Hour Recall - Breakfast:  (red bull)  Meal Plan 24 Hour Recall - Lunch:  (chinese--shrimp fried rice, egg rolll, lobster sauce.  Eats 1/2 portion)  Meal Plan 24 Hour Recall - Dinner:  (ohter half of lunch OR nothing)  Meal Plan 24 Hour Recall - Snack:  (bag chips)  Patient can identify foods that impact blood sugar.: yes  Nutrition/Healthy Eating Skills Assessment Completed:: Yes  Assessment indicates:: Instruction  Needed  Area of need?: Yes    Home Blood Glucose Monitoring  Patient states that blood sugar is checked at home daily.: yes  Monitoring Method:: personal continuous glucose monitor  Personal CGM type::  (Dexcom G7 via PHONE)  Home Blood Glucose Monitoring Skills Assessment Completed: : Yes  Assessment indicates:: Instruction Needed  Area of need?: Yes      Pt has been having hypoglycemic events on Oct 16, 15, 13, 12, 6, 5 , and 3 and is not able to give any indication what may have occurred.    Pt has not been taking any Novolog in a long time so that is not contributing to events.         Assessment Summary and Plan    Based on today's diabetes care assessment, the following areas of need were identified:          10/16/2024   Areas of Need   Medications/Current Diabetes Treatment Yes--see Care Plan   Nutrition/Healthy Eating Yes--see Care Plan   Home Blood Glucose Monitoring Yes--see Care Plan      Today's interventions were provided through individual discussion, instruction, and written materials were provided.      Patient verbalized understanding of instruction and written materials.  Pt was able to return back demonstration of instructions today. Patient understood key points, needs reinforcement and further instruction.     Diabetes Self-Management Care Plan:    Today's Diabetes Self-Management Care Plan was developed with Sergio's input. Sergio has agreed to work toward the following goal(s) to improve his/her overall diabetes control.      Care Plan: Diabetes Management   Updates made since 9/16/2024 12:00 AM        Problem: Healthy Eating         Goal: Eat 2-3 meals daily with fistful of Carbohydrate per meal.    Start Date: 9/27/2024   Expected End Date: 12/27/2024   This Visit's Progress: On track   Priority: Medium   Barriers: No Barriers Identified   Note:    Pt tends to only eat 2 meals daily; mostly from restaurant or frozen/easy to prepare.  He does enjoy non-starchy veggies and will cook an entire  bag of frozen veggie at times.  Spend considerable time discussing healthy eating--not skipping meal; including all food groups.  Pt will need to ease into this; first rec is to reduce carb portions by half and increase non-starchy veggies and PRO at the same time. After pt comfortable with this, can get into more specifics--fistful=3 servings=45 gm carbs.  Also emphasized importance of drinking water/healthy fluids.  Pt tends to eat meals and never drink anything. REC water as best beverage but SF/diet/zero also acceptable.        10/16/24--Pt states he has been cutting his portions in half which is an improvement for him.  Still does not drink much of anything.        Task: Reviewed the sources and role of Carbohydrate, Protein, and Fat and how each nutrient impacts blood sugar. Completed 9/27/2024        Task: Provided visual examples using dry measuring cups, food models, and other familiar objects such as computer mouse, deck or cards, tennis ball etc. to help with visualization of portions. Completed 9/27/2024        Task: Explained how to count carbohydrates using the food label and the use of dry measuring cups for accurate carb counting. Completed 9/27/2024        Task: Discussed strategies for choosing healthier menu options when dining out. Completed 9/27/2024        Task: Recommended replacing beverages containing high sugar content with noncaloric/sugar free options and/or water. Completed 9/27/2024        Task: Review the importance of balancing carbohydrates with each meal using portion control techniques to count servings of carbohydrate and label reading to identify serving size and amount of total carbs per serving. Completed 9/27/2024        Task: Provided Sample plate method and reviewed the use of the plate to estimate amounts of carbohydrate per meal. Completed 9/27/2024        Problem: Medications         Goal: Patient Agrees to take Diabetes Medication(s) as prescribed.    Start Date:  9/27/2024   Expected End Date: 12/27/2024   This Visit's Progress: On track   Priority: High   Note:    Pt saw Stacy on 9/25/27 and was given instructions about medication but is still not taking Novolog.  He is afraid of lows.  Discussed all aspects of insulin administration and how it works.  Reminded pt to take Novolog 5-15 mins BEFORE eating (wrote this down in red ink); to rotate injections site (pt only using one area in arm); change needle each time (is doing this).  Spent considerable time explaining on both types of insulin work so pt can more fully understand and be comfortable with this.  He knows to give novolog only if he eats; he is hesitant to take at all but very uncomfortable with 6 units; states he goes very low; tried to encourage start with 4 units.  Also showed pt how to document insulin amounts in his dexcom meghann.      10/16/24--Pt still does not understand how long acting Tresiba works but did review with pt again.  He is NOT TAKING ANY MEAL TIME INSULIN.  Dexcom report shows that pt has had lows recently which pt cannot explain at all.  Contacted Stacy Diaz NP who rec to decrease Tresiba to 14 units; pt voiced understanding and will start today.         Task: Reviewed with patient all current diabetes medications and provided basic review of the purpose, dosage, frequency, side effects, and storage of both oral and injectable diabetes medications. Completed 9/27/2024        Task: Instructed patient on how to self-administer insulin Completed 9/27/2024        Task: Discussed guidelines for preventing, detecting and treating hypoglycemia and hyperglycemia and reviewed the importance of meal and medication timing with diabetes mediations for prevention of hypoglycemia and maximum drug benefit. Completed 9/27/2024        Problem: Blood Glucose Self-Monitoring         Goal: Patient will use Dexcom G7 CGM to continuously monitor blood glucose levels daily    Start Date: 10/16/2024   Expected  End Date: 1/16/2025   Priority: Low   Barriers: No Barriers Identified   Note:    Pt does not have Clarity on phone and did not know what his Dexcom log in was so, initially, not able to download Clarity report.  Pt contacted his wife Bobbi who is the one that will look at email (pt does not look at email on his phone) and she was able to reset password so pt and Educator could log into Dexcom and also log into Clarity as a home user.  This provided us with BG data.      User Name:  [email or web address]  Password: [email or web address]    While talking to Bobbi on phone, explained that we needed Clarity meghann on phone; wrote info down to assist Bobbi in putting Clarity on phone and setting up account (use same log in info).  Pt did not know Apple ID so could not put meghann on phone.  When meghann is on phone, Bobbi will contact Educator who will walk her through the sharing process (she can enter sharing code into phone meghann as instructed by Educator).    Alarms:           Problem: Acute Complications         Goal: Patient agrees to identify and manage signs and symptoms of high/low blood sugar (hyper/hypoglycemia) by keeping a log of events and using proper treatment.    Start Date: 10/16/2024   Expected End Date: 1/16/2025   Priority: Level 2   Barriers: No Barriers Identified   Note:    Pt states he has been drinking Red Bull to help increase his BG levels when going low recently.  Again, reviewed correct way to treat low BG levels.  Encouraged 4 oz sweet beverage only; then wait 15 mins.  Reviewed number of times and provided another handout.         Task: Reviewed proper treatment of hypoglycemia with the rule of 15--patient to eat 15g simple carbohydrate (4 glucose tablets, 1 glucose gel, 5 pieces hard candy, ½ cup fruit juice, ½ can regular soda, etc) and wait 15 minutes and recheck home glucose.         Task: Reviewed common causes and precautions to help prevent hyper/hypoglycemic events.         Task: Reviewed signs and  symptoms of hyper/hypoglycemia, what range is considered to be hyper/hypoglycemia, and when to seek further medical attention.         Task: Discussed, sick day planning, natural disaster planning, and/or travel planning to prevent hyper/hypoglycemia.         Task: Discussed risk factors for developing diabetic ketoacidosis (DKA), strategies for reducing risk, testing with ketone test strips if BG is >240mg/dl, basic protocol for managing DKA, and when to seek further medical attention.           Follow Up Plan     Follow up in about 2 weeks (around 10/30/2024) for hopefully pt will be sharing [employer] data via Clarity; download report and review with decreased Tresiba that Stacy rec today 14 units (not 18 units)    Today's care plan and follow up schedule was discussed with patient.  Sergio verbalized understanding of the care plan, goals, and agrees to follow up plan.        The patient was encouraged to communicate with his/her health care provider/physician and care team regarding his/her condition(s) and treatment.  I provided the patient with my contact information today and encouraged to contact me via phone or [facility]'s Patient Portal as needed.     Length of Visit   Total Time: 60 Minutes

## 2024-10-16 NOTE — TELEPHONE ENCOUNTER
Joy Sheffield. Looks like the lows have persisted, despite the dose decrease made at last visit. Please advise him to decrease Tresiba to 14 units. Thank you!

## 2024-10-28 ENCOUNTER — PATIENT OUTREACH (OUTPATIENT)
Dept: DIABETES | Facility: CLINIC | Age: 49
End: 2024-10-28
Payer: MEDICAID

## 2024-10-29 ENCOUNTER — PATIENT OUTREACH (OUTPATIENT)
Dept: DIABETES | Facility: CLINIC | Age: 49
End: 2024-10-29
Payer: MEDICAID

## 2024-10-30 ENCOUNTER — TELEPHONE (OUTPATIENT)
Dept: DIABETES | Facility: CLINIC | Age: 49
End: 2024-10-30
Payer: MEDICAID

## 2024-10-30 ENCOUNTER — NUTRITION (OUTPATIENT)
Dept: DIABETES | Facility: CLINIC | Age: 49
End: 2024-10-30
Payer: MEDICAID

## 2024-10-30 DIAGNOSIS — E11.51 TYPE 2 DIABETES MELLITUS WITH DIABETIC PERIPHERAL ANGIOPATHY WITHOUT GANGRENE, WITH LONG-TERM CURRENT USE OF INSULIN: Primary | ICD-10-CM

## 2024-10-30 DIAGNOSIS — Z79.4 TYPE 2 DIABETES MELLITUS WITH DIABETIC PERIPHERAL ANGIOPATHY WITHOUT GANGRENE, WITH LONG-TERM CURRENT USE OF INSULIN: Primary | ICD-10-CM

## 2024-10-30 PROCEDURE — 99999 PR PBB SHADOW E&M-EST. PATIENT-LVL III: CPT | Mod: PBBFAC,,, | Performed by: NUTRITIONIST

## 2024-10-30 PROCEDURE — 99213 OFFICE O/P EST LOW 20 MIN: CPT | Mod: PBBFAC,PO | Performed by: NUTRITIONIST

## 2024-10-30 PROCEDURE — 99999PBSHW PR PBB SHADOW TECHNICAL ONLY FILED TO HB: Mod: PBBFAC,,,

## 2024-10-30 PROCEDURE — G0108 DIAB MANAGE TRN  PER INDIV: HCPCS | Mod: PBBFAC,PO | Performed by: NUTRITIONIST

## 2024-10-31 ENCOUNTER — PATIENT OUTREACH (OUTPATIENT)
Dept: DIABETES | Facility: CLINIC | Age: 49
End: 2024-10-31
Payer: MEDICAID

## 2024-11-13 ENCOUNTER — OFFICE VISIT (OUTPATIENT)
Dept: FAMILY MEDICINE | Facility: CLINIC | Age: 49
End: 2024-11-13
Payer: MEDICAID

## 2024-11-13 VITALS
HEIGHT: 67 IN | RESPIRATION RATE: 20 BRPM | TEMPERATURE: 98 F | BODY MASS INDEX: 17.75 KG/M2 | DIASTOLIC BLOOD PRESSURE: 64 MMHG | SYSTOLIC BLOOD PRESSURE: 92 MMHG | WEIGHT: 113.13 LBS | HEART RATE: 112 BPM

## 2024-11-13 DIAGNOSIS — Z12.11 COLON CANCER SCREENING: ICD-10-CM

## 2024-11-13 DIAGNOSIS — Z91.199 MEDICAL NON-COMPLIANCE: ICD-10-CM

## 2024-11-13 DIAGNOSIS — E11.65 UNCONTROLLED TYPE 2 DIABETES MELLITUS WITH HYPERGLYCEMIA: Primary | ICD-10-CM

## 2024-11-13 DIAGNOSIS — Z11.59 ENCOUNTER FOR HEPATITIS C SCREENING TEST FOR LOW RISK PATIENT: ICD-10-CM

## 2024-11-13 PROCEDURE — 3008F BODY MASS INDEX DOCD: CPT | Mod: CPTII,,, | Performed by: NURSE PRACTITIONER

## 2024-11-13 PROCEDURE — 3066F NEPHROPATHY DOC TX: CPT | Mod: CPTII,,, | Performed by: NURSE PRACTITIONER

## 2024-11-13 PROCEDURE — 1159F MED LIST DOCD IN RCRD: CPT | Mod: CPTII,,, | Performed by: NURSE PRACTITIONER

## 2024-11-13 PROCEDURE — 99213 OFFICE O/P EST LOW 20 MIN: CPT | Mod: S$PBB,,, | Performed by: NURSE PRACTITIONER

## 2024-11-13 PROCEDURE — 99214 OFFICE O/P EST MOD 30 MIN: CPT | Mod: PBBFAC,PN | Performed by: NURSE PRACTITIONER

## 2024-11-13 PROCEDURE — 3078F DIAST BP <80 MM HG: CPT | Mod: CPTII,,, | Performed by: NURSE PRACTITIONER

## 2024-11-13 PROCEDURE — 99999 PR PBB SHADOW E&M-EST. PATIENT-LVL IV: CPT | Mod: PBBFAC,,, | Performed by: NURSE PRACTITIONER

## 2024-11-13 PROCEDURE — 3074F SYST BP LT 130 MM HG: CPT | Mod: CPTII,,, | Performed by: NURSE PRACTITIONER

## 2024-11-13 PROCEDURE — 3046F HEMOGLOBIN A1C LEVEL >9.0%: CPT | Mod: CPTII,,, | Performed by: NURSE PRACTITIONER

## 2024-11-13 PROCEDURE — 3061F NEG MICROALBUMINURIA REV: CPT | Mod: CPTII,,, | Performed by: NURSE PRACTITIONER

## 2024-11-13 RX ORDER — ATORVASTATIN CALCIUM 10 MG/1
10 TABLET, FILM COATED ORAL NIGHTLY
Qty: 30 TABLET | Refills: 3 | Status: SHIPPED | OUTPATIENT
Start: 2024-11-13

## 2024-11-13 NOTE — PROGRESS NOTES
Patient ID: Sergio Porter is a 49 y.o. male.    Chief Complaint: Follow-up (48 yo male here for recheck. Pt states no c/o. KM)    History of Present Illness    CHIEF COMPLAINT:  Mr. Porter presents today for a follow-up.      ROS:  General: -fever, -chills, -fatigue, -weight gain, -weight loss  Eyes: -vision changes, -redness, -discharge  ENT: -ear pain, -nasal congestion, -sore throat  Cardiovascular: -chest pain, -palpitations, -lower extremity edema  Respiratory: -cough, -shortness of breath  Gastrointestinal: -abdominal pain, -nausea, -vomiting, -diarrhea, -constipation, -blood in stool  Genitourinary: -dysuria, -hematuria, -frequency  Musculoskeletal: -joint pain, -muscle pain  Skin: -rash, -lesion  Neurological: -headache, -dizziness, -numbness, -tingling  Psychiatric: -anxiety, -depression, -sleep difficulty             Past Medical History:   Diagnosis Date    COVID-19     around june 2022, per patient/wife    Diabetes mellitus, type 2     Dyslipidemia     Opioid abuse      Past Surgical History:   Procedure Laterality Date    ANGIOGRAM, ABDOMINAL AORTA N/A 1/27/2024    Procedure: Angiogram, Abdominal Aorta;  Surgeon: Bhavesh Pelletier MD;  Location: [facility] CATH/EP LAB;  Service: General;  Laterality: N/A;    ANGIOGRAM, EXTREMITY, BILATERAL Right 1/27/2024    Procedure: ANGIOGRAM, EXTREMITY, BILATERAL;  Surgeon: Bhavesh Pelletier MD;  Location: [facility] CATH/EP LAB;  Service: General;  Laterality: Right;    INCISION AND DRAINAGE OF ABSCESS N/A 9/16/2022    Procedure: INCISION AND DRAINAGE, ABSCESS- BACK;  Surgeon: Glenn Fraser MD;  Location: [facility] OR;  Service: General;  Laterality: N/A;    INTRAVASCULAR ULTRASOUND, NON-CORONARY N/A 1/27/2024    Procedure: Intravascular Ultrasound, Non-Coronary;  Surgeon: Bhavesh Pelletier MD;  Location: [facility] CATH/EP LAB;  Service: General;  Laterality: N/A;    STENTS, ILIAC, BILATERAL Bilateral 1/27/2024    Procedure: Stents, Iliac, Bilateral;  Surgeon: Bhavesh Pelletier  MD;  Location: [facility] CATH/EP LAB;  Service: General;  Laterality: Bilateral;         Tobacco History:  reports that he has been smoking cigarettes. He started smoking about 36 years ago. He has a 36.4 pack-year smoking history. He has been exposed to tobacco smoke. He has never used smokeless tobacco.      Review of patient's allergies indicates:  No Known Allergies    Current Outpatient Medications:     buprenorphine-naloxone 8-2 mg (SUBOXONE) 8-2 mg, Place 1 Film under the tongue once daily. Per patient, Disp: , Rfl:     DEXCOM G7 SENSOR May, 1 Device by Misc.(Non-Drug; Combo Route) route every 10 days., Disp: 9 each, Rfl: 3    gabapentin (NEURONTIN) 300 MG capsule, Take 1 capsule (300 mg total) by mouth 2 (two) times daily., Disp: 60 capsule, Rfl: 11    insulin degludec (TRESIBA FLEXTOUCH U-100) 100 unit/mL (3 mL) insulin pen, Inject 14 Units into the skin once daily., Disp: , Rfl:     metFORMIN (GLUCOPHAGE) 500 MG tablet, Take 2 tablets (1,000 mg total) by mouth 2 (two) times daily with meals., Disp: 120 tablet, Rfl: 3    mirtazapine (REMERON) 15 MG tablet, Take 15 mg by mouth every evening., Disp: , Rfl:     nicotine (NICODERM CQ) 21 mg/24 hr, Place 1 patch onto the skin once daily., Disp: 28 patch, Rfl: 0    sildenafiL (VIAGRA) 25 MG tablet, Take 1 tablet (25 mg total) by mouth daily as needed for Erectile Dysfunction., Disp: 30 tablet, Rfl: 0    SITagliptin phosphate (JANUVIA) 100 MG Tab, Take 1 tablet (100 mg total) by mouth once daily., Disp: 90 tablet, Rfl: 3    atorvastatin (LIPITOR) 10 MG tablet, Take 1 tablet (10 mg total) by mouth every evening., Disp: 30 tablet, Rfl: 3    blood sugar diagnostic Strp, To check BG 3 times daily, to use with insurance preferred meter  Dx E 11.65, Disp: 100 each, Rfl: 1    blood-glucose meter kit, To check BG 3 times daily, to use with insurance preferred meter DX E 11.65, Disp: 1 each, Rfl: 1    clopidogreL (PLAVIX) 75 mg tablet, Take 1 tablet (75 mg total) by mouth once  "daily. (Patient not taking: Reported on 10/3/2024), Disp: 30 tablet, Rfl: 5    lancets Misc, To check BG 3 times daily, to use with insurance preferred meter Dx E 11.65, Disp: 100 each, Rfl: 1    nicotine polacrilex 2 MG Lozg, Take 1 lozenge (2 mg total) by mouth as needed (Use in place of cigarettes/Do not exceed 5 pieces per day)., Disp: 189 lozenge, Rfl: 0    pen needle, diabetic 32 gauge x 5/32" Ndle, 1 Needle by Misc.(Non-Drug; Combo Route) route 4 (four) times daily., Disp: 400 each, Rfl: 3           Objective:      Vitals:    11/13/24 0830   BP: 92/64   Pulse: (!) 112   Resp: 20   Temp: 98 °F (36.7 °C)   TempSrc: Oral   Weight: 51.3 kg (113 lb 1.6 oz)   Height: 5' 7" (1.702 m)     Physical Exam  Constitutional:       Appearance: He is underweight. He is ill-appearing.      Comments: Chronically ill appearing   Pulmonary:      Effort: Pulmonary effort is normal.      Breath sounds: Normal breath sounds.   Musculoskeletal:         General: Normal range of motion.   Skin:     General: Skin is warm.   Neurological:      Mental Status: He is alert and oriented to person, place, and time.           Assessment:       1. Uncontrolled type 2 diabetes mellitus with hyperglycemia    2. Medical non-compliance    3. Colon cancer screening    4. Encounter for hepatitis C screening test for low risk patient           Plan:       Assessment & Plan    Reviewed patient's recent endocrinology visit in September  Assessed blood sugar, noting concern about low morning readings  Evaluated current diabetes medication regimen, including Tresiba, Januvia, and metformin  Considered potential adjustments to medication timing or dosage to address low blood sugar episodes  Noted patient's recent fall due to low blood sugar, highlighting need for medication management  Assessed respiratory status, detecting expected wheezing in lung bases    DIABETES:  Explained that Januvia acts over a 24-hour period, addressing patient's concerns about " immediate blood sugar lowering effects.  Discussed importance of eating to maintain blood sugar, especially when experiencing low readings.  Mr. Porter to eat something before taking diabetes medications when blood sugar is low.  Mr. Porter to monitor blood sugar closely.  Mr. Porter to report any future falls or significant low blood sugar episodes.  Continued Tresiba (insulin degludec) at current dose.  Continued Januvia at current dose.  Continued metformin at current dose.  A1C test ordered to be completed in December.    LABS:  CMP (Comprehensive Metabolic Panel) ordered to be completed in December.  Complete ordered labs (A1C and CMP) by the end of December.    FOLLOW UP:  Follow up after seeing endocrinologist Dr. Kumar on January 8th.  Contact the office if patient experiences another fall or significant low blood sugar episode.         Uncontrolled type 2 diabetes mellitus with hyperglycemia  -     atorvastatin (LIPITOR) 10 MG tablet; Take 1 tablet (10 mg total) by mouth every evening.  Dispense: 30 tablet; Refill: 3    Medical non-compliance  -     atorvastatin (LIPITOR) 10 MG tablet; Take 1 tablet (10 mg total) by mouth every evening.  Dispense: 30 tablet; Refill: 3    Colon cancer screening  -     Cologuard Screening (Multitarget Stool DNA); Future; Expected date: 11/13/2024    Encounter for hepatitis C screening test for low risk patient  -     Hepatitis C Antibody; Future; Expected date: 11/13/2024      Follow up in about 3 months (around 2/13/2025).        11/13/2024 Aletha Henson NP    This note was generated with the assistance of ambient listening technology. Verbal consent was obtained by the patient and accompanying visitor(s) for the recording of patient appointment to facilitate this note. I attest to having reviewed and edited the generated note for accuracy, though some syntax or spelling errors may persist. Please contact the author of this note for any clarification.

## 2024-12-11 ENCOUNTER — TELEPHONE (OUTPATIENT)
Dept: SMOKING CESSATION | Facility: CLINIC | Age: 49
End: 2024-12-11
Payer: MEDICAID

## 2024-12-30 ENCOUNTER — LAB VISIT (OUTPATIENT)
Dept: LAB | Facility: HOSPITAL | Age: 49
End: 2024-12-30
Attending: NURSE PRACTITIONER
Payer: MEDICAID

## 2024-12-30 DIAGNOSIS — Z11.59 ENCOUNTER FOR HEPATITIS C SCREENING TEST FOR LOW RISK PATIENT: ICD-10-CM

## 2024-12-30 LAB — HCV AB SERPL QL IA: NORMAL

## 2024-12-30 PROCEDURE — 36415 COLL VENOUS BLD VENIPUNCTURE: CPT | Performed by: NURSE PRACTITIONER

## 2024-12-30 PROCEDURE — 86803 HEPATITIS C AB TEST: CPT | Performed by: NURSE PRACTITIONER

## 2025-01-03 DIAGNOSIS — E11.65 UNCONTROLLED TYPE 2 DIABETES MELLITUS WITH HYPERGLYCEMIA: Primary | ICD-10-CM

## 2025-01-03 RX ORDER — INSULIN GLARGINE 100 [IU]/ML
15 INJECTION, SOLUTION SUBCUTANEOUS NIGHTLY
Qty: 4.5 ML | Refills: 11 | Status: SHIPPED | OUTPATIENT
Start: 2025-01-03 | End: 2026-01-03

## 2025-01-08 ENCOUNTER — TELEPHONE (OUTPATIENT)
Dept: ENDOCRINOLOGY | Facility: CLINIC | Age: 50
End: 2025-01-08
Payer: MEDICAID

## 2025-01-08 NOTE — TELEPHONE ENCOUNTER
----- Message from Jaycee sent at 1/8/2025 10:16 AM CST -----  Type:  Sooner Appointment Request    Caller is requesting a sooner appointment.  Caller declined first available appointment listed below.  Caller will not accept being placed on the waitlist and is requesting a message be sent to doctor.    Name of Caller:pt    When is the first available appointment?n/a     Would the patient rather a call back or a response via [facility]? Call back     Best Call Back Number:[phone]    Additional Information: pt is wanting to know if he can be seen today still he didn't realize he had an appt today and needs his meds his appt was for 1/8/25 at 9:00 am      Please call back to advise. Thanks!

## 2025-01-08 NOTE — TELEPHONE ENCOUNTER
Pt forgot his appt with MsWiley Emily at 9 AM today. R/S to next avail 1/15/25 at 9 AM in [place]. Pt verb understanding.

## 2025-01-14 ENCOUNTER — PATIENT MESSAGE (OUTPATIENT)
Dept: ADMINISTRATIVE | Facility: HOSPITAL | Age: 50
End: 2025-01-14
Payer: MEDICAID

## 2025-01-30 ENCOUNTER — TELEPHONE (OUTPATIENT)
Dept: SMOKING CESSATION | Facility: CLINIC | Age: 50
End: 2025-01-30
Payer: MEDICAID

## 2025-02-02 ENCOUNTER — HOSPITAL ENCOUNTER (INPATIENT)
Facility: HOSPITAL | Age: 50
LOS: 4 days | Discharge: HOME | DRG: 871 | End: 2025-02-06
Attending: EMERGENCY MEDICINE | Admitting: INTERNAL MEDICINE
Payer: MEDICAID

## 2025-02-02 DIAGNOSIS — E11.65 UNCONTROLLED TYPE 2 DIABETES MELLITUS WITH HYPERGLYCEMIA: ICD-10-CM

## 2025-02-02 DIAGNOSIS — J96.01 ACUTE HYPOXIC RESPIRATORY FAILURE: ICD-10-CM

## 2025-02-02 DIAGNOSIS — J18.9 ACUTE PNEUMONIA: ICD-10-CM

## 2025-02-02 DIAGNOSIS — R07.9 CHEST PAIN: ICD-10-CM

## 2025-02-02 DIAGNOSIS — R09.02 HYPOXIA: Primary | ICD-10-CM

## 2025-02-02 DIAGNOSIS — Z72.0 TOBACCO ABUSE: ICD-10-CM

## 2025-02-02 PROBLEM — J96.02 ACUTE HYPERCAPNIC RESPIRATORY FAILURE: Status: ACTIVE | Noted: 2025-02-02

## 2025-02-02 PROBLEM — I73.9 PVD (PERIPHERAL VASCULAR DISEASE): Status: ACTIVE | Noted: 2025-02-02

## 2025-02-02 LAB
ADENOVIRUS: NOT DETECTED
ALBUMIN SERPL BCP-MCNC: 3.6 G/DL (ref 3.5–5.2)
ALLENS TEST: ABNORMAL
ALP SERPL-CCNC: 135 U/L (ref 55–135)
ALT SERPL W/O P-5'-P-CCNC: 15 U/L (ref 10–44)
ANION GAP SERPL CALC-SCNC: 12 MMOL/L (ref 8–16)
AST SERPL-CCNC: 21 U/L (ref 10–40)
BASOPHILS NFR BLD: 0 % (ref 0–1.9)
BILIRUB SERPL-MCNC: 0.6 MG/DL (ref 0.1–1)
BNP SERPL-MCNC: 275 PG/ML (ref 0–99)
BORDETELLA PARAPERTUSSIS (IS1001): NOT DETECTED
BORDETELLA PERTUSSIS (PTXP): NOT DETECTED
BUN SERPL-MCNC: 21 MG/DL (ref 6–20)
CALCIUM SERPL-MCNC: 9.5 MG/DL (ref 8.7–10.5)
CHLAMYDIA PNEUMONIAE: NOT DETECTED
CHLORIDE SERPL-SCNC: 94 MMOL/L (ref 95–110)
CO2 SERPL-SCNC: 30 MMOL/L (ref 23–29)
CORONAVIRUS 229E, COMMON COLD VIRUS: NOT DETECTED
CORONAVIRUS HKU1, COMMON COLD VIRUS: NOT DETECTED
CORONAVIRUS NL63, COMMON COLD VIRUS: NOT DETECTED
CORONAVIRUS OC43, COMMON COLD VIRUS: NOT DETECTED
CREAT SERPL-MCNC: 0.8 MG/DL (ref 0.5–1.4)
DELSYS: ABNORMAL
DIFFERENTIAL METHOD BLD: ABNORMAL
EOSINOPHIL NFR BLD: 0 % (ref 0–8)
ERYTHROCYTE [DISTWIDTH] IN BLOOD BY AUTOMATED COUNT: 14.9 % (ref 11.5–14.5)
EST. GFR  (NO RACE VARIABLE): >60 ML/MIN/1.73 M^2
FLOW: 6
FLUBV RNA NPH QL NAA+NON-PROBE: NOT DETECTED
GLUCOSE SERPL-MCNC: 252 MG/DL (ref 70–110)
HCO3 UR-SCNC: 33 MMOL/L (ref 24–28)
HCT VFR BLD AUTO: 38.6 % (ref 40–54)
HGB BLD-MCNC: 12.1 G/DL (ref 14–18)
HPIV1 RNA NPH QL NAA+NON-PROBE: NOT DETECTED
HPIV2 RNA NPH QL NAA+NON-PROBE: NOT DETECTED
HPIV3 RNA NPH QL NAA+NON-PROBE: NOT DETECTED
HPIV4 RNA NPH QL NAA+NON-PROBE: NOT DETECTED
HUMAN METAPNEUMOVIRUS: NOT DETECTED
IMM GRANULOCYTES # BLD AUTO: ABNORMAL K/UL (ref 0–0.04)
IMM GRANULOCYTES NFR BLD AUTO: ABNORMAL % (ref 0–0.5)
INFLUENZA A (SUBTYPES H1,H1-2009,H3): NOT DETECTED
INFLUENZA A, MOLECULAR: NEGATIVE
INFLUENZA B, MOLECULAR: NEGATIVE
LDH SERPL L TO P-CCNC: 2.01 MMOL/L (ref 0.5–2.2)
LYMPHOCYTES NFR BLD: 11 % (ref 18–48)
MAGNESIUM SERPL-MCNC: 2 MG/DL (ref 1.6–2.6)
MCH RBC QN AUTO: 25.2 PG (ref 27–31)
MCHC RBC AUTO-ENTMCNC: 31.3 G/DL (ref 32–36)
MCV RBC AUTO: 80 FL (ref 82–98)
MODE: ABNORMAL
MONOCYTES NFR BLD: 19 % (ref 4–15)
MYCOPLASMA PNEUMONIAE: NOT DETECTED
NEUTROPHILS NFR BLD: 52 % (ref 38–73)
NEUTS BAND NFR BLD MANUAL: 18 %
NRBC BLD-RTO: 0 /100 WBC
PCO2 BLDA: 54.9 MMHG (ref 35–45)
PH SMN: 7.39 [PH] (ref 7.35–7.45)
PHOSPHATE SERPL-MCNC: 3.2 MG/DL (ref 2.7–4.5)
PLATELET # BLD AUTO: 272 K/UL (ref 150–450)
PLATELET BLD QL SMEAR: ABNORMAL
PMV BLD AUTO: 9.1 FL (ref 9.2–12.9)
PO2 BLDA: 44 MMHG (ref 40–60)
POC BE: 8 MMOL/L
POC SATURATED O2: 78 % (ref 95–100)
POC TCO2: 35 MMOL/L (ref 24–29)
POCT GLUCOSE: 264 MG/DL (ref 70–110)
POCT GLUCOSE: 319 MG/DL (ref 70–110)
POTASSIUM SERPL-SCNC: 3.8 MMOL/L (ref 3.5–5.1)
PROCALCITONIN SERPL IA-MCNC: 0.17 NG/ML (ref 0–0.5)
PROT SERPL-MCNC: 7.5 G/DL (ref 6–8.4)
RBC # BLD AUTO: 4.81 M/UL (ref 4.6–6.2)
RESPIRATORY INFECTION PANEL SOURCE: NORMAL
RSV RNA NPH QL NAA+NON-PROBE: NOT DETECTED
RV+EV RNA NPH QL NAA+NON-PROBE: NOT DETECTED
SAMPLE: ABNORMAL
SAMPLE: NORMAL
SARS-COV-2 RDRP RESP QL NAA+PROBE: NEGATIVE
SARS-COV-2 RNA RESP QL NAA+PROBE: NOT DETECTED
SITE: ABNORMAL
SODIUM SERPL-SCNC: 136 MMOL/L (ref 136–145)
SPECIMEN SOURCE: NORMAL
TROPONIN I SERPL HS-MCNC: 6 PG/ML (ref 0–14.9)
WBC # BLD AUTO: 13.02 K/UL (ref 3.9–12.7)

## 2025-02-02 PROCEDURE — 36415 COLL VENOUS BLD VENIPUNCTURE: CPT

## 2025-02-02 PROCEDURE — 12000002 HC ACUTE/MED SURGE SEMI-PRIVATE ROOM

## 2025-02-02 PROCEDURE — 99900035 HC TECH TIME PER 15 MIN (STAT)

## 2025-02-02 PROCEDURE — 94640 AIRWAY INHALATION TREATMENT: CPT

## 2025-02-02 PROCEDURE — 85007 BL SMEAR W/DIFF WBC COUNT: CPT

## 2025-02-02 PROCEDURE — 87502 INFLUENZA DNA AMP PROBE: CPT

## 2025-02-02 PROCEDURE — 87389 HIV-1 AG W/HIV-1&-2 AB AG IA: CPT | Performed by: EMERGENCY MEDICINE

## 2025-02-02 PROCEDURE — 83735 ASSAY OF MAGNESIUM: CPT

## 2025-02-02 PROCEDURE — 99285 EMERGENCY DEPT VISIT HI MDM: CPT | Mod: 25

## 2025-02-02 PROCEDURE — 25000242 PHARM REV CODE 250 ALT 637 W/ HCPCS: Performed by: EMERGENCY MEDICINE

## 2025-02-02 PROCEDURE — 84145 PROCALCITONIN (PCT): CPT | Performed by: EMERGENCY MEDICINE

## 2025-02-02 PROCEDURE — 96375 TX/PRO/DX INJ NEW DRUG ADDON: CPT

## 2025-02-02 PROCEDURE — 85027 COMPLETE CBC AUTOMATED: CPT

## 2025-02-02 PROCEDURE — 63600175 PHARM REV CODE 636 W HCPCS: Mod: JZ,TB | Performed by: EMERGENCY MEDICINE

## 2025-02-02 PROCEDURE — 96365 THER/PROPH/DIAG IV INF INIT: CPT

## 2025-02-02 PROCEDURE — 93005 ELECTROCARDIOGRAM TRACING: CPT | Performed by: INTERNAL MEDICINE

## 2025-02-02 PROCEDURE — 87633 RESP VIRUS 12-25 TARGETS: CPT | Performed by: EMERGENCY MEDICINE

## 2025-02-02 PROCEDURE — 87635 SARS-COV-2 COVID-19 AMP PRB: CPT

## 2025-02-02 PROCEDURE — 84484 ASSAY OF TROPONIN QUANT: CPT

## 2025-02-02 PROCEDURE — 25000242 PHARM REV CODE 250 ALT 637 W/ HCPCS

## 2025-02-02 PROCEDURE — 96361 HYDRATE IV INFUSION ADD-ON: CPT

## 2025-02-02 PROCEDURE — 82962 GLUCOSE BLOOD TEST: CPT

## 2025-02-02 PROCEDURE — 93010 ELECTROCARDIOGRAM REPORT: CPT | Mod: ,,, | Performed by: INTERNAL MEDICINE

## 2025-02-02 PROCEDURE — 63600175 PHARM REV CODE 636 W HCPCS: Performed by: INTERNAL MEDICINE

## 2025-02-02 PROCEDURE — 36415 COLL VENOUS BLD VENIPUNCTURE: CPT | Performed by: EMERGENCY MEDICINE

## 2025-02-02 PROCEDURE — 5A0935A ASSISTANCE WITH RESPIRATORY VENTILATION, LESS THAN 24 CONSECUTIVE HOURS, HIGH NASAL FLOW/VELOCITY: ICD-10-PCS | Performed by: INTERNAL MEDICINE

## 2025-02-02 PROCEDURE — 84100 ASSAY OF PHOSPHORUS: CPT

## 2025-02-02 PROCEDURE — 25000242 PHARM REV CODE 250 ALT 637 W/ HCPCS: Performed by: INTERNAL MEDICINE

## 2025-02-02 PROCEDURE — 99900031 HC PATIENT EDUCATION (STAT)

## 2025-02-02 PROCEDURE — 80053 COMPREHEN METABOLIC PANEL: CPT

## 2025-02-02 PROCEDURE — 25000003 PHARM REV CODE 250: Performed by: EMERGENCY MEDICINE

## 2025-02-02 PROCEDURE — 83880 ASSAY OF NATRIURETIC PEPTIDE: CPT | Performed by: EMERGENCY MEDICINE

## 2025-02-02 PROCEDURE — 27000221 HC OXYGEN, UP TO 24 HOURS

## 2025-02-02 PROCEDURE — 87040 BLOOD CULTURE FOR BACTERIA: CPT | Mod: 59

## 2025-02-02 PROCEDURE — 94761 N-INVAS EAR/PLS OXIMETRY MLT: CPT

## 2025-02-02 PROCEDURE — 25000003 PHARM REV CODE 250: Performed by: INTERNAL MEDICINE

## 2025-02-02 RX ORDER — SODIUM,POTASSIUM PHOSPHATES 280-250MG
2 POWDER IN PACKET (EA) ORAL
Status: DISCONTINUED | OUTPATIENT
Start: 2025-02-02 | End: 2025-02-06 | Stop reason: HOSPADM

## 2025-02-02 RX ORDER — LANOLIN ALCOHOL/MO/W.PET/CERES
800 CREAM (GRAM) TOPICAL
Status: DISCONTINUED | OUTPATIENT
Start: 2025-02-02 | End: 2025-02-06 | Stop reason: HOSPADM

## 2025-02-02 RX ORDER — HYDROCODONE BITARTRATE AND ACETAMINOPHEN 5; 325 MG/1; MG/1
1 TABLET ORAL EVERY 6 HOURS PRN
Status: DISCONTINUED | OUTPATIENT
Start: 2025-02-02 | End: 2025-02-06 | Stop reason: HOSPADM

## 2025-02-02 RX ORDER — FAMOTIDINE 20 MG/1
20 TABLET, FILM COATED ORAL 2 TIMES DAILY
Status: DISCONTINUED | OUTPATIENT
Start: 2025-02-02 | End: 2025-02-06 | Stop reason: HOSPADM

## 2025-02-02 RX ORDER — LEVALBUTEROL INHALATION SOLUTION 1.25 MG/3ML
1.25 SOLUTION RESPIRATORY (INHALATION) EVERY 8 HOURS
Status: DISCONTINUED | OUTPATIENT
Start: 2025-02-03 | End: 2025-02-06 | Stop reason: HOSPADM

## 2025-02-02 RX ORDER — IPRATROPIUM BROMIDE AND ALBUTEROL SULFATE 2.5; .5 MG/3ML; MG/3ML
3 SOLUTION RESPIRATORY (INHALATION)
Status: COMPLETED | OUTPATIENT
Start: 2025-02-02 | End: 2025-02-02

## 2025-02-02 RX ORDER — INSULIN GLARGINE 100 [IU]/ML
15 INJECTION, SOLUTION SUBCUTANEOUS NIGHTLY
Status: DISCONTINUED | OUTPATIENT
Start: 2025-02-02 | End: 2025-02-03

## 2025-02-02 RX ORDER — INSULIN ASPART 100 [IU]/ML
0-10 INJECTION, SOLUTION INTRAVENOUS; SUBCUTANEOUS
Status: DISCONTINUED | OUTPATIENT
Start: 2025-02-02 | End: 2025-02-03

## 2025-02-02 RX ORDER — HYDRALAZINE HYDROCHLORIDE 20 MG/ML
10 INJECTION INTRAMUSCULAR; INTRAVENOUS EVERY 4 HOURS PRN
Status: DISCONTINUED | OUTPATIENT
Start: 2025-02-02 | End: 2025-02-03

## 2025-02-02 RX ORDER — ATORVASTATIN CALCIUM 10 MG/1
10 TABLET, FILM COATED ORAL NIGHTLY
Status: DISCONTINUED | OUTPATIENT
Start: 2025-02-02 | End: 2025-02-06 | Stop reason: HOSPADM

## 2025-02-02 RX ORDER — SODIUM CHLORIDE 9 MG/ML
INJECTION, SOLUTION INTRAVENOUS CONTINUOUS
Status: DISCONTINUED | OUTPATIENT
Start: 2025-02-02 | End: 2025-02-05

## 2025-02-02 RX ORDER — ACETAMINOPHEN 325 MG/1
650 TABLET ORAL EVERY 4 HOURS PRN
Status: DISCONTINUED | OUTPATIENT
Start: 2025-02-02 | End: 2025-02-06 | Stop reason: HOSPADM

## 2025-02-02 RX ORDER — METHYLPREDNISOLONE SOD SUCC 125 MG
125 VIAL (EA) INJECTION
Status: COMPLETED | OUTPATIENT
Start: 2025-02-02 | End: 2025-02-02

## 2025-02-02 RX ORDER — IBUPROFEN 200 MG
24 TABLET ORAL
Status: DISCONTINUED | OUTPATIENT
Start: 2025-02-02 | End: 2025-02-06 | Stop reason: HOSPADM

## 2025-02-02 RX ORDER — IPRATROPIUM BROMIDE 0.5 MG/2.5ML
0.5 SOLUTION RESPIRATORY (INHALATION) EVERY 8 HOURS
Status: DISCONTINUED | OUTPATIENT
Start: 2025-02-02 | End: 2025-02-06 | Stop reason: HOSPADM

## 2025-02-02 RX ORDER — ACETAMINOPHEN 325 MG/1
650 TABLET ORAL EVERY 8 HOURS PRN
Status: DISCONTINUED | OUTPATIENT
Start: 2025-02-02 | End: 2025-02-02

## 2025-02-02 RX ORDER — ONDANSETRON HYDROCHLORIDE 2 MG/ML
4 INJECTION, SOLUTION INTRAVENOUS EVERY 6 HOURS PRN
Status: DISCONTINUED | OUTPATIENT
Start: 2025-02-02 | End: 2025-02-06 | Stop reason: HOSPADM

## 2025-02-02 RX ORDER — CLOPIDOGREL BISULFATE 75 MG/1
75 TABLET ORAL DAILY
Status: DISCONTINUED | OUTPATIENT
Start: 2025-02-03 | End: 2025-02-06 | Stop reason: HOSPADM

## 2025-02-02 RX ORDER — NALOXONE HCL 0.4 MG/ML
0.02 VIAL (ML) INJECTION
Status: DISCONTINUED | OUTPATIENT
Start: 2025-02-02 | End: 2025-02-06 | Stop reason: HOSPADM

## 2025-02-02 RX ORDER — BUDESONIDE 0.5 MG/2ML
0.5 INHALANT ORAL EVERY 12 HOURS
Status: DISCONTINUED | OUTPATIENT
Start: 2025-02-02 | End: 2025-02-06 | Stop reason: HOSPADM

## 2025-02-02 RX ORDER — CEFEPIME HYDROCHLORIDE 2 G/1
2 INJECTION, POWDER, FOR SOLUTION INTRAVENOUS
Status: DISCONTINUED | OUTPATIENT
Start: 2025-02-02 | End: 2025-02-05

## 2025-02-02 RX ORDER — TALC
6 POWDER (GRAM) TOPICAL NIGHTLY PRN
Status: DISCONTINUED | OUTPATIENT
Start: 2025-02-02 | End: 2025-02-06 | Stop reason: HOSPADM

## 2025-02-02 RX ORDER — ALUMINUM HYDROXIDE, MAGNESIUM HYDROXIDE, AND SIMETHICONE 1200; 120; 1200 MG/30ML; MG/30ML; MG/30ML
30 SUSPENSION ORAL 4 TIMES DAILY PRN
Status: DISCONTINUED | OUTPATIENT
Start: 2025-02-02 | End: 2025-02-06 | Stop reason: HOSPADM

## 2025-02-02 RX ORDER — LEVALBUTEROL INHALATION SOLUTION 1.25 MG/3ML
SOLUTION RESPIRATORY (INHALATION)
Status: COMPLETED
Start: 2025-02-02 | End: 2025-02-02

## 2025-02-02 RX ORDER — GLUCAGON 1 MG
1 KIT INJECTION
Status: DISCONTINUED | OUTPATIENT
Start: 2025-02-02 | End: 2025-02-06 | Stop reason: HOSPADM

## 2025-02-02 RX ORDER — ENOXAPARIN SODIUM 100 MG/ML
40 INJECTION SUBCUTANEOUS EVERY 24 HOURS
Status: DISCONTINUED | OUTPATIENT
Start: 2025-02-02 | End: 2025-02-06 | Stop reason: HOSPADM

## 2025-02-02 RX ORDER — IBUPROFEN 200 MG
16 TABLET ORAL
Status: DISCONTINUED | OUTPATIENT
Start: 2025-02-02 | End: 2025-02-06 | Stop reason: HOSPADM

## 2025-02-02 RX ADMIN — LEVALBUTEROL HYDROCHLORIDE 1.25 MG: 1.25 SOLUTION RESPIRATORY (INHALATION) at 03:02

## 2025-02-02 RX ADMIN — ATORVASTATIN CALCIUM 10 MG: 10 TABLET, FILM COATED ORAL at 08:02

## 2025-02-02 RX ADMIN — IPRATROPIUM BROMIDE 0.5 MG: 0.5 SOLUTION RESPIRATORY (INHALATION) at 07:02

## 2025-02-02 RX ADMIN — SODIUM CHLORIDE 1000 ML: 9 INJECTION, SOLUTION INTRAVENOUS at 04:02

## 2025-02-02 RX ADMIN — LEVALBUTEROL HYDROCHLORIDE 1.25 MG: 1.25 SOLUTION RESPIRATORY (INHALATION) at 11:02

## 2025-02-02 RX ADMIN — FAMOTIDINE 20 MG: 20 TABLET ORAL at 08:02

## 2025-02-02 RX ADMIN — ENOXAPARIN SODIUM 40 MG: 40 INJECTION SUBCUTANEOUS at 07:02

## 2025-02-02 RX ADMIN — INSULIN GLARGINE 15 UNITS: 100 INJECTION, SOLUTION SUBCUTANEOUS at 10:02

## 2025-02-02 RX ADMIN — SODIUM CHLORIDE 1000 MG: 9 INJECTION, SOLUTION INTRAVENOUS at 07:02

## 2025-02-02 RX ADMIN — CEFEPIME 2 G: 2 INJECTION, POWDER, FOR SOLUTION INTRAVENOUS at 07:02

## 2025-02-02 RX ADMIN — SODIUM CHLORIDE: 9 INJECTION, SOLUTION INTRAVENOUS at 09:02

## 2025-02-02 RX ADMIN — PIPERACILLIN SODIUM AND TAZOBACTAM SODIUM 4.5 G: 4; .5 INJECTION, POWDER, LYOPHILIZED, FOR SOLUTION INTRAVENOUS at 06:02

## 2025-02-02 RX ADMIN — BUDESONIDE INHALATION 0.5 MG: 0.5 SUSPENSION RESPIRATORY (INHALATION) at 07:02

## 2025-02-02 RX ADMIN — METHYLPREDNISOLONE SODIUM SUCCINATE 125 MG: 125 INJECTION, POWDER, FOR SOLUTION INTRAMUSCULAR; INTRAVENOUS at 06:02

## 2025-02-02 RX ADMIN — IPRATROPIUM BROMIDE 0.5 MG: 0.5 SOLUTION RESPIRATORY (INHALATION) at 11:02

## 2025-02-02 RX ADMIN — IPRATROPIUM BROMIDE AND ALBUTEROL SULFATE 3 ML: .5; 3 SOLUTION RESPIRATORY (INHALATION) at 05:02

## 2025-02-02 NOTE — RESPIRATORY THERAPY
02/02/25 1546   Patient Assessment/Suction   Level of Consciousness (AVPU) alert   Respiratory Effort Short of breath   Expansion/Accessory Muscles/Retractions no use of accessory muscles   All Lung Fields Breath Sounds crackles, coarse   PRE-TX-O2   Device (Oxygen Therapy) high flow nasal cannula   Flow (L/min) (Oxygen Therapy) 10   SpO2 98 %   Pulse Oximetry Type Continuous   Pulse (!) 119   Resp (!) 32   Aerosol Therapy   $ Aerosol Therapy Charges Aerosol Treatment   Daily Review of Necessity (SVN) completed   Respiratory Treatment Status (SVN) given   Treatment Route (SVN) oxygen;mouthpiece utilized   Patient Position [place]'s   Post Treatment Assessment (SVN) increased aeration   Signs of Intolerance (SVN) none   Breath Sounds Post-Respiratory Treatment   Throughout All Fields Post-Treatment All Fields   Throughout All Fields Post-Treatment aeration increased   Post-treatment Heart Rate (beats/min) 119   Post-treatment Resp Rate (breaths/min) 27

## 2025-02-02 NOTE — ED PROVIDER NOTES
Encounter Date: 2/2/2025       History     Chief Complaint   Patient presents with    Cough    Shortness of Breath     49-year-old male with a past medical history of diabetes mellitus, dyslipidemia, opiate abuse presents for evaluation of shortness of breath.  He reports it started a few days ago and has been persistent since that time.  He reports an associated cough and laryngitis.  He denies any associated nausea/vomiting, abdominal pain, chest pain, sore throat, or diarrhea.  The patient reports that he does smoke approximately 1 pack of cigarettes a day for the last 30 years.  There are no alleviating or aggravating factors.      Review of patient's allergies indicates:  No Known Allergies  Past Medical History:   Diagnosis Date    COVID-19     around june 2022, per patient/wife    Diabetes mellitus, type 2     Dyslipidemia     Opioid abuse      Past Surgical History:   Procedure Laterality Date    ANGIOGRAM, ABDOMINAL AORTA N/A 1/27/2024    Procedure: Angiogram, Abdominal Aorta;  Surgeon: Bhavesh Pelletier MD;  Location: [facility] CATH/EP LAB;  Service: General;  Laterality: N/A;    ANGIOGRAM, EXTREMITY, BILATERAL Right 1/27/2024    Procedure: ANGIOGRAM, EXTREMITY, BILATERAL;  Surgeon: Bhavesh Pelletier MD;  Location: [facility] CATH/EP LAB;  Service: General;  Laterality: Right;    INCISION AND DRAINAGE OF ABSCESS N/A 9/16/2022    Procedure: INCISION AND DRAINAGE, ABSCESS- BACK;  Surgeon: Glenn Fraser MD;  Location: [facility];  Service: General;  Laterality: N/A;    INTRAVASCULAR ULTRASOUND, NON-CORONARY N/A 1/27/2024    Procedure: Intravascular Ultrasound, Non-Coronary;  Surgeon: Bhavesh Pelletier MD;  Location: [facility] CATH/EP LAB;  Service: General;  Laterality: N/A;    STENTS, ILIAC, BILATERAL Bilateral 1/27/2024    Procedure: Stents, Iliac, Bilateral;  Surgeon: Bhavesh Pelletier MD;  Location: [facility] CATH/EP LAB;  Service: General;  Laterality: Bilateral;     Family History   Problem Relation Name Age of Onset     Diabetes Mother      Hypertension Father      Heart disease Father      Diabetes Father      Diabetes Maternal Grandmother      Diabetes Paternal Grandmother       Social History     Tobacco Use    Smoking status: Every Day     Current packs/day: 1.00     Average packs/day: 1 pack/day for 37.1 years (36.6 ttl pk-yrs)     Types: Cigarettes     Start date: 1988     Passive exposure: Current    Smokeless tobacco: Never   Substance Use Topics    Alcohol use: Not Currently    Drug use: Not Currently     Types: Marijuana, Other-see comments     Comment: pain medications/opioids     Review of Systems   Constitutional:  Negative for chills, diaphoresis, fatigue and fever.   HENT:  Positive for congestion. Negative for rhinorrhea.    Respiratory:  Positive for cough and shortness of breath.    Cardiovascular:  Negative for chest pain.   Gastrointestinal:  Negative for abdominal pain, diarrhea, nausea and vomiting.   Genitourinary:  Negative for dysuria, frequency and testicular pain.   Musculoskeletal:  Negative for gait problem.   Skin:  Negative for color change.   Neurological:  Negative for dizziness and numbness.   Psychiatric/Behavioral:  Negative for agitation and confusion.        Physical Exam     Initial Vitals [02/02/25 1516]   BP Pulse Resp Temp SpO2   130/71 (!) 124 (!) 24 99.7 °F (37.6 °C) (!) 79 %      MAP       --         Physical Exam    Nursing note and vitals reviewed.  Constitutional: He appears well-developed and well-nourished.   HENT:   Head: Normocephalic and atraumatic.   Eyes: EOM are normal. Pupils are equal, round, and reactive to light.   Neck: Neck supple.   Cardiovascular:  Regular rhythm.           Tachycardic   Pulmonary/Chest: He has rales.   Coarse breath sounds noted bilaterally.    Tachypneic.   Abdominal: Abdomen is soft. Bowel sounds are normal. He exhibits no distension. There is no abdominal tenderness. There is no rebound and no guarding.   Musculoskeletal:         General: Normal  range of motion.      Cervical back: Neck supple.     Neurological: He is alert and oriented to person, place, and time.   Skin: Skin is warm and dry.   Psychiatric: He has a normal mood and affect.         ED Course   Critical Care    Date/Time: 2/2/2025 5:46 PM    Performed by: Sergio Prado MD  Authorized by: Sergio Prado MD  Direct patient critical care time: 18 minutes  Ordering / reviewing critical care time: 16 minutes  Documentation critical care time: 17 minutes  Total critical care time (exclusive of procedural time) : 51 minutes  Critical care was time spent personally by me on the following activities: development of treatment plan with patient or surrogate, evaluation of patient's response to treatment, examination of patient, obtaining history from patient or surrogate, ordering and performing treatments and interventions, ordering and review of laboratory studies and ordering and review of radiographic studies.        Labs Reviewed   CBC W/ AUTO DIFFERENTIAL - Abnormal       Result Value    WBC 13.02 (*)     RBC 4.81      Hemoglobin 12.1 (*)     Hematocrit 38.6 (*)     MCV 80 (*)     MCH 25.2 (*)     MCHC 31.3 (*)     RDW 14.9 (*)     Platelets 272      MPV 9.1 (*)     Narrative:     Release to patient->Immediate   COMPREHENSIVE METABOLIC PANEL - Abnormal    Sodium 136      Potassium 3.8      Chloride 94 (*)     CO2 30 (*)     Glucose 252 (*)     BUN 21 (*)     Creatinine 0.8      Calcium 9.5      Total Protein 7.5      Albumin 3.6      Total Bilirubin 0.6      Alkaline Phosphatase 135      AST 21      ALT 15      eGFR >60.0      Anion Gap 12      Narrative:     Release to patient->Immediate   B-TYPE NATRIURETIC PEPTIDE - Abnormal     (*)    POCT GLUCOSE - Abnormal    POCT Glucose 264 (*)    ISTAT PROCEDURE - Abnormal    POC PH 7.388      POC PCO2 54.9 (*)     POC PO2 44      POC HCO3 33.0 (*)     POC BE 8 (*)     POC SATURATED O2 78      POC TCO2 35 (*)     Sample VENOUS      Site  Other      Allens Test N/A      DelSys Nasal Can      Mode SPONT      Flow 6     RESPIRATORY INFECTION PANEL (PCR), NASOPHARYNGEAL    Respiratory Infection Panel Source NP swab      Narrative:     Respiratory Infection Panel source->NP Swab   CULTURE, BLOOD   CULTURE, BLOOD   MAGNESIUM    Magnesium 2.0      Narrative:     Release to patient->Immediate   PHOSPHORUS    Phosphorus 3.2      Narrative:     Release to patient->Immediate   SARS-COV-2 RNA AMPLIFICATION, QUAL    SARS-CoV-2 RNA, Amplification, Qual Negative     INFLUENZA A AND B ANTIGEN    Influenza A, Molecular Negative      Influenza B, Molecular Negative      Flu A & B Source Nasal swab      Narrative:     Specimen Source->Nasopharyngeal Swab   PROCALCITONIN   TROPONIN I HIGH SENSITIVITY    Troponin I High Sensitivity 6.0      Narrative:     Release to patient->Immediate   PROCALCITONIN    Procalcitonin 0.168     B-TYPE NATRIURETIC PEPTIDE   HIV 1 / 2 ANTIBODY   URINALYSIS, REFLEX TO URINE CULTURE   LACTIC ACID, PLASMA   ISTAT LACTATE    POC Lactate 2.01      Sample VENOUS     POCT LACTATE   POCT GLUCOSE MONITORING CONTINUOUS     EKG Readings: (Independently Interpreted)   Initial Reading: No STEMI. Rhythm: Sinus Tachycardia. Heart Rate: 116. Ectopy: No Ectopy. Conduction: Normal. ST Segments: Normal ST Segments. T Waves: Normal. Axis: Right Axis Deviation. Clinical Impression: Sinus Tachycardia       Imaging Results              CT Chest Without Contrast (In process)                      X-Ray Chest AP Portable (Final result)  Result time 02/02/25 15:55:33   Procedure changed from X-Ray Chest 1 View     Final result by Paul Davenport DO (02/02/25 15:55:33)                   Impression:      No acute cardiopulmonary abnormality.      Electronically signed by: Paul Davenport  Date:    02/02/2025  Time:    15:55               Narrative:    EXAMINATION:  XR CHEST AP PORTABLE    CLINICAL HISTORY:  Sepsis;    FINDINGS:  Portable chest with comparison chest  x-ray 01/26/2024.  Normal cardiomediastinal silhouette.Lungs are clear. Pulmonary vasculature is normal. No acute osseous abnormality.                                       Medications   methylPREDNISolone sodium succinate injection 125 mg (has no administration in time range)   piperacillin-tazobactam (ZOSYN) 4.5 g in D5W 100 mL IVPB (MB+) (has no administration in time range)   vancomycin (VANCOCIN) 1,000 mg in 0.9% NaCl 250 mL IVPB (admixture device) (has no administration in time range)   levalbuterol (XOPENEX) 1.25 mg/3 mL nebulizer solution (1.25 mg  Given 2/2/25 1546)   sodium chloride 0.9% bolus 1,000 mL 1,000 mL (0 mLs Intravenous Stopped 2/2/25 1751)   albuterol-ipratropium 2.5 mg-0.5 mg/3 mL nebulizer solution 3 mL (3 mLs Nebulization Given 2/2/25 1748)     Medical Decision Making  49-year-old male presents for shortness of breath.    Initial differential diagnosis included but not limited to pneumonia, sepsis, and viral illness.    Amount and/or Complexity of Data Reviewed  Labs: ordered.  Radiology: ordered.    Risk  Prescription drug management.  Risk Details: The patient was emergently evaluated in the emergency department, his evaluation was significant for a middle-age male who is tachypneic and tachycardic.  Additionally, the patient does have coarse breath sounds noted bilaterally.  The patient's EKG showed no acute abnormalities per my independent interpretation.  The patient's chest x-ray showed no acute abnormalities per Radiology.  The patient's labs were significant for a mildly elevated white blood cell count, a normal troponin, and negative COVID and flu swabs.  The patient was treated here with IV fluids, a respiratory nebulizer treatment, with improvement in his symptoms.  I did obtain a CT scan of the patient's chest and a respiratory viral panel swab, along with starting the patient on IV steroids and IV antibiotics.  Clinically the patient sounds like he has a pneumonia, and may have  some underlying undiagnosed lung disease, secondary to his chronic cigarette usage.  The patient was noted to be hypoxic on room air in his requiring nasal cannula oxygen.  He will be admitted to the hospitalist service for further care.  The case was discussed with the hospitalist on-call, Dr. Azevedo.  He has accepted the patient for admission.                                      Clinical Impression:  Final diagnoses:  [R09.02] Hypoxia (Primary)          ED Disposition Condition    Observation Stable                Sergio Prado MD  02/02/25 2937

## 2025-02-03 PROBLEM — F11.90 OPIATE USE: Status: ACTIVE | Noted: 2025-02-03

## 2025-02-03 PROBLEM — R00.0 SINUS TACHYCARDIA: Status: ACTIVE | Noted: 2025-02-03

## 2025-02-03 PROBLEM — I16.0 HYPERTENSIVE URGENCY: Status: ACTIVE | Noted: 2025-02-03

## 2025-02-03 LAB
ALBUMIN SERPL BCP-MCNC: 3.4 G/DL (ref 3.5–5.2)
ALP SERPL-CCNC: 117 U/L (ref 55–135)
ALT SERPL W/O P-5'-P-CCNC: 15 U/L (ref 10–44)
ANION GAP SERPL CALC-SCNC: 10 MMOL/L (ref 8–16)
AST SERPL-CCNC: 18 U/L (ref 10–40)
BACTERIA #/AREA URNS HPF: ABNORMAL /HPF
BASOPHILS # BLD AUTO: ABNORMAL K/UL (ref 0–0.2)
BASOPHILS NFR BLD: 0 % (ref 0–1.9)
BILIRUB SERPL-MCNC: 0.5 MG/DL (ref 0.1–1)
BILIRUB UR QL STRIP: NEGATIVE
BUN SERPL-MCNC: 17 MG/DL (ref 6–20)
CALCIUM SERPL-MCNC: 9 MG/DL (ref 8.7–10.5)
CHLORIDE SERPL-SCNC: 97 MMOL/L (ref 95–110)
CLARITY UR: CLEAR
CO2 SERPL-SCNC: 28 MMOL/L (ref 23–29)
COLOR UR: YELLOW
CREAT SERPL-MCNC: 0.8 MG/DL (ref 0.5–1.4)
DIFFERENTIAL METHOD BLD: ABNORMAL
EOSINOPHIL # BLD AUTO: ABNORMAL K/UL (ref 0–0.5)
EOSINOPHIL NFR BLD: 0 % (ref 0–8)
ERYTHROCYTE [DISTWIDTH] IN BLOOD BY AUTOMATED COUNT: 15.1 % (ref 11.5–14.5)
EST. GFR  (NO RACE VARIABLE): >60 ML/MIN/1.73 M^2
GLUCOSE SERPL-MCNC: 391 MG/DL (ref 70–110)
GLUCOSE SERPL-MCNC: 466 MG/DL (ref 70–110)
GLUCOSE UR QL STRIP: ABNORMAL
HCT VFR BLD AUTO: 36.7 % (ref 40–54)
HGB BLD-MCNC: 11.3 G/DL (ref 14–18)
HGB UR QL STRIP: ABNORMAL
HIV 1+2 AB+HIV1 P24 AG SERPL QL IA: NEGATIVE
HYALINE CASTS #/AREA URNS LPF: 0 /LPF
IMM GRANULOCYTES # BLD AUTO: ABNORMAL K/UL (ref 0–0.04)
IMM GRANULOCYTES NFR BLD AUTO: ABNORMAL % (ref 0–0.5)
KETONES UR QL STRIP: ABNORMAL
LACTATE SERPL-SCNC: 1.9 MMOL/L (ref 0.5–1.9)
LEUKOCYTE ESTERASE UR QL STRIP: NEGATIVE
LYMPHOCYTES # BLD AUTO: ABNORMAL K/UL (ref 1–4.8)
LYMPHOCYTES NFR BLD: 4 % (ref 18–48)
MAGNESIUM SERPL-MCNC: 2.1 MG/DL (ref 1.6–2.6)
MCH RBC QN AUTO: 24.9 PG (ref 27–31)
MCHC RBC AUTO-ENTMCNC: 30.8 G/DL (ref 32–36)
MCV RBC AUTO: 81 FL (ref 82–98)
MICROSCOPIC COMMENT: ABNORMAL
MONOCYTES # BLD AUTO: ABNORMAL K/UL (ref 0.3–1)
MONOCYTES NFR BLD: 0 % (ref 4–15)
NEUTROPHILS NFR BLD: 84 % (ref 38–73)
NEUTS BAND NFR BLD MANUAL: 12 %
NITRITE UR QL STRIP: NEGATIVE
NRBC BLD-RTO: 0 /100 WBC
PH UR STRIP: 6 [PH] (ref 5–8)
PLATELET # BLD AUTO: 272 K/UL (ref 150–450)
PLATELET BLD QL SMEAR: ABNORMAL
PMV BLD AUTO: 8.8 FL (ref 9.2–12.9)
POCT GLUCOSE: 152 MG/DL (ref 70–110)
POCT GLUCOSE: 273 MG/DL (ref 70–110)
POCT GLUCOSE: 325 MG/DL (ref 70–110)
POCT GLUCOSE: 365 MG/DL (ref 70–110)
POCT GLUCOSE: 399 MG/DL (ref 70–110)
POCT GLUCOSE: 457 MG/DL (ref 70–110)
POTASSIUM SERPL-SCNC: 4.1 MMOL/L (ref 3.5–5.1)
PROCALCITONIN SERPL IA-MCNC: 0.26 NG/ML (ref 0–0.5)
PROT SERPL-MCNC: 7 G/DL (ref 6–8.4)
PROT UR QL STRIP: ABNORMAL
RBC # BLD AUTO: 4.53 M/UL (ref 4.6–6.2)
RBC #/AREA URNS HPF: 22 /HPF (ref 0–4)
SODIUM SERPL-SCNC: 135 MMOL/L (ref 136–145)
SP GR UR STRIP: 1.02 (ref 1–1.03)
URN SPEC COLLECT METH UR: ABNORMAL
UROBILINOGEN UR STRIP-ACNC: NEGATIVE EU/DL
WBC # BLD AUTO: 9.83 K/UL (ref 3.9–12.7)
WBC #/AREA URNS HPF: 2 /HPF (ref 0–5)
YEAST URNS QL MICRO: ABNORMAL

## 2025-02-03 PROCEDURE — 83735 ASSAY OF MAGNESIUM: CPT | Performed by: INTERNAL MEDICINE

## 2025-02-03 PROCEDURE — 81001 URINALYSIS AUTO W/SCOPE: CPT

## 2025-02-03 PROCEDURE — 25000003 PHARM REV CODE 250: Performed by: INTERNAL MEDICINE

## 2025-02-03 PROCEDURE — 83036 HEMOGLOBIN GLYCOSYLATED A1C: CPT

## 2025-02-03 PROCEDURE — 94761 N-INVAS EAR/PLS OXIMETRY MLT: CPT

## 2025-02-03 PROCEDURE — 99900031 HC PATIENT EDUCATION (STAT)

## 2025-02-03 PROCEDURE — 63600175 PHARM REV CODE 636 W HCPCS

## 2025-02-03 PROCEDURE — 94640 AIRWAY INHALATION TREATMENT: CPT

## 2025-02-03 PROCEDURE — 85007 BL SMEAR W/DIFF WBC COUNT: CPT | Performed by: INTERNAL MEDICINE

## 2025-02-03 PROCEDURE — 84145 PROCALCITONIN (PCT): CPT | Performed by: INTERNAL MEDICINE

## 2025-02-03 PROCEDURE — 63600175 PHARM REV CODE 636 W HCPCS: Performed by: HOSPITALIST

## 2025-02-03 PROCEDURE — 83605 ASSAY OF LACTIC ACID: CPT | Performed by: INTERNAL MEDICINE

## 2025-02-03 PROCEDURE — 27000221 HC OXYGEN, UP TO 24 HOURS

## 2025-02-03 PROCEDURE — 36415 COLL VENOUS BLD VENIPUNCTURE: CPT

## 2025-02-03 PROCEDURE — 82947 ASSAY GLUCOSE BLOOD QUANT: CPT | Performed by: INTERNAL MEDICINE

## 2025-02-03 PROCEDURE — 85027 COMPLETE CBC AUTOMATED: CPT | Performed by: INTERNAL MEDICINE

## 2025-02-03 PROCEDURE — 87205 SMEAR GRAM STAIN: CPT

## 2025-02-03 PROCEDURE — 99900035 HC TECH TIME PER 15 MIN (STAT)

## 2025-02-03 PROCEDURE — 94799 UNLISTED PULMONARY SVC/PX: CPT

## 2025-02-03 PROCEDURE — 25000242 PHARM REV CODE 250 ALT 637 W/ HCPCS: Performed by: INTERNAL MEDICINE

## 2025-02-03 PROCEDURE — 63600175 PHARM REV CODE 636 W HCPCS: Performed by: INTERNAL MEDICINE

## 2025-02-03 PROCEDURE — 12000002 HC ACUTE/MED SURGE SEMI-PRIVATE ROOM

## 2025-02-03 PROCEDURE — 87070 CULTURE OTHR SPECIMN AEROBIC: CPT

## 2025-02-03 PROCEDURE — 25500020 PHARM REV CODE 255

## 2025-02-03 PROCEDURE — 80053 COMPREHEN METABOLIC PANEL: CPT | Performed by: INTERNAL MEDICINE

## 2025-02-03 RX ORDER — BUPRENORPHINE AND NALOXONE 8; 2 MG/1; MG/1
1 FILM, SOLUBLE BUCCAL; SUBLINGUAL DAILY
Status: DISCONTINUED | OUTPATIENT
Start: 2025-02-03 | End: 2025-02-03

## 2025-02-03 RX ORDER — GLUCAGON 1 MG
1 KIT INJECTION
Status: DISCONTINUED | OUTPATIENT
Start: 2025-02-03 | End: 2025-02-06 | Stop reason: HOSPADM

## 2025-02-03 RX ORDER — IBUPROFEN 200 MG
16 TABLET ORAL
Status: DISCONTINUED | OUTPATIENT
Start: 2025-02-03 | End: 2025-02-06 | Stop reason: HOSPADM

## 2025-02-03 RX ORDER — CARVEDILOL 3.12 MG/1
3.12 TABLET ORAL 2 TIMES DAILY
Status: DISCONTINUED | OUTPATIENT
Start: 2025-02-03 | End: 2025-02-03

## 2025-02-03 RX ORDER — INSULIN GLARGINE 100 [IU]/ML
18 INJECTION, SOLUTION SUBCUTANEOUS NIGHTLY
Status: DISCONTINUED | OUTPATIENT
Start: 2025-02-03 | End: 2025-02-04

## 2025-02-03 RX ORDER — IBUPROFEN 200 MG
24 TABLET ORAL
Status: DISCONTINUED | OUTPATIENT
Start: 2025-02-03 | End: 2025-02-06 | Stop reason: HOSPADM

## 2025-02-03 RX ORDER — INSULIN ASPART 100 [IU]/ML
6 INJECTION, SOLUTION INTRAVENOUS; SUBCUTANEOUS ONCE
Status: COMPLETED | OUTPATIENT
Start: 2025-02-03 | End: 2025-02-03

## 2025-02-03 RX ORDER — LABETALOL HYDROCHLORIDE 5 MG/ML
10 INJECTION, SOLUTION INTRAVENOUS EVERY 6 HOURS PRN
Status: DISCONTINUED | OUTPATIENT
Start: 2025-02-03 | End: 2025-02-06 | Stop reason: HOSPADM

## 2025-02-03 RX ORDER — BUPRENORPHINE AND NALOXONE 8; 2 MG/1; MG/1
1 FILM, SOLUBLE BUCCAL; SUBLINGUAL 3 TIMES DAILY
Status: DISCONTINUED | OUTPATIENT
Start: 2025-02-03 | End: 2025-02-03

## 2025-02-03 RX ORDER — INSULIN ASPART 100 [IU]/ML
0-15 INJECTION, SOLUTION INTRAVENOUS; SUBCUTANEOUS
Status: DISCONTINUED | OUTPATIENT
Start: 2025-02-03 | End: 2025-02-06 | Stop reason: HOSPADM

## 2025-02-03 RX ORDER — BUPRENORPHINE AND NALOXONE 8; 2 MG/1; MG/1
1 FILM, SOLUBLE BUCCAL; SUBLINGUAL 2 TIMES DAILY
Status: DISCONTINUED | OUTPATIENT
Start: 2025-02-03 | End: 2025-02-06 | Stop reason: HOSPADM

## 2025-02-03 RX ORDER — LABETALOL HYDROCHLORIDE 5 MG/ML
10 INJECTION, SOLUTION INTRAVENOUS ONCE
Status: COMPLETED | OUTPATIENT
Start: 2025-02-03 | End: 2025-02-03

## 2025-02-03 RX ADMIN — IPRATROPIUM BROMIDE 0.5 MG: 0.5 SOLUTION RESPIRATORY (INHALATION) at 07:02

## 2025-02-03 RX ADMIN — INSULIN ASPART 6 UNITS: 100 INJECTION, SOLUTION INTRAVENOUS; SUBCUTANEOUS at 02:02

## 2025-02-03 RX ADMIN — BUDESONIDE INHALATION 0.5 MG: 0.5 SUSPENSION RESPIRATORY (INHALATION) at 07:02

## 2025-02-03 RX ADMIN — IPRATROPIUM BROMIDE 0.5 MG: 0.5 SOLUTION RESPIRATORY (INHALATION) at 03:02

## 2025-02-03 RX ADMIN — INSULIN ASPART 9 UNITS: 100 INJECTION, SOLUTION INTRAVENOUS; SUBCUTANEOUS at 12:02

## 2025-02-03 RX ADMIN — FAMOTIDINE 20 MG: 20 TABLET ORAL at 08:02

## 2025-02-03 RX ADMIN — LABETALOL HYDROCHLORIDE 10 MG: 5 INJECTION, SOLUTION INTRAVENOUS at 11:02

## 2025-02-03 RX ADMIN — INSULIN GLARGINE 18 UNITS: 100 INJECTION, SOLUTION SUBCUTANEOUS at 09:02

## 2025-02-03 RX ADMIN — LABETALOL HYDROCHLORIDE 10 MG: 5 INJECTION, SOLUTION INTRAVENOUS at 05:02

## 2025-02-03 RX ADMIN — INSULIN ASPART 12 UNITS: 100 INJECTION, SOLUTION INTRAVENOUS; SUBCUTANEOUS at 05:02

## 2025-02-03 RX ADMIN — ATORVASTATIN CALCIUM 10 MG: 10 TABLET, FILM COATED ORAL at 09:02

## 2025-02-03 RX ADMIN — LEVALBUTEROL HYDROCHLORIDE 1.25 MG: 1.25 SOLUTION RESPIRATORY (INHALATION) at 03:02

## 2025-02-03 RX ADMIN — CEFEPIME 2 G: 2 INJECTION, POWDER, FOR SOLUTION INTRAVENOUS at 04:02

## 2025-02-03 RX ADMIN — IOHEXOL 100 ML: 350 INJECTION, SOLUTION INTRAVENOUS at 10:02

## 2025-02-03 RX ADMIN — BUPRENORPHINE AND NALOXONE 1 FILM: 8; 2 FILM BUCCAL; SUBLINGUAL at 09:02

## 2025-02-03 RX ADMIN — SODIUM CHLORIDE: 9 INJECTION, SOLUTION INTRAVENOUS at 11:02

## 2025-02-03 RX ADMIN — CLOPIDOGREL BISULFATE 75 MG: 75 TABLET, FILM COATED ORAL at 08:02

## 2025-02-03 RX ADMIN — IPRATROPIUM BROMIDE 0.5 MG: 0.5 SOLUTION RESPIRATORY (INHALATION) at 11:02

## 2025-02-03 RX ADMIN — FAMOTIDINE 20 MG: 20 TABLET ORAL at 09:02

## 2025-02-03 RX ADMIN — CEFEPIME 2 G: 2 INJECTION, POWDER, FOR SOLUTION INTRAVENOUS at 11:02

## 2025-02-03 RX ADMIN — CEFEPIME 2 G: 2 INJECTION, POWDER, FOR SOLUTION INTRAVENOUS at 09:02

## 2025-02-03 RX ADMIN — LEVALBUTEROL HYDROCHLORIDE 1.25 MG: 1.25 SOLUTION RESPIRATORY (INHALATION) at 07:02

## 2025-02-03 RX ADMIN — LEVALBUTEROL HYDROCHLORIDE 1.25 MG: 1.25 SOLUTION RESPIRATORY (INHALATION) at 11:02

## 2025-02-03 RX ADMIN — BUDESONIDE INHALATION 0.5 MG: 0.5 SUSPENSION RESPIRATORY (INHALATION) at 08:02

## 2025-02-03 RX ADMIN — ENOXAPARIN SODIUM 40 MG: 40 INJECTION SUBCUTANEOUS at 05:02

## 2025-02-03 RX ADMIN — INSULIN ASPART 10 UNITS: 100 INJECTION, SOLUTION INTRAVENOUS; SUBCUTANEOUS at 09:02

## 2025-02-03 NOTE — ASSESSMENT & PLAN NOTE
- add on TSH  - likely from hypoxia and withdrawal from Suboxone  - CTA chest protocol negative for PE

## 2025-02-03 NOTE — ASSESSMENT & PLAN NOTE
Patient with Hypercapnic Respiratory failure which is Acute.  he is not on home oxygen. Supplemental oxygen was provided and noted-      .   Signs/symptoms of respiratory failure include- tachypnea, increased work of breathing, respiratory distress, use of accessory muscles, and lethargy. Contributing diagnoses includes - COPD and Pneumonia Labs and images were reviewed. Patient Has recent ABG, which has been reviewed. Will treat underlying causes and adjust management of respiratory failure as follows-     IV antibiotics  DuoNebs  CTA chest PE protocol ordered with his tachycardia/hypoxia, negative for PE

## 2025-02-03 NOTE — PHARMACY MED REC
"              .        Admission Medication History     The home medication history was taken by Bteh Garcia.    You may go to "Admission" then "Reconcile Home Medications" tabs to review and/or act upon these items.     The home medication list has been updated by the Pharmacy department.   Please read ALL comments highlighted in yellow.   Please address this information as you see fit.    Feel free to contact us if you have any questions or require assistance.      Medications listed below were obtained from: Patient/family and Analytic software- [employer]  No current facility-administered medications on file prior to encounter.     Current Outpatient Medications on File Prior to Encounter   Medication Sig Dispense Refill    buprenorphine-naloxone 8-2 mg (SUBOXONE) 8-2 mg Place 0.5-1 Film under the tongue 3 (three) times daily.      insulin glargine U-100, Lantus, (LANTUS SOLOSTAR U-100 INSULIN) 100 unit/mL (3 mL) InPn pen Inject 15 Units into the skin every evening. 4.5 mL 11    sildenafiL (VIAGRA) 25 MG tablet Take 1 tablet (25 mg total) by mouth daily as needed for Erectile Dysfunction. 30 tablet 0    atorvastatin (LIPITOR) 10 MG tablet Take 1 tablet (10 mg total) by mouth every evening. (Patient not taking: Reported on 2/2/2025) 30 tablet 3    blood sugar diagnostic Strp To check BG 3 times daily, to use with insurance preferred meter  Dx E 11.65 100 each 1    blood-glucose meter kit To check BG 3 times daily, to use with insurance preferred meter DX E 11.65 1 each 1    clopidogreL (PLAVIX) 75 mg tablet Take 1 tablet (75 mg total) by mouth once daily. 30 tablet 5    DEXCOM G7 SENSOR May 1 Device by Misc.(Non-Drug; Combo Route) route every 10 days. 9 each 3    gabapentin (NEURONTIN) 300 MG capsule Take 1 capsule (300 mg total) by mouth 2 (two) times daily. (Patient not taking: Reported on 2/2/2025) 60 capsule 11    lancets Misc To check BG 3 times daily, to use with insurance preferred meter Dx E " "11.65 100 each 1    metFORMIN (GLUCOPHAGE) 500 MG tablet Take 2 tablets (1,000 mg total) by mouth 2 (two) times daily with meals. (Patient not taking: Reported on 2/2/2025) 120 tablet 3    nicotine (NICODERM CQ) 21 mg/24 hr Place 1 patch onto the skin once daily. (Patient not taking: Reported on 2/2/2025) 28 patch 0    nicotine polacrilex 2 MG Lozg Take 1 lozenge (2 mg total) by mouth as needed (Use in place of cigarettes/Do not exceed 5 pieces per day). (Patient not taking: Reported on 2/2/2025) 189 lozenge 0    pen needle, diabetic 32 gauge x 5/32" Ndle 1 Needle by Misc.(Non-Drug; Combo Route) route 4 (four) times daily. 400 each 3    SITagliptin phosphate (JANUVIA) 100 MG Tab Take 1 tablet (100 mg total) by mouth once daily. (Patient not taking: Reported on 2/2/2025) 90 tablet 3    [DISCONTINUED] mirtazapine (REMERON) 15 MG tablet Take 15 mg by mouth every evening.         Potential issues to be addressed PRIOR TO DISCHARGE  Patient reported not taking the following medications: (Lipitor, Gabapentin, Metformin, Nicotine Patch, Nicotine Lozenge & Januvia). These medications remain on the home medication list. Please address accordingly.     Beth Garcia  EXT 1921      "

## 2025-02-03 NOTE — ASSESSMENT & PLAN NOTE
Patient with Hypercapnic Respiratory failure which is Acute.  he is not on home oxygen. Supplemental oxygen was provided and noted-      .   Signs/symptoms of respiratory failure include- tachypnea, increased work of breathing, respiratory distress, use of accessory muscles, and lethargy. Contributing diagnoses includes - COPD and Pneumonia Labs and images were reviewed. Patient Has recent ABG, which has been reviewed. Will treat underlying causes and adjust management of respiratory failure as follows-

## 2025-02-03 NOTE — PLAN OF CARE
CM called and spoke with patient's spouse, Bobbi, for initial assessment.  Confirmed information on face sheet correct.  Patient lives at home with spouse.  Patient is normally independent with adls, uses no dme (has dexcom) and does drive.  PCP is Dr Aletha Henson.  Pharmacy is [facility] on [address].  Patient has no current home services and does not request any at this time.  Wife or daughter to transport at discharge.        02/03/25 8403   Discharge Assessment   Assessment Type Discharge Planning Assessment   Confirmed/corrected address, phone number and insurance Yes   Confirmed Demographics Correct on Facesheet   Source of Information family   When was your last doctors appointment?   (January 2025)   Communicated MADDIE with patient/caregiver Date not available/Unable to determine   Reason For Admission Shortness of breath   People in Home spouse   Do you expect to return to your current living situation? Yes   Do you have help at home or someone to help you manage your care at home?   (Wife)   Prior to hospitilization cognitive status: Unable to Assess   Current cognitive status: Unable to Assess   Walking or Climbing Stairs Difficulty no   Dressing/Bathing Difficulty no   Equipment Currently Used at Home   (dexcom)   Readmission within 30 days? No   Patient currently being followed by outpatient case management? No   Do you currently have service(s) that help you manage your care at home? No   Do you take prescription medications? Yes   Do you have prescription coverage? Yes   Coverage Medicaid   Do you have any problems affording any of your prescribed medications? No   Is the patient taking medications as prescribed? yes   Who is going to help you get home at discharge? wife or daughter   How do you get to doctors appointments? car, drives self   Are you on dialysis? No   Do you take coumadin? No  (plavix)   Discharge Plan A Home with family   Discharge Plan B Home with family   DME Needed Upon  Discharge  none   Discharge Plan discussed with: Spouse/sig other   Name(s) and Number(s) Bobbi Ingram, wife [phone]   Transition of Care Barriers None

## 2025-02-03 NOTE — PROGRESS NOTES
[facility]  Progress Note    Patient Name: Sergio Porter  MRN: [id number]  Patient Class: IP- Inpatient   Admission Date: 2/2/2025  Length of Stay: 1 days  Attending Physician: Zakiya Arredondo, *  Primary Care Provider: No, Primary Doctor        Subjective     Principal Problem:Acute hypercapnic respiratory failure        HPI:  49 year old pt getting admitted with acute resp failure/acute pneumonia  Pt started having URTI Like symptoms few days ago  He continued to smoke and stopped smoking 2 days ago  Last few days pt has been having cough and SOB  He cannot even walk towards mailbox and felt very SOB  He came to hospital today and got admitted     Overview/Hospital Course:  Mr. Porter is a 49-year-old male admitted for respiratory distress likely secondary to pneumonia.  He was started on IV vancomycin and cefepime.  Respiratory viral panel negative.  Blood cultures and sputum cultures were closely followed. He also had episodes of hypertensive urgency that required IV labetalol.  Patient continued to have tachycardia, tachypnea and hypoxia with respiratory distress and CTA PE protocol was ordered that was negative for PE.  Also had episodes of hyperglycemia requiring adjustment in insulin regimen.      Interval History:  Patient is seen and examined this morning.  He was found taking Suboxone from his while at, educated him about the risks of taking any medication without Staff knowledge.  I handed them to his nurse to log them safely.  We explained that we shall order it here and he verbalized understanding.  CTA PE protocol ordered for continued hypoxia/tachypnea sinus tachycardia. Hyperglycemia to 391, increased to high-dose sliding scale.  White count today normal.    Hypertensive urgency, no significant signs or symptoms, labetalol administered. Regarding his hypertension overnight- As per ED nurse, patient likes to walk around and always bends his arm while checking  the blood pressure and hence those readings.      Review of Systems negative  Objective:     Vital Signs (Most Recent):  Temp: 99.7 °F (37.6 °C) (02/02/25 1516)  Pulse: 110 (02/03/25 0900)  Resp: (!) 22 (02/03/25 0900)  BP: (!) 202/98 (02/03/25 0900)  SpO2: (!) 91 % (02/03/25 0900) Vital Signs (24h Range):  Temp:  [99.7 °F (37.6 °C)] 99.7 °F (37.6 °C)  Pulse:  [] 110  Resp:  [17-34] 22  SpO2:  [70 %-100 %] 91 %  BP: (130-233)/() 202/98     Weight: 52.2 kg (115 lb)  Body mass index is 18.01 kg/m².    Intake/Output Summary (Last 24 hours) at 2/3/2025 0932  Last data filed at 2/3/2025 0848  Gross per 24 hour   Intake 2300.06 ml   Output --   Net 2300.06 ml         Physical Exam  Vitals and nursing note reviewed.   Constitutional:       Appearance: He is well-developed.   HENT:      Head: Atraumatic.      Right Ear: External ear normal.      Left Ear: External ear normal.      Nose: Nose normal.      Mouth/Throat:      Mouth: Mucous membranes are moist.   Eyes:      Extraocular Movements: Extraocular movements intact.   Cardiovascular:      Rate and Rhythm: Regular rhythm. Tachycardia present.   Pulmonary:      Effort: Pulmonary effort is normal.   Abdominal:      Palpations: Abdomen is soft.   Musculoskeletal:         General: Normal range of motion.      Cervical back: Full passive range of motion without pain and normal range of motion.   Skin:     General: Skin is warm.   Neurological:      Mental Status: He is alert and oriented to person, place, and time.   Psychiatric:         Behavior: Behavior normal.             Significant Labs: All pertinent labs within the past 24 hours have been reviewed.    Significant Imaging: I have reviewed all pertinent imaging results/findings within the past 24 hours.    Assessment and Plan     * Acute hypercapnic respiratory failure  Patient with Hypercapnic Respiratory failure which is Acute.  he is not on home oxygen. Supplemental oxygen was provided and noted-      .    Signs/symptoms of respiratory failure include- tachypnea, increased work of breathing, respiratory distress, use of accessory muscles, and lethargy. Contributing diagnoses includes - COPD and Pneumonia Labs and images were reviewed. Patient Has recent ABG, which has been reviewed. Will treat underlying causes and adjust management of respiratory failure as follows-     IV antibiotics  DuoNebs  CTA chest PE protocol ordered with his tachycardia/hypoxia, negative for PE    Opiate use  - on Suboxone at home  - continue while here  - patient mentions he takes 0.5-1 sublingual films 3 times a day, we double-checked the dose with his pharmacy, ordered as 1 film BID ( cannot split the film as per pharmacy)    Hypertensive urgency  Patient has a current diagnosis of hypertensive urgency (without evidence of end organ damage) which is uncontrolled.  Latest blood pressure and vitals reviewed-   Temp:  [99.7 °F (37.6 °C)]   Pulse:  []   Resp:  [17-34]   BP: (130-233)/()   SpO2:  [70 %-100 %] .   Patient currently off IV antihypertensives.   Home meds for hypertension were reviewed and noted below.   Not on any antihypertensives at home    Medication adjustment for hospital antihypertensives is as follows-likely also from his withdrawal, restart Suboxone, IV labetalol p.r.n., if does not improve can schedule Coreg      Will aim for controlled BP reduction by medications noted above. Monitor and mitigate end organ damage as indicated.    Sinus tachycardia  - add on TSH  - likely from hypoxia and withdrawal from Suboxone  - CTA chest protocol negative for PE      PVD (peripheral vascular disease)  Maintain statins and antiplatelets      Acute pneumonia  One dose of iv vancomycin and primary team has to decide upon further doses of iv vancomycin  Maintain iv cefepime   Follow sputum culture and deescalate as needed    Antibiotics (From admission, onward)      Start     Stop Route Frequency Ordered    02/02/25 2000   ceFEPIme injection 2 g         -- IV Every 8 hours (non-standard times) 02/02/25 1845            Microbiology Results (last 7 days)       Procedure Component Value Units Date/Time    Culture, Respiratory with Gram Stain [[id number]] Collected: 02/03/25 0751    Order Status: Sent Specimen: Respiratory from Sputum, Expectorated Updated: 02/03/25 0818    Blood culture x two cultures. Draw prior to antibiotics [[id number]] Collected: 02/02/25 1546    Order Status: Completed Specimen: Blood from Peripheral, Hand, Left Updated: 02/02/25 2317     Blood Culture, Routine No Growth to date    Narrative:      Aerobic and anaerobic    Blood culture x two cultures. Draw prior to antibiotics [[id number]] Collected: 02/02/25 1538    Order Status: Completed Specimen: Blood from Peripheral, Forearm, Right Updated: 02/02/25 2317     Blood Culture, Routine No Growth to date    Narrative:      Aerobic and anaerobic    Respiratory Infection Panel (PCR), Nasopharyngeal [[id number]] Collected: 02/02/25 1722    Order Status: Completed Specimen: Nasopharyngeal Swab Updated: 02/02/25 1938     Respiratory Infection Panel Source NP swab     Adenovirus Not Detected     Coronavirus 229E, Common Cold Virus Not Detected     Coronavirus HKU1, Common Cold Virus Not Detected     Coronavirus NL63, Common Cold Virus Not Detected     Coronavirus OC43, Common Cold Virus Not Detected     Comment: Coronavirus strains 229E, HKU1, NL63, and OC43 can cause the common   cold   and are not associated with the respiratory disease outbreak caused   by  the COVID-19 (SARS-CoV-2 novel Coronavirus) strain.           SARS-CoV2 (COVID-19) Qualitative PCR Not Detected     Human Metapneumovirus Not Detected     Human Rhinovirus/Enterovirus Not Detected     Influenza A Not Detected     Influenza B Not Detected     Parainfluenza Virus 1 Not Detected     Parainfluenza Virus 2 Not Detected     Parainfluenza Virus 3 Not Detected     Parainfluenza Virus 4 Not Detected      Respiratory Syncytial Virus Not Detected     Bordetella Parapertussis (WA1442) Not Detected     Bordetella pertussis (ptxP) Not Detected     Chlamydia pneumoniae Not Detected     Mycoplasma pneumoniae Not Detected     Comment: Respiratory Infection Panel testing performed by Multiplex PCR.       Narrative:      Respiratory Infection Panel source->NP Swab            Medical non-compliance  Per chart review       Tobacco dependence  Aware     Type 2 diabetes mellitus without complication  Patient's FSGs are uncontrolled due to hyperglycemia on current medication regimen.  Last A1c reviewed-   Lab Results   Component Value Date    HGBA1C 11.4 (H) 08/09/2024    HGBA1C 11.4 (H) 08/09/2024     Most recent fingerstick glucose reviewed-   Recent Labs   Lab 02/03/25  0212 02/03/25  0420 02/03/25  0823 02/03/25  1112   POCTGLUCOSE 457* 399* 365* 273*       Current correctional scale  increased to high dose sliding scale based on the blood glucose this morning  Maintain anti-hyperglycemic dose as follows-   Antihyperglycemics (From admission, onward)      Start     Stop Route Frequency Ordered    02/03/25 1032  insulin aspart U-100 pen 0-15 Units         -- SubQ Before meals & nightly PRN 02/03/25 0932    02/02/25 2100  insulin glargine U-100 (Lantus) pen 15 Units         -- SubQ Nightly 02/02/25 1839          Hold Oral hypoglycemics while patient is in the hospital.        VTE Risk Mitigation (From admission, onward)           Ordered     enoxaparin injection 40 mg  Daily         02/02/25 1839     IP VTE HIGH RISK PATIENT  Once         02/02/25 1839     Place sequential compression device  Until discontinued         02/02/25 1839                    Discharge Planning   MADDIE: 2/6/2025     Code Status: Full Code   Medical Readiness for Discharge Date:   Discharge Plan A: Home with family                        Zakiya Arredondo MD  Department of Hospital Medicine   [facility]

## 2025-02-03 NOTE — SUBJECTIVE & OBJECTIVE
Interval History:  Patient is seen and examined this morning.  He was found taking Suboxone from his while at, educated him about the risks of taking any medication without Staff knowledge.  I handed them to his nurse to log them safely.  We explained that we shall order it here and he verbalized understanding.  CTA PE protocol ordered for continued hypoxia/tachypnea sinus tachycardia. Hyperglycemia to 391, increased to high-dose sliding scale.  White count today normal.    Hypertensive urgency, no significant signs or symptoms, labetalol administered. Regarding his hypertension overnight- As per ED nurse, patient likes to walk around and always bends his arm while checking the blood pressure and hence those readings.      Review of Systems negative  Objective:     Vital Signs (Most Recent):  Temp: 99.7 °F (37.6 °C) (02/02/25 1516)  Pulse: 110 (02/03/25 0900)  Resp: (!) 22 (02/03/25 0900)  BP: (!) 202/98 (02/03/25 0900)  SpO2: (!) 91 % (02/03/25 0900) Vital Signs (24h Range):  Temp:  [99.7 °F (37.6 °C)] 99.7 °F (37.6 °C)  Pulse:  [] 110  Resp:  [17-34] 22  SpO2:  [70 %-100 %] 91 %  BP: (130-233)/() 202/98     Weight: 52.2 kg (115 lb)  Body mass index is 18.01 kg/m².    Intake/Output Summary (Last 24 hours) at 2/3/2025 0932  Last data filed at 2/3/2025 0848  Gross per 24 hour   Intake 2300.06 ml   Output --   Net 2300.06 ml         Physical Exam  Vitals and nursing note reviewed.   Constitutional:       Appearance: He is well-developed.   HENT:      Head: Atraumatic.      Right Ear: External ear normal.      Left Ear: External ear normal.      Nose: Nose normal.      Mouth/Throat:      Mouth: Mucous membranes are moist.   Eyes:      Extraocular Movements: Extraocular movements intact.   Cardiovascular:      Rate and Rhythm: Regular rhythm. Tachycardia present.   Pulmonary:      Effort: Pulmonary effort is normal.   Abdominal:      Palpations: Abdomen is soft.   Musculoskeletal:         General: Normal  range of motion.      Cervical back: Full passive range of motion without pain and normal range of motion.   Skin:     General: Skin is warm.   Neurological:      Mental Status: He is alert and oriented to person, place, and time.   Psychiatric:         Behavior: Behavior normal.             Significant Labs: All pertinent labs within the past 24 hours have been reviewed.    Significant Imaging: I have reviewed all pertinent imaging results/findings within the past 24 hours.

## 2025-02-03 NOTE — NURSING
"Upon entering the patient's room for rounds he was seen taking Suboxone medication. Patient was educated on the dangerous of that medication without staff's knowledge. Patient did hand over the medication to the nurse and was in agreeable with treatment plan. Medication was put into "Patient Own Medication Bin" in the [facility]. Will continue to monitor.  "

## 2025-02-03 NOTE — H&P
Critical access hospital - [facility]  [facility] Medicine  History & Physical    Patient Name: Sergio Porter  MRN: [id number]  Patient Class: IP- Inpatient  Admission Date: 2/2/2025  Attending Physician: Sharad Azevedo MD   Primary Care Provider: Corie Primary Doctor         Patient information was obtained from patient, past medical records, ER records, and ER MD .     Subjective:     Principal Problem:Acute hypercapnic respiratory failure    Chief Complaint:   Chief Complaint   Patient presents with    Cough    Shortness of Breath        HPI: 49 year old pt getting admitted with acute resp failure/acute pneumonia  Pt started having URTI Like symptoms few days ago  He continued to smoke and stopped smoking 2 days ago  Last few days pt has been having cough and SOB  He cannot even walk towards mailbox and felt very SOB  He came to hospital today and got admitted     Past Medical History:   Diagnosis Date    COVID-19     around june 2022, per patient/wife    Diabetes mellitus, type 2     Dyslipidemia     Opioid abuse        Past Surgical History:   Procedure Laterality Date    ANGIOGRAM, ABDOMINAL AORTA N/A 1/27/2024    Procedure: Angiogram, Abdominal Aorta;  Surgeon: Bhavesh Pelletier MD;  Location: [facility] CATH/EP LAB;  Service: General;  Laterality: N/A;    ANGIOGRAM, EXTREMITY, BILATERAL Right 1/27/2024    Procedure: ANGIOGRAM, EXTREMITY, BILATERAL;  Surgeon: Bhavesh Pelletier MD;  Location: [facility] CATH/EP LAB;  Service: General;  Laterality: Right;    INCISION AND DRAINAGE OF ABSCESS N/A 9/16/2022    Procedure: INCISION AND DRAINAGE, ABSCESS- BACK;  Surgeon: Glenn Fraser MD;  Location: [facility] OR;  Service: General;  Laterality: N/A;    INTRAVASCULAR ULTRASOUND, NON-CORONARY N/A 1/27/2024    Procedure: Intravascular Ultrasound, Non-Coronary;  Surgeon: Bhavesh Pelletier MD;  Location: [facility] CATH/EP LAB;  Service: General;  Laterality: N/A;    STENTS, ILIAC, BILATERAL Bilateral 1/27/2024    Procedure: Stents, Iliac,  "Bilateral;  Surgeon: Bhavesh Pelletier MD;  Location: [facility] CATH/EP LAB;  Service: General;  Laterality: Bilateral;       Review of patient's allergies indicates:  No Known Allergies    No current facility-administered medications on file prior to encounter.     Current Outpatient Medications on File Prior to Encounter   Medication Sig    atorvastatin (LIPITOR) 10 MG tablet Take 1 tablet (10 mg total) by mouth every evening.    blood sugar diagnostic Strp To check BG 3 times daily, to use with insurance preferred meter  Dx E 11.65    blood-glucose meter kit To check BG 3 times daily, to use with insurance preferred meter DX E 11.65    buprenorphine-naloxone 8-2 mg (SUBOXONE) 8-2 mg Place 1 Film under the tongue once daily. Per patient    clopidogreL (PLAVIX) 75 mg tablet Take 1 tablet (75 mg total) by mouth once daily.    DEXCOM G7 SENSOR May 1 Device by Misc.(Non-Drug; Combo Route) route every 10 days.    gabapentin (NEURONTIN) 300 MG capsule Take 1 capsule (300 mg total) by mouth 2 (two) times daily.    insulin glargine U-100, Lantus, (LANTUS SOLOSTAR U-100 INSULIN) 100 unit/mL (3 mL) InPn pen Inject 15 Units into the skin every evening.    lancets Misc To check BG 3 times daily, to use with insurance preferred meter Dx E 11.65    metFORMIN (GLUCOPHAGE) 500 MG tablet Take 2 tablets (1,000 mg total) by mouth 2 (two) times daily with meals.    mirtazapine (REMERON) 15 MG tablet Take 15 mg by mouth every evening.    nicotine (NICODERM CQ) 21 mg/24 hr Place 1 patch onto the skin once daily.    nicotine polacrilex 2 MG Lozg Take 1 lozenge (2 mg total) by mouth as needed (Use in place of cigarettes/Do not exceed 5 pieces per day).    pen needle, diabetic 32 gauge x 5/32" Ndle 1 Needle by Misc.(Non-Drug; Combo Route) route 4 (four) times daily.    sildenafiL (VIAGRA) 25 MG tablet Take 1 tablet (25 mg total) by mouth daily as needed for Erectile Dysfunction.    SITagliptin phosphate (JANUVIA) 100 MG Tab Take 1 tablet (100 " mg total) by mouth once daily.     Family History       Problem Relation (Age of Onset)    Diabetes Mother, Father, Maternal Grandmother, Paternal Grandmother    Heart disease Father    Hypertension Father          Tobacco Use    Smoking status: Every Day     Current packs/day: 1.00     Average packs/day: 1 pack/day for 37.1 years (36.6 ttl pk-yrs)     Types: Cigarettes     Start date: 1988     Passive exposure: Current    Smokeless tobacco: Never   Substance and Sexual Activity    Alcohol use: Not Currently    Drug use: Not Currently     Types: Marijuana, Other-see comments     Comment: pain medications/opioids    Sexual activity: Not Currently     Review of Systems   Constitutional:  Negative for activity change and appetite change.   HENT:  Negative for congestion and dental problem.    Eyes:  Negative for discharge and itching.   Respiratory:  Positive for cough and shortness of breath.    Cardiovascular:  Positive for palpitations. Negative for chest pain.   Gastrointestinal:  Negative for abdominal distention and abdominal pain.   Endocrine: Negative for cold intolerance.   Genitourinary:  Negative for difficulty urinating and dysuria.   Musculoskeletal:  Negative for arthralgias and back pain.   Skin:  Negative for color change.   Neurological:  Negative for dizziness and facial asymmetry.   Hematological:  Negative for adenopathy.   Psychiatric/Behavioral:  Negative for agitation and behavioral problems.      Objective:     Vital Signs (Most Recent):  Temp: 99.7 °F (37.6 °C) (02/02/25 1516)  Pulse: (!) 114 (02/02/25 1748)  Resp: 20 (02/02/25 1748)  BP: (!) 174/82 (02/02/25 1656)  SpO2: (!) 93 % (02/02/25 1748) Vital Signs (24h Range):  Temp:  [99.7 °F (37.6 °C)] 99.7 °F (37.6 °C)  Pulse:  [114-124] 114  Resp:  [20-34] 20  SpO2:  [79 %-99 %] 93 %  BP: (130-182)/(71-89) 174/82     Weight: 52.2 kg (115 lb)  Body mass index is 18.01 kg/m².     Physical Exam  Vitals and nursing note reviewed.   Constitutional:        Appearance: He is well-developed.   HENT:      Head: Atraumatic.      Right Ear: External ear normal.      Left Ear: External ear normal.      Nose: Nose normal.      Mouth/Throat:      Mouth: Mucous membranes are moist.   Eyes:      Extraocular Movements: Extraocular movements intact.   Cardiovascular:      Rate and Rhythm: Regular rhythm. Tachycardia present.   Pulmonary:      Effort: Pulmonary effort is normal.   Abdominal:      Palpations: Abdomen is soft.   Musculoskeletal:         General: Normal range of motion.      Cervical back: Full passive range of motion without pain and normal range of motion.   Skin:     General: Skin is warm.   Neurological:      Mental Status: He is alert and oriented to person, place, and time.   Psychiatric:         Behavior: Behavior normal.                Significant Labs: All pertinent labs within the past 24 hours have been reviewed.  CBC:   Recent Labs   Lab 02/02/25  1541   WBC 13.02*   HGB 12.1*   HCT 38.6*        CMP:   Recent Labs   Lab 02/02/25  1541      K 3.8   CL 94*   CO2 30*   *   BUN 21*   CREATININE 0.8   CALCIUM 9.5   PROT 7.5   ALBUMIN 3.6   BILITOT 0.6   ALKPHOS 135   AST 21   ALT 15   ANIONGAP 12       Significant Imaging: I have reviewed all pertinent imaging results/findings within the past 24 hours.    Assessment/Plan:     * Acute hypercapnic respiratory failure  Patient with Hypercapnic Respiratory failure which is Acute.  he is not on home oxygen. Supplemental oxygen was provided and noted-      .   Signs/symptoms of respiratory failure include- tachypnea, increased work of breathing, respiratory distress, use of accessory muscles, and lethargy. Contributing diagnoses includes - COPD and Pneumonia Labs and images were reviewed. Patient Has recent ABG, which has been reviewed. Will treat underlying causes and adjust management of respiratory failure as follows-     Acute pneumonia  One dose of iv vancomycin and primary team has to  decide upon further doses of iv vancomycin  Maintain iv cefepime     Antibiotics (From admission, onward)      Start     Stop Route Frequency Ordered    02/02/25 2000  ceFEPIme injection 2 g         -- IV Every 8 hours (non-standard times) 02/02/25 1845    02/02/25 1745  vancomycin (VANCOCIN) 1,000 mg in 0.9% NaCl 250 mL IVPB (admixture device)  (ED Adult Sepsis Treatment)         02/03/25 0544 IV ED 1 Time 02/02/25 1732            Microbiology Results (last 7 days)       Procedure Component Value Units Date/Time    Respiratory Infection Panel (PCR), Nasopharyngeal [[id number]] Collected: 02/02/25 1722    Order Status: Completed Specimen: Nasopharyngeal Swab Updated: 02/02/25 1726     Respiratory Infection Panel Source NP swab    Narrative:      Respiratory Infection Panel source->NP Swab    Blood culture x two cultures. Draw prior to antibiotics [[id number]] Collected: 02/02/25 1546    Order Status: Sent Specimen: Blood from Peripheral, Hand, Left Updated: 02/02/25 1558    Blood culture x two cultures. Draw prior to antibiotics [[id number]] Collected: 02/02/25 1538    Order Status: Sent Specimen: Blood from Peripheral, Forearm, Right Updated: 02/02/25 1558            PVD (peripheral vascular disease)  Maintain statins and antiplatelets      Medical non-compliance  Per chart review       Tobacco dependence  Aware     Type 2 diabetes mellitus without complication  Patient's FSGs are uncontrolled due to hyperglycemia on current medication regimen.  Last A1c reviewed-   Lab Results   Component Value Date    HGBA1C 11.4 (H) 08/09/2024    HGBA1C 11.4 (H) 08/09/2024     Most recent fingerstick glucose reviewed-   Recent Labs   Lab 02/02/25  1535   POCTGLUCOSE 264*     Current correctional scale  Medium  Maintain anti-hyperglycemic dose as follows-   Antihyperglycemics (From admission, onward)      Start     Stop Route Frequency Ordered    02/02/25 2100  insulin glargine U-100 (Lantus) pen 15 Units         -- SubQ Nightly  02/02/25 1839 02/02/25 1938  insulin aspart U-100 pen 0-10 Units         -- SubQ Before meals & nightly PRN 02/02/25 1839          Hold Oral hypoglycemics while patient is in the hospital.      VTE Risk Mitigation (From admission, onward)           Ordered     enoxaparin injection 40 mg  Daily         02/02/25 1839     IP VTE HIGH RISK PATIENT  Once         02/02/25 1839     Place sequential compression device  Until discontinued         02/02/25 1839                               Pharmacist Renal Dose Adjustment Note    Sergio Porter is a 49 y.o. male being treated with the medication zosyn.    Patient Data:    Vital Signs (Most Recent):  Temp: 99.7 °F (37.6 °C) (02/02/25 1516)  Pulse: (!) 114 (02/02/25 1748)  Resp: 20 (02/02/25 1748)  BP: (!) 174/82 (02/02/25 1656)  SpO2: (!) 93 % (02/02/25 1748) Vital Signs (72h Range):  Temp:  [99.7 °F (37.6 °C)]   Pulse:  [114-124]   Resp:  [20-34]   BP: (130-182)/(71-89)   SpO2:  [79 %-99 %]      Recent Labs   Lab 02/02/25  1541   CREATININE 0.8     Serum creatinine: 0.8 mg/dL 02/02/25 1541  Estimated creatinine clearance: 82.5 mL/min    Zosyn 4.5 g Q8H will be changed to Zosyn 4.5 g Q6H    Pharmacist's Name: Oliver Molina  Pharmacist's Extension: [phone]      Sharad Azevedo MD  Department of Hospital Medicine  Formerly [facility] - Emergency Dept

## 2025-02-03 NOTE — HPI
49 year old pt getting admitted with acute resp failure/acute pneumonia  Pt started having URTI Like symptoms few days ago  He continued to smoke and stopped smoking 2 days ago  Last few days pt has been having cough and SOB  He cannot even walk towards mailbox and felt very SOB  He came to hospital today and got admitted

## 2025-02-03 NOTE — ASSESSMENT & PLAN NOTE
Patient's FSGs are uncontrolled due to hyperglycemia on current medication regimen.  Last A1c reviewed-   Lab Results   Component Value Date    HGBA1C 11.4 (H) 08/09/2024    HGBA1C 11.4 (H) 08/09/2024     Most recent fingerstick glucose reviewed-   Recent Labs   Lab 02/02/25  1535   POCTGLUCOSE 264*     Current correctional scale  Medium  Maintain anti-hyperglycemic dose as follows-   Antihyperglycemics (From admission, onward)      Start     Stop Route Frequency Ordered    02/02/25 2100  insulin glargine U-100 (Lantus) pen 15 Units         -- SubQ Nightly 02/02/25 1839    02/02/25 1938  insulin aspart U-100 pen 0-10 Units         -- SubQ Before meals & nightly PRN 02/02/25 1839          Hold Oral hypoglycemics while patient is in the hospital.

## 2025-02-03 NOTE — PROGRESS NOTES
Pharmacist Renal Dose Adjustment Note    Sergio Porter is a 49 y.o. male being treated with the medication zosyn.    Patient Data:    Vital Signs (Most Recent):  Temp: 99.7 °F (37.6 °C) (02/02/25 1516)  Pulse: (!) 114 (02/02/25 1748)  Resp: 20 (02/02/25 1748)  BP: (!) 174/82 (02/02/25 1656)  SpO2: (!) 93 % (02/02/25 1748) Vital Signs (72h Range):  Temp:  [99.7 °F (37.6 °C)]   Pulse:  [114-124]   Resp:  [20-34]   BP: (130-182)/(71-89)   SpO2:  [79 %-99 %]      Recent Labs   Lab 02/02/25  1541   CREATININE 0.8     Serum creatinine: 0.8 mg/dL 02/02/25 1541  Estimated creatinine clearance: 82.5 mL/min    Zosyn 4.5 g Q8H will be changed to Zosyn 4.5 g Q6H    Pharmacist's Name: Oliver Molina  Pharmacist's Extension: [phone]

## 2025-02-03 NOTE — CARE UPDATE
02/03/25 0731   Patient Assessment/Suction   Level of Consciousness (AVPU) alert   Respiratory Effort Normal;Unlabored   Expansion/Accessory Muscles/Retractions no use of accessory muscles   All Lung Fields Breath Sounds coarse;crackles   Rhythm/Pattern, Respiratory unlabored   Cough Frequency frequent   Cough Type congested;productive   PRE-TX-O2   Device (Oxygen Therapy) high flow nasal cannula   $ Is the patient on Low Flow Oxygen? Yes   Flow (L/min) (Oxygen Therapy) 10   SpO2 96 %   Pulse Oximetry Type Continuous   $ Pulse Oximetry - Multiple Charge Pulse Oximetry - Multiple   Pulse 104   Resp 18   BP (!) 233/112   Aerosol Therapy   $ Aerosol Therapy Charges Aerosol Treatment   Daily Review of Necessity (SVN) completed   Respiratory Treatment Status (SVN) given   Treatment Route (SVN) mouthpiece utilized   Patient Position sitting in chair   Post Treatment Assessment (SVN) increased aeration   Signs of Intolerance (SVN) none   Breath Sounds Post-Respiratory Treatment   Throughout All Fields Post-Treatment All Fields   Throughout All Fields Post-Treatment aeration increased   Post-treatment Heart Rate (beats/min) 96   Post-treatment Resp Rate (breaths/min) 20   Education   $ Education Bronchodilator;Oxygen;15 min   Tobacco Cessation Intervention   Do you use any type of tobacco product? No  (quit 5 days ago)   Respiratory Evaluation   $ Care Plan Tech Time 15 min   $ Respiratory Evaluation Complete   Evaluation For New Orders   Admitting Diagnosis acute hypercapnia resp failure   Cardiac Diagnosis pad   Pulmonary Diagnosis resp failure, pneumonia   Current Surgeries na   Home Oxygen   Has Home Oxygen? No   Home Aerosol, MDI, DPI, and Other Treatments/Therapies   Home Respiratory Therapy Per Patient/Review of Chart No   Oxygen Care Plan   Oxygen Care Plan Per Protocol   SPO2 Goal (%) MD order   Rationale SpO2 is <MD Goal   Bronchodilator Care Plan   Bronchodilator Care Plan Aerosol   Aerosol Meds w/ frequency  Atrovent(Iprotropium Bromide) 500mcg Q 8Hr;Xopenex(Levalbuterol HCL) 1.25mg Q 8Hr;Pulmicort(Budenoside) 0.5mg BID   Rationale Other  (pna)   Atelectasis Care Plan   Rationale No Rational Found   Airway Clearance Care Plan   Rationale No rationale found

## 2025-02-03 NOTE — ASSESSMENT & PLAN NOTE
- on Suboxone at home  - continue while here  - patient mentions he takes 0.5-1 sublingual films 3 times a day, we double-checked the dose with his pharmacy, ordered as 1 film BID ( cannot split the film as per pharmacy)

## 2025-02-03 NOTE — ASSESSMENT & PLAN NOTE
One dose of iv vancomycin and primary team has to decide upon further doses of iv vancomycin  Maintain iv cefepime     Antibiotics (From admission, onward)      Start     Stop Route Frequency Ordered    02/02/25 2000  ceFEPIme injection 2 g         -- IV Every 8 hours (non-standard times) 02/02/25 1845    02/02/25 1745  vancomycin (VANCOCIN) 1,000 mg in 0.9% NaCl 250 mL IVPB (admixture device)  (ED Adult Sepsis Treatment)         02/03/25 0544 IV ED 1 Time 02/02/25 1732            Microbiology Results (last 7 days)       Procedure Component Value Units Date/Time    Respiratory Infection Panel (PCR), Nasopharyngeal [[id number]] Collected: 02/02/25 1722    Order Status: Completed Specimen: Nasopharyngeal Swab Updated: 02/02/25 1726     Respiratory Infection Panel Source NP swab    Narrative:      Respiratory Infection Panel source->NP Swab    Blood culture x two cultures. Draw prior to antibiotics [[id number]] Collected: 02/02/25 1546    Order Status: Sent Specimen: Blood from Peripheral, Hand, Left Updated: 02/02/25 1558    Blood culture x two cultures. Draw prior to antibiotics [[id number]] Collected: 02/02/25 1538    Order Status: Sent Specimen: Blood from Peripheral, Forearm, Right Updated: 02/02/25 1558

## 2025-02-03 NOTE — ASSESSMENT & PLAN NOTE
Patient's FSGs are uncontrolled due to hyperglycemia on current medication regimen.  Last A1c reviewed-   Lab Results   Component Value Date    HGBA1C 11.4 (H) 08/09/2024    HGBA1C 11.4 (H) 08/09/2024     Most recent fingerstick glucose reviewed-   Recent Labs   Lab 02/03/25  0212 02/03/25  0420 02/03/25  0823 02/03/25  1112   POCTGLUCOSE 457* 399* 365* 273*       Current correctional scale  increased to high dose sliding scale based on the blood glucose this morning  Maintain anti-hyperglycemic dose as follows-   Antihyperglycemics (From admission, onward)      Start     Stop Route Frequency Ordered    02/03/25 1032  insulin aspart U-100 pen 0-15 Units         -- SubQ Before meals & nightly PRN 02/03/25 0932    02/02/25 2100  insulin glargine U-100 (Lantus) pen 15 Units         -- SubQ Nightly 02/02/25 1839          Hold Oral hypoglycemics while patient is in the hospital.

## 2025-02-03 NOTE — ASSESSMENT & PLAN NOTE
One dose of iv vancomycin and primary team has to decide upon further doses of iv vancomycin  Maintain iv cefepime   Follow sputum culture and deescalate as needed    Antibiotics (From admission, onward)      Start     Stop Route Frequency Ordered    02/02/25 2000  ceFEPIme injection 2 g         -- IV Every 8 hours (non-standard times) 02/02/25 1845            Microbiology Results (last 7 days)       Procedure Component Value Units Date/Time    Culture, Respiratory with Gram Stain [[id number]] Collected: 02/03/25 0751    Order Status: Sent Specimen: Respiratory from Sputum, Expectorated Updated: 02/03/25 0818    Blood culture x two cultures. Draw prior to antibiotics [[id number]] Collected: 02/02/25 1546    Order Status: Completed Specimen: Blood from Peripheral, Hand, Left Updated: 02/02/25 2317     Blood Culture, Routine No Growth to date    Narrative:      Aerobic and anaerobic    Blood culture x two cultures. Draw prior to antibiotics [[id number]] Collected: 02/02/25 1538    Order Status: Completed Specimen: Blood from Peripheral, Forearm, Right Updated: 02/02/25 2317     Blood Culture, Routine No Growth to date    Narrative:      Aerobic and anaerobic    Respiratory Infection Panel (PCR), Nasopharyngeal [[id number]] Collected: 02/02/25 1722    Order Status: Completed Specimen: Nasopharyngeal Swab Updated: 02/02/25 1938     Respiratory Infection Panel Source NP swab     Adenovirus Not Detected     Coronavirus 229E, Common Cold Virus Not Detected     Coronavirus HKU1, Common Cold Virus Not Detected     Coronavirus NL63, Common Cold Virus Not Detected     Coronavirus OC43, Common Cold Virus Not Detected     Comment: Coronavirus strains 229E, HKU1, NL63, and OC43 can cause the common   cold   and are not associated with the respiratory disease outbreak caused   by  the COVID-19 (SARS-CoV-2 novel Coronavirus) strain.           SARS-CoV2 (COVID-19) Qualitative PCR Not Detected     Human Metapneumovirus Not Detected      Human Rhinovirus/Enterovirus Not Detected     Influenza A Not Detected     Influenza B Not Detected     Parainfluenza Virus 1 Not Detected     Parainfluenza Virus 2 Not Detected     Parainfluenza Virus 3 Not Detected     Parainfluenza Virus 4 Not Detected     Respiratory Syncytial Virus Not Detected     Bordetella Parapertussis (LD7080) Not Detected     Bordetella pertussis (ptxP) Not Detected     Chlamydia pneumoniae Not Detected     Mycoplasma pneumoniae Not Detected     Comment: Respiratory Infection Panel testing performed by Multiplex PCR.       Narrative:      Respiratory Infection Panel source->NP Swab

## 2025-02-03 NOTE — ASSESSMENT & PLAN NOTE
- on Suboxone at home  - continue while here  - patient mentions he takes 1 - 1.5 sublingual films a day, we will call his pharmacy to double check the dose

## 2025-02-03 NOTE — PLAN OF CARE
Problem: Adult Inpatient Plan of Care  Goal: Plan of Care Review  Outcome: Progressing  Goal: Patient-Specific Goal (Individualized)  Outcome: Progressing  Goal: Absence of Hospital-Acquired Illness or Injury  Outcome: Progressing  Goal: Optimal Comfort and Wellbeing  Outcome: Progressing  Goal: Readiness for Transition of Care  Outcome: Progressing     Problem: Diabetes Comorbidity  Goal: Blood Glucose Level Within Targeted Range  Outcome: Progressing     Problem: Pneumonia  Goal: Fluid Balance  Outcome: Progressing  Goal: Resolution of Infection Signs and Symptoms  Outcome: Progressing  Goal: Effective Oxygenation and Ventilation  Outcome: Progressing     Problem: Wound  Goal: Optimal Coping  Outcome: Progressing  Goal: Optimal Functional Ability  Outcome: Progressing  Goal: Absence of Infection Signs and Symptoms  Outcome: Progressing  Goal: Improved Oral Intake  Outcome: Progressing  Goal: Optimal Pain Control and Function  Outcome: Progressing  Goal: Skin Health and Integrity  Outcome: Progressing  Goal: Optimal Wound Healing  Outcome: Progressing

## 2025-02-03 NOTE — ED NOTES
"Patient is aware of high blood glucose - patient is wearing a dexcom. Patient's dexcom is reading high. Patient still insist on drinking a coke instead of water. Also patient went to the vending machine and got skittles and chips. Patient educated on the sugar in all of the snacks he got. Patient states "I always eat like this" - patient verbalized understanding on the sugary snacks and drinks making his BG go up. MD aware.   "

## 2025-02-03 NOTE — SUBJECTIVE & OBJECTIVE
Past Medical History:   Diagnosis Date    COVID-19     around june 2022, per patient/wife    Diabetes mellitus, type 2     Dyslipidemia     Opioid abuse        Past Surgical History:   Procedure Laterality Date    ANGIOGRAM, ABDOMINAL AORTA N/A 1/27/2024    Procedure: Angiogram, Abdominal Aorta;  Surgeon: Bhavesh Pelletier MD;  Location: [facility] CATH/EP LAB;  Service: General;  Laterality: N/A;    ANGIOGRAM, EXTREMITY, BILATERAL Right 1/27/2024    Procedure: ANGIOGRAM, EXTREMITY, BILATERAL;  Surgeon: Bhavesh Pelletier MD;  Location: [facility] CATH/EP LAB;  Service: General;  Laterality: Right;    INCISION AND DRAINAGE OF ABSCESS N/A 9/16/2022    Procedure: INCISION AND DRAINAGE, ABSCESS- BACK;  Surgeon: Glenn Fraser MD;  Location: [facility] OR;  Service: General;  Laterality: N/A;    INTRAVASCULAR ULTRASOUND, NON-CORONARY N/A 1/27/2024    Procedure: Intravascular Ultrasound, Non-Coronary;  Surgeon: Bhavesh Pelletier MD;  Location: [facility] CATH/EP LAB;  Service: General;  Laterality: N/A;    STENTS, ILIAC, BILATERAL Bilateral 1/27/2024    Procedure: Stents, Iliac, Bilateral;  Surgeon: Bhavesh Pelletier MD;  Location: [facility] CATH/EP LAB;  Service: General;  Laterality: Bilateral;       Review of patient's allergies indicates:  No Known Allergies    No current facility-administered medications on file prior to encounter.     Current Outpatient Medications on File Prior to Encounter   Medication Sig    atorvastatin (LIPITOR) 10 MG tablet Take 1 tablet (10 mg total) by mouth every evening.    blood sugar diagnostic Strp To check BG 3 times daily, to use with insurance preferred meter  Dx E 11.65    blood-glucose meter kit To check BG 3 times daily, to use with insurance preferred meter DX E 11.65    buprenorphine-naloxone 8-2 mg (SUBOXONE) 8-2 mg Place 1 Film under the tongue once daily. Per patient    clopidogreL (PLAVIX) 75 mg tablet Take 1 tablet (75 mg total) by mouth once daily.    DEXCOM G7 SENSOR May 1 Device by  "Misc.(Non-Drug; Combo Route) route every 10 days.    gabapentin (NEURONTIN) 300 MG capsule Take 1 capsule (300 mg total) by mouth 2 (two) times daily.    insulin glargine U-100, Lantus, (LANTUS SOLOSTAR U-100 INSULIN) 100 unit/mL (3 mL) InPn pen Inject 15 Units into the skin every evening.    lancets Misc To check BG 3 times daily, to use with insurance preferred meter Dx E 11.65    metFORMIN (GLUCOPHAGE) 500 MG tablet Take 2 tablets (1,000 mg total) by mouth 2 (two) times daily with meals.    mirtazapine (REMERON) 15 MG tablet Take 15 mg by mouth every evening.    nicotine (NICODERM CQ) 21 mg/24 hr Place 1 patch onto the skin once daily.    nicotine polacrilex 2 MG Lozg Take 1 lozenge (2 mg total) by mouth as needed (Use in place of cigarettes/Do not exceed 5 pieces per day).    pen needle, diabetic 32 gauge x 5/32" Ndle 1 Needle by Misc.(Non-Drug; Combo Route) route 4 (four) times daily.    sildenafiL (VIAGRA) 25 MG tablet Take 1 tablet (25 mg total) by mouth daily as needed for Erectile Dysfunction.    SITagliptin phosphate (JANUVIA) 100 MG Tab Take 1 tablet (100 mg total) by mouth once daily.     Family History       Problem Relation (Age of Onset)    Diabetes Mother, Father, Maternal Grandmother, Paternal Grandmother    Heart disease Father    Hypertension Father          Tobacco Use    Smoking status: Every Day     Current packs/day: 1.00     Average packs/day: 1 pack/day for 37.1 years (36.6 ttl pk-yrs)     Types: Cigarettes     Start date: 1988     Passive exposure: Current    Smokeless tobacco: Never   Substance and Sexual Activity    Alcohol use: Not Currently    Drug use: Not Currently     Types: Marijuana, Other-see comments     Comment: pain medications/opioids    Sexual activity: Not Currently     Review of Systems   Constitutional:  Negative for activity change and appetite change.   HENT:  Negative for congestion and dental problem.    Eyes:  Negative for discharge and itching.   Respiratory:  " Positive for cough and shortness of breath.    Cardiovascular:  Positive for palpitations. Negative for chest pain.   Gastrointestinal:  Negative for abdominal distention and abdominal pain.   Endocrine: Negative for cold intolerance.   Genitourinary:  Negative for difficulty urinating and dysuria.   Musculoskeletal:  Negative for arthralgias and back pain.   Skin:  Negative for color change.   Neurological:  Negative for dizziness and facial asymmetry.   Hematological:  Negative for adenopathy.   Psychiatric/Behavioral:  Negative for agitation and behavioral problems.      Objective:     Vital Signs (Most Recent):  Temp: 99.7 °F (37.6 °C) (02/02/25 1516)  Pulse: (!) 114 (02/02/25 1748)  Resp: 20 (02/02/25 1748)  BP: (!) 174/82 (02/02/25 1656)  SpO2: (!) 93 % (02/02/25 1748) Vital Signs (24h Range):  Temp:  [99.7 °F (37.6 °C)] 99.7 °F (37.6 °C)  Pulse:  [114-124] 114  Resp:  [20-34] 20  SpO2:  [79 %-99 %] 93 %  BP: (130-182)/(71-89) 174/82     Weight: 52.2 kg (115 lb)  Body mass index is 18.01 kg/m².     Physical Exam  Vitals and nursing note reviewed.   Constitutional:       Appearance: He is well-developed.   HENT:      Head: Atraumatic.      Right Ear: External ear normal.      Left Ear: External ear normal.      Nose: Nose normal.      Mouth/Throat:      Mouth: Mucous membranes are moist.   Eyes:      Extraocular Movements: Extraocular movements intact.   Cardiovascular:      Rate and Rhythm: Regular rhythm. Tachycardia present.   Pulmonary:      Effort: Pulmonary effort is normal.   Abdominal:      Palpations: Abdomen is soft.   Musculoskeletal:         General: Normal range of motion.      Cervical back: Full passive range of motion without pain and normal range of motion.   Skin:     General: Skin is warm.   Neurological:      Mental Status: He is alert and oriented to person, place, and time.   Psychiatric:         Behavior: Behavior normal.                Significant Labs: All pertinent labs within the past  24 hours have been reviewed.  CBC:   Recent Labs   Lab 02/02/25  1541   WBC 13.02*   HGB 12.1*   HCT 38.6*        CMP:   Recent Labs   Lab 02/02/25  1541      K 3.8   CL 94*   CO2 30*   *   BUN 21*   CREATININE 0.8   CALCIUM 9.5   PROT 7.5   ALBUMIN 3.6   BILITOT 0.6   ALKPHOS 135   AST 21   ALT 15   ANIONGAP 12       Significant Imaging: I have reviewed all pertinent imaging results/findings within the past 24 hours.

## 2025-02-03 NOTE — ASSESSMENT & PLAN NOTE
Patient has a current diagnosis of hypertensive urgency (without evidence of end organ damage) which is uncontrolled.  Latest blood pressure and vitals reviewed-   Temp:  [99.7 °F (37.6 °C)]   Pulse:  []   Resp:  [17-34]   BP: (130-233)/()   SpO2:  [70 %-100 %] .   Patient currently off IV antihypertensives.   Home meds for hypertension were reviewed and noted below.   Not on any antihypertensives at home    Medication adjustment for hospital antihypertensives is as follows-likely also from his withdrawal, restart Suboxone, IV labetalol p.r.n., if does not improve can schedule Coreg      Will aim for controlled BP reduction by medications noted above. Monitor and mitigate end organ damage as indicated.

## 2025-02-04 LAB
ALBUMIN SERPL BCP-MCNC: 3.1 G/DL (ref 3.5–5.2)
ALP SERPL-CCNC: 127 U/L (ref 55–135)
ALT SERPL W/O P-5'-P-CCNC: 16 U/L (ref 10–44)
ANION GAP SERPL CALC-SCNC: 7 MMOL/L (ref 8–16)
AST SERPL-CCNC: 16 U/L (ref 10–40)
BASOPHILS NFR BLD: 0 % (ref 0–1.9)
BILIRUB SERPL-MCNC: 0.3 MG/DL (ref 0.1–1)
BUN SERPL-MCNC: 13 MG/DL (ref 6–20)
CALCIUM SERPL-MCNC: 8.6 MG/DL (ref 8.7–10.5)
CHLORIDE SERPL-SCNC: 99 MMOL/L (ref 95–110)
CO2 SERPL-SCNC: 30 MMOL/L (ref 23–29)
CREAT SERPL-MCNC: 0.7 MG/DL (ref 0.5–1.4)
DIFFERENTIAL METHOD BLD: ABNORMAL
EOSINOPHIL NFR BLD: 0 % (ref 0–8)
ERYTHROCYTE [DISTWIDTH] IN BLOOD BY AUTOMATED COUNT: 15.3 % (ref 11.5–14.5)
EST. GFR  (NO RACE VARIABLE): >60 ML/MIN/1.73 M^2
ESTIMATED AVG GLUCOSE: 197 MG/DL (ref 68–131)
GLUCOSE SERPL-MCNC: 313 MG/DL (ref 70–110)
HBA1C MFR BLD: 8.5 % (ref 4.5–6.2)
HCT VFR BLD AUTO: 35.5 % (ref 40–54)
HGB BLD-MCNC: 11.1 G/DL (ref 14–18)
IMM GRANULOCYTES # BLD AUTO: ABNORMAL K/UL (ref 0–0.04)
IMM GRANULOCYTES NFR BLD AUTO: ABNORMAL % (ref 0–0.5)
LYMPHOCYTES NFR BLD: 23 % (ref 18–48)
MAGNESIUM SERPL-MCNC: 2 MG/DL (ref 1.6–2.6)
MCH RBC QN AUTO: 24.9 PG (ref 27–31)
MCHC RBC AUTO-ENTMCNC: 31.3 G/DL (ref 32–36)
MCV RBC AUTO: 80 FL (ref 82–98)
MONOCYTES NFR BLD: 6 % (ref 4–15)
NEUTROPHILS NFR BLD: 59 % (ref 38–73)
NEUTS BAND NFR BLD MANUAL: 12 %
NRBC BLD-RTO: 0 /100 WBC
PLATELET # BLD AUTO: 344 K/UL (ref 150–450)
PLATELET BLD QL SMEAR: ABNORMAL
PMV BLD AUTO: 8.7 FL (ref 9.2–12.9)
POCT GLUCOSE: 108 MG/DL (ref 70–110)
POCT GLUCOSE: 287 MG/DL (ref 70–110)
POCT GLUCOSE: 73 MG/DL (ref 70–110)
POTASSIUM SERPL-SCNC: 3.7 MMOL/L (ref 3.5–5.1)
PROT SERPL-MCNC: 6.4 G/DL (ref 6–8.4)
RBC # BLD AUTO: 4.45 M/UL (ref 4.6–6.2)
SODIUM SERPL-SCNC: 136 MMOL/L (ref 136–145)
WBC # BLD AUTO: 16.49 K/UL (ref 3.9–12.7)

## 2025-02-04 PROCEDURE — 25000242 PHARM REV CODE 250 ALT 637 W/ HCPCS: Performed by: INTERNAL MEDICINE

## 2025-02-04 PROCEDURE — 63600175 PHARM REV CODE 636 W HCPCS: Performed by: INTERNAL MEDICINE

## 2025-02-04 PROCEDURE — 99900035 HC TECH TIME PER 15 MIN (STAT)

## 2025-02-04 PROCEDURE — 94618 PULMONARY STRESS TESTING: CPT

## 2025-02-04 PROCEDURE — 36415 COLL VENOUS BLD VENIPUNCTURE: CPT | Performed by: INTERNAL MEDICINE

## 2025-02-04 PROCEDURE — 94640 AIRWAY INHALATION TREATMENT: CPT

## 2025-02-04 PROCEDURE — 80053 COMPREHEN METABOLIC PANEL: CPT | Performed by: INTERNAL MEDICINE

## 2025-02-04 PROCEDURE — 85027 COMPLETE CBC AUTOMATED: CPT | Performed by: INTERNAL MEDICINE

## 2025-02-04 PROCEDURE — 63600175 PHARM REV CODE 636 W HCPCS

## 2025-02-04 PROCEDURE — S4991 NICOTINE PATCH NONLEGEND: HCPCS | Performed by: INTERNAL MEDICINE

## 2025-02-04 PROCEDURE — 25000003 PHARM REV CODE 250: Performed by: INTERNAL MEDICINE

## 2025-02-04 PROCEDURE — 21400001 HC TELEMETRY ROOM

## 2025-02-04 PROCEDURE — 94761 N-INVAS EAR/PLS OXIMETRY MLT: CPT

## 2025-02-04 PROCEDURE — 99900031 HC PATIENT EDUCATION (STAT)

## 2025-02-04 PROCEDURE — 27000221 HC OXYGEN, UP TO 24 HOURS

## 2025-02-04 PROCEDURE — 85007 BL SMEAR W/DIFF WBC COUNT: CPT | Performed by: INTERNAL MEDICINE

## 2025-02-04 PROCEDURE — 83735 ASSAY OF MAGNESIUM: CPT | Performed by: INTERNAL MEDICINE

## 2025-02-04 RX ORDER — IBUPROFEN 200 MG
1 TABLET ORAL DAILY
Status: DISCONTINUED | OUTPATIENT
Start: 2025-02-04 | End: 2025-02-06 | Stop reason: HOSPADM

## 2025-02-04 RX ORDER — INSULIN GLARGINE 100 [IU]/ML
22 INJECTION, SOLUTION SUBCUTANEOUS NIGHTLY
Status: DISCONTINUED | OUTPATIENT
Start: 2025-02-04 | End: 2025-02-06 | Stop reason: HOSPADM

## 2025-02-04 RX ORDER — HYDRALAZINE HYDROCHLORIDE 25 MG/1
50 TABLET, FILM COATED ORAL ONCE
Status: COMPLETED | OUTPATIENT
Start: 2025-02-04 | End: 2025-02-04

## 2025-02-04 RX ORDER — LISINOPRIL 10 MG/1
10 TABLET ORAL DAILY
Status: DISCONTINUED | OUTPATIENT
Start: 2025-02-04 | End: 2025-02-05

## 2025-02-04 RX ADMIN — BUPRENORPHINE AND NALOXONE 1 FILM: 8; 2 FILM BUCCAL; SUBLINGUAL at 09:02

## 2025-02-04 RX ADMIN — LISINOPRIL 10 MG: 10 TABLET ORAL at 09:02

## 2025-02-04 RX ADMIN — LEVALBUTEROL HYDROCHLORIDE 1.25 MG: 1.25 SOLUTION RESPIRATORY (INHALATION) at 07:02

## 2025-02-04 RX ADMIN — BUPRENORPHINE AND NALOXONE 1 FILM: 8; 2 FILM BUCCAL; SUBLINGUAL at 08:02

## 2025-02-04 RX ADMIN — LABETALOL HYDROCHLORIDE 10 MG: 5 INJECTION, SOLUTION INTRAVENOUS at 12:02

## 2025-02-04 RX ADMIN — IPRATROPIUM BROMIDE 0.5 MG: 0.5 SOLUTION RESPIRATORY (INHALATION) at 01:02

## 2025-02-04 RX ADMIN — FAMOTIDINE 20 MG: 20 TABLET ORAL at 08:02

## 2025-02-04 RX ADMIN — CEFEPIME 2 G: 2 INJECTION, POWDER, FOR SOLUTION INTRAVENOUS at 05:02

## 2025-02-04 RX ADMIN — CLOPIDOGREL BISULFATE 75 MG: 75 TABLET, FILM COATED ORAL at 08:02

## 2025-02-04 RX ADMIN — ATORVASTATIN CALCIUM 10 MG: 10 TABLET, FILM COATED ORAL at 09:02

## 2025-02-04 RX ADMIN — CEFEPIME 2 G: 2 INJECTION, POWDER, FOR SOLUTION INTRAVENOUS at 12:02

## 2025-02-04 RX ADMIN — BUDESONIDE INHALATION 0.5 MG: 0.5 SUSPENSION RESPIRATORY (INHALATION) at 07:02

## 2025-02-04 RX ADMIN — IPRATROPIUM BROMIDE 0.5 MG: 0.5 SOLUTION RESPIRATORY (INHALATION) at 07:02

## 2025-02-04 RX ADMIN — HYDRALAZINE HYDROCHLORIDE 50 MG: 25 TABLET ORAL at 05:02

## 2025-02-04 RX ADMIN — FAMOTIDINE 20 MG: 20 TABLET ORAL at 09:02

## 2025-02-04 RX ADMIN — CEFEPIME 2 G: 2 INJECTION, POWDER, FOR SOLUTION INTRAVENOUS at 07:02

## 2025-02-04 RX ADMIN — INSULIN ASPART 9 UNITS: 100 INJECTION, SOLUTION INTRAVENOUS; SUBCUTANEOUS at 08:02

## 2025-02-04 RX ADMIN — SODIUM CHLORIDE: 9 INJECTION, SOLUTION INTRAVENOUS at 05:02

## 2025-02-04 RX ADMIN — LABETALOL HYDROCHLORIDE 10 MG: 5 INJECTION, SOLUTION INTRAVENOUS at 08:02

## 2025-02-04 RX ADMIN — LEVALBUTEROL HYDROCHLORIDE 1.25 MG: 1.25 SOLUTION RESPIRATORY (INHALATION) at 01:02

## 2025-02-04 RX ADMIN — GUAIFENESIN AND DEXTROMETHORPHAN HYDROBROMIDE 1 TABLET: 600; 30 TABLET, EXTENDED RELEASE ORAL at 09:02

## 2025-02-04 RX ADMIN — POTASSIUM BICARBONATE 50 MEQ: 978 TABLET, EFFERVESCENT ORAL at 08:02

## 2025-02-04 RX ADMIN — NICOTINE 1 PATCH: 14 PATCH, EXTENDED RELEASE TRANSDERMAL at 12:02

## 2025-02-04 RX ADMIN — INSULIN GLARGINE 22 UNITS: 100 INJECTION, SOLUTION SUBCUTANEOUS at 09:02

## 2025-02-04 NOTE — ASSESSMENT & PLAN NOTE
One dose of iv vancomycin and primary team has to decide upon further doses of iv vancomycin  Maintain iv cefepime   Follow sputum culture and deescalate as needed    Antibiotics (From admission, onward)    Start     Stop Route Frequency Ordered    02/02/25 2000  ceFEPIme injection 2 g         -- IV Every 8 hours (non-standard times) 02/02/25 1845          Microbiology Results (last 7 days)     Procedure Component Value Units Date/Time    Culture, Respiratory with Gram Stain [[id number]] Collected: 02/03/25 0751    Order Status: Completed Specimen: Respiratory from Sputum, Expectorated Updated: 02/04/25 0821     Respiratory Culture Normal respiratory kushal     Gram Stain (Respiratory) Moderate WBC's     Gram Stain (Respiratory) <10 epithelial cells per low power field.     Gram Stain (Respiratory) Few Gram positive cocci     Gram Stain (Respiratory) Rare Gram positive rods     Gram Stain (Respiratory) Rare Gram negative rods    Blood culture x two cultures. Draw prior to antibiotics [[id number]] Collected: 02/02/25 1538    Order Status: Completed Specimen: Blood from Peripheral, Forearm, Right Updated: 02/03/25 1632     Blood Culture, Routine No Growth to date      No Growth to date    Narrative:      Aerobic and anaerobic    Blood culture x two cultures. Draw prior to antibiotics [[id number]] Collected: 02/02/25 1546    Order Status: Completed Specimen: Blood from Peripheral, Hand, Left Updated: 02/03/25 1632     Blood Culture, Routine No Growth to date      No Growth to date    Narrative:      Aerobic and anaerobic    Respiratory Infection Panel (PCR), Nasopharyngeal [[id number]] Collected: 02/02/25 1722    Order Status: Completed Specimen: Nasopharyngeal Swab Updated: 02/02/25 1938     Respiratory Infection Panel Source NP swab     Adenovirus Not Detected     Coronavirus 229E, Common Cold Virus Not Detected     Coronavirus HKU1, Common Cold Virus Not Detected     Coronavirus NL63, Common Cold Virus Not Detected      Coronavirus OC43, Common Cold Virus Not Detected     Comment: Coronavirus strains 229E, HKU1, NL63, and OC43 can cause the common   cold   and are not associated with the respiratory disease outbreak caused   by  the COVID-19 (SARS-CoV-2 novel Coronavirus) strain.           SARS-CoV2 (COVID-19) Qualitative PCR Not Detected     Human Metapneumovirus Not Detected     Human Rhinovirus/Enterovirus Not Detected     Influenza A Not Detected     Influenza B Not Detected     Parainfluenza Virus 1 Not Detected     Parainfluenza Virus 2 Not Detected     Parainfluenza Virus 3 Not Detected     Parainfluenza Virus 4 Not Detected     Respiratory Syncytial Virus Not Detected     Bordetella Parapertussis (KD2380) Not Detected     Bordetella pertussis (ptxP) Not Detected     Chlamydia pneumoniae Not Detected     Mycoplasma pneumoniae Not Detected     Comment: Respiratory Infection Panel testing performed by Multiplex PCR.       Narrative:      Respiratory Infection Panel source->NP Swab

## 2025-02-04 NOTE — ASSESSMENT & PLAN NOTE
Patient's FSGs are uncontrolled due to hyperglycemia on current medication regimen.  Last A1c reviewed-   Lab Results   Component Value Date    HGBA1C 8.5 (H) 02/03/2025     Most recent fingerstick glucose reviewed-   Recent Labs   Lab 02/03/25  1112 02/03/25  1721 02/03/25  2030 02/04/25  0746   POCTGLUCOSE 273* 325* 152* 287*       Current correctional scale  increased to high dose sliding scale based on the blood glucose this morning  Maintain anti-hyperglycemic dose as follows-   Antihyperglycemics (From admission, onward)      Start     Stop Route Frequency Ordered    02/04/25 2100  insulin glargine U-100 (Lantus) pen 22 Units         -- SubQ Nightly 02/04/25 0908    02/03/25 1032  insulin aspart U-100 pen 0-15 Units         -- SubQ Before meals & nightly PRN 02/03/25 0932          Hold Oral hypoglycemics while patient is in the hospital.

## 2025-02-04 NOTE — NURSING
2120: Contacted Dr. Villarreal about pt's elevated BP. Pt /97 HR 84. Pt was given 10 mg IV Labetalol at 1725 by day shift RN. PRN BP med is q 6hrs. Can;t have again until 2325. Per Dr. Villarreal wait until next allowed dose of Labetalol.    0000: Pt /95 10mg PRN IV labetalol given.    0354: BP recheck 197/97. Messaged Dr. Villarreal about elevated BP one time dose 50mg PO Hydralazine ordered.  0500: One time dose hydralazine administered at this time.

## 2025-02-04 NOTE — CARE UPDATE
02/03/25 2025   Patient Assessment/Suction   Level of Consciousness (AVPU) alert   Respiratory Effort Unlabored   Expansion/Accessory Muscles/Retractions no use of accessory muscles   All Lung Fields Breath Sounds equal bilaterally;diminished   Rhythm/Pattern, Respiratory unlabored   Cough Frequency infrequent   Skin Integrity   $ Wound Care Tech Time 15 min   Area Observed Left;Right;Behind ear   Skin Appearance without discoloration   PRE-TX-O2   Device (Oxygen Therapy) nasal cannula   Flow (L/min) (Oxygen Therapy) 2   SpO2 (!) 92 %   Pulse Oximetry Type Intermittent   $ Pulse Oximetry - Multiple Charge Pulse Oximetry - Multiple   Pulse 83   Resp 18   Aerosol Therapy   $ Aerosol Therapy Charges Aerosol Treatment   Daily Review of Necessity (SVN) completed   Respiratory Treatment Status (SVN) given   Treatment Route (SVN) mask   Patient Position HOB elevated   Post Treatment Assessment (SVN) breath sounds unchanged   Signs of Intolerance (SVN) none   Breath Sounds Post-Respiratory Treatment   Throughout All Fields Post-Treatment All Fields   Throughout All Fields Post-Treatment no change   Post-treatment Heart Rate (beats/min) 84   Post-treatment Resp Rate (breaths/min) 18   Education   $ Education Bronchodilator;15 min

## 2025-02-04 NOTE — ASSESSMENT & PLAN NOTE
Patient with Hypercapnic Respiratory failure which is Acute.  he is not on home oxygen. Supplemental oxygen was provided and noted-      .   Signs/symptoms of respiratory failure include- tachypnea, increased work of breathing, respiratory distress, use of accessory muscles, and lethargy. Contributing diagnoses includes - COPD and Pneumonia Labs and images were reviewed. Patient Has recent ABG, which has been reviewed. Will treat underlying causes and adjust management of respiratory failure as follows-     IV antibiotics  DuoNebs  CTA chest results reviewed.  If no improvement will consult Pulmonary Medicine.

## 2025-02-04 NOTE — ASSESSMENT & PLAN NOTE
Smoking cessation counseling performed. Dangers of cigarette smoking were reviewed with patient in detail and patient was encouraged to quit. Nicotine replacement options were discussed for > 3 minutes.  Patient agreeable to receive Nicoderm.

## 2025-02-04 NOTE — ASSESSMENT & PLAN NOTE
Patient has a current diagnosis of hypertensive urgency (without evidence of end organ damage) which is uncontrolled.  Latest blood pressure and vitals reviewed-   Temp:  [97.8 °F (36.6 °C)-98 °F (36.7 °C)]   Pulse:  []   Resp:  [16-20]   BP: (174-202)/(85-97)   SpO2:  [84 %-97 %] .   Patient currently off IV antihypertensives.   Home meds for hypertension were reviewed and noted below.   Not on any antihypertensives at home    Start lisinopril 10 mg p.o. q.day.  Reduced IV fluid hydration to 75 cc an hour.

## 2025-02-04 NOTE — PROGRESS NOTES
Critical access hospital Medicine  Progress Note    Patient Name: Sergio Porter  MRN: [id number]  Patient Class: IP- Inpatient   Admission Date: 2/2/2025  Length of Stay: 2 days  Attending Physician: Karo Espinal MD  Primary Care Provider: Corie, Primary Doctor        Subjective     Principal Problem:Acute hypercapnic respiratory failure        HPI:  49 year old pt getting admitted with acute resp failure/acute pneumonia  Pt started having URTI Like symptoms few days ago  He continued to smoke and stopped smoking 2 days ago  Last few days pt has been having cough and SOB  He cannot even walk towards mailbox and felt very SOB  He came to hospital today and got admitted     Overview/Hospital Course:  Mr. Porter is a 49-year-old male admitted for respiratory distress likely secondary to pneumonia.  He was started on IV vancomycin and cefepime.  Respiratory viral panel negative.  Blood cultures and sputum cultures were closely followed. He also had episodes of hypertensive urgency that required IV labetalol.  Patient continued to have tachycardia, tachypnea and hypoxia with respiratory distress and CTA PE protocol was ordered that was negative for PE.  Also had episodes of hyperglycemia requiring adjustment in insulin regimen.      Interval History:  Patient is seen and examined this morning.  He was found taking Suboxone from his while at, educated him about the risks of taking any medication without Staff knowledge.  I handed them to his nurse to log them safely.  We explained that we shall order it here and he verbalized understanding.  CTA PE protocol ordered for continued hypoxia/tachypnea sinus tachycardia. Hyperglycemia to 391, increased to high-dose sliding scale.  White count today normal.    Hypertensive urgency, no significant signs or symptoms, labetalol administered. Regarding his hypertension overnight- As per ED nurse, patient likes to walk around and always bends his arm while checking the blood  pressure and hence those readings.      Review of Systems negative  Objective:     Vital Signs (Most Recent):  Temp: 97.8 °F (36.6 °C) (02/04/25 0710)  Pulse: 91 (02/04/25 0741)  Resp: 18 (02/04/25 0741)  BP: (!) 188/91 (02/04/25 0710)  SpO2: 95 % (02/04/25 0741) Vital Signs (24h Range):  Temp:  [97.8 °F (36.6 °C)-98 °F (36.7 °C)] 97.8 °F (36.6 °C)  Pulse:  [] 91  Resp:  [16-20] 18  SpO2:  [84 %-97 %] 95 %  BP: (174-202)/(85-97) 188/91     Weight: 52.2 kg (115 lb)  Body mass index is 18.01 kg/m².    Intake/Output Summary (Last 24 hours) at 2/4/2025 0904  Last data filed at 2/4/2025 0354  Gross per 24 hour   Intake --   Output 2750 ml   Net -2750 ml         Physical Exam  Vitals and nursing note reviewed.   Constitutional:       Appearance: He is well-developed.   HENT:      Head: Atraumatic.      Right Ear: External ear normal.      Left Ear: External ear normal.      Nose: Nose normal.      Mouth/Throat:      Mouth: Mucous membranes are moist.   Eyes:      Extraocular Movements: Extraocular movements intact.   Cardiovascular:      Rate and Rhythm: Regular rhythm. Tachycardia present.   Pulmonary:      Effort: Pulmonary effort is normal.   Abdominal:      Palpations: Abdomen is soft.   Musculoskeletal:         General: Normal range of motion.      Cervical back: Full passive range of motion without pain and normal range of motion.   Skin:     General: Skin is warm.   Neurological:      Mental Status: He is alert and oriented to person, place, and time.   Psychiatric:         Behavior: Behavior normal.             Significant Labs:   Lab Results   Component Value Date    WBC 16.49 (H) 02/04/2025    HGB 11.1 (L) 02/04/2025    HCT 35.5 (L) 02/04/2025    MCV 80 (L) 02/04/2025     02/04/2025       CMP  Sodium   Date Value Ref Range Status   02/04/2025 136 136 - 145 mmol/L Final     Potassium   Date Value Ref Range Status   02/04/2025 3.7 3.5 - 5.1 mmol/L Final     Chloride   Date Value Ref Range Status    02/04/2025 99 95 - 110 mmol/L Final     CO2   Date Value Ref Range Status   02/04/2025 30 (H) 23 - 29 mmol/L Final     Glucose   Date Value Ref Range Status   02/04/2025 313 (H) 70 - 110 mg/dL Final     BUN   Date Value Ref Range Status   02/04/2025 13 6 - 20 mg/dL Final     Creatinine   Date Value Ref Range Status   02/04/2025 0.7 0.5 - 1.4 mg/dL Final     Calcium   Date Value Ref Range Status   02/04/2025 8.6 (L) 8.7 - 10.5 mg/dL Final     Total Protein   Date Value Ref Range Status   02/04/2025 6.4 6.0 - 8.4 g/dL Final     Albumin   Date Value Ref Range Status   02/04/2025 3.1 (L) 3.5 - 5.2 g/dL Final     Total Bilirubin   Date Value Ref Range Status   02/04/2025 0.3 0.1 - 1.0 mg/dL Final     Comment:     For infants and newborns, interpretation of results should be based  on gestational age, weight and in agreement with clinical  observations.    Premature Infant recommended reference ranges:  Up to 24 hours.............<8.0 mg/dL  Up to 48 hours............<12.0 mg/dL  3-5 days..................<15.0 mg/dL  6-29 days.................<15.0 mg/dL       Alkaline Phosphatase   Date Value Ref Range Status   02/04/2025 127 55 - 135 U/L Final     AST   Date Value Ref Range Status   02/04/2025 16 10 - 40 U/L Final     ALT   Date Value Ref Range Status   02/04/2025 16 10 - 44 U/L Final     Anion Gap   Date Value Ref Range Status   02/04/2025 7 (L) 8 - 16 mmol/L Final     eGFR   Date Value Ref Range Status   02/04/2025 >60.0 >60 mL/min/1.73 m^2 Final     Microbiology Results (last 7 days)       Procedure Component Value Units Date/Time    Culture, Respiratory with Gram Stain [[id number]] Collected: 02/03/25 0759    Order Status: Completed Specimen: Respiratory from Sputum, Expectorated Updated: 02/04/25 0887     Respiratory Culture Normal respiratory kushal     Gram Stain (Respiratory) Moderate WBC's     Gram Stain (Respiratory) <10 epithelial cells per low power field.     Gram Stain (Respiratory) Few Gram positive  cocci     Gram Stain (Respiratory) Rare Gram positive rods     Gram Stain (Respiratory) Rare Gram negative rods    Blood culture x two cultures. Draw prior to antibiotics [[id number]] Collected: 02/02/25 1538    Order Status: Completed Specimen: Blood from Peripheral, Forearm, Right Updated: 02/03/25 1632     Blood Culture, Routine No Growth to date      No Growth to date    Narrative:      Aerobic and anaerobic    Blood culture x two cultures. Draw prior to antibiotics [[id number]] Collected: 02/02/25 1546    Order Status: Completed Specimen: Blood from Peripheral, Hand, Left Updated: 02/03/25 1632     Blood Culture, Routine No Growth to date      No Growth to date    Narrative:      Aerobic and anaerobic    Respiratory Infection Panel (PCR), Nasopharyngeal [[id number]] Collected: 02/02/25 1722    Order Status: Completed Specimen: Nasopharyngeal Swab Updated: 02/02/25 1938     Respiratory Infection Panel Source NP swab     Adenovirus Not Detected     Coronavirus 229E, Common Cold Virus Not Detected     Coronavirus HKU1, Common Cold Virus Not Detected     Coronavirus NL63, Common Cold Virus Not Detected     Coronavirus OC43, Common Cold Virus Not Detected     Comment: Coronavirus strains 229E, HKU1, NL63, and OC43 can cause the common   cold   and are not associated with the respiratory disease outbreak caused   by  the COVID-19 (SARS-CoV-2 novel Coronavirus) strain.           SARS-CoV2 (COVID-19) Qualitative PCR Not Detected     Human Metapneumovirus Not Detected     Human Rhinovirus/Enterovirus Not Detected     Influenza A Not Detected     Influenza B Not Detected     Parainfluenza Virus 1 Not Detected     Parainfluenza Virus 2 Not Detected     Parainfluenza Virus 3 Not Detected     Parainfluenza Virus 4 Not Detected     Respiratory Syncytial Virus Not Detected     Bordetella Parapertussis ([id number]) Not Detected     Bordetella pertussis (ptxP) Not Detected     Chlamydia pneumoniae Not Detected     Mycoplasma  pneumoniae Not Detected     Comment: Respiratory Infection Panel testing performed by Multiplex PCR.       Narrative:      Respiratory Infection Panel source->NP Swab        Significant Imaging:   CXR: No acute cardiopulmonary abnormality.     CT Chest without contrast:  Innumerable subcentimeter bilateral multifocal noncalcified nodular opacities, some of which are in a tree-in-bud pattern.  Findings are compatible with infectious/inflammatory process.  Short interval follow-up chest CT advised.  No focal consolidation, pleural effusion, or pneumothorax.  Nonspecific mediastinal lymphadenopathy.  Attention on follow-up.    CTA Chest:  1. No evidence of pulmonary embolism to the segmental arterial level. If there is continued clinical concern, consider further evaluation with lower extremity doppler.  2.  Innumerable scattered punctate nodular densities throughout both lungs, similar to the previous exam as above.  3.  Mildly enlarged mediastinal/hilar lymph nodes, likely reactive/inflammatory.  4.  Mild circumferential bronchial wall thickening with a central hilar lower lobe predominance suggesting mild bronchitis.    Assessment and Plan     * Acute hypercapnic respiratory failure  Patient with Hypercapnic Respiratory failure which is Acute.  he is not on home oxygen. Supplemental oxygen was provided and noted-      .   Signs/symptoms of respiratory failure include- tachypnea, increased work of breathing, respiratory distress, use of accessory muscles, and lethargy. Contributing diagnoses includes - COPD and Pneumonia Labs and images were reviewed. Patient Has recent ABG, which has been reviewed. Will treat underlying causes and adjust management of respiratory failure as follows-     IV antibiotics  DuoNebs  CTA chest results reviewed.  If no improvement will consult Pulmonary Medicine.    Opiate use  - on Suboxone at home  - continue while here  - patient mentions he takes 0.5-1 sublingual films 3 times a day,  we double-checked the dose with his pharmacy, ordered as 1 film BID ( cannot split the film as per pharmacy)      Hypertensive urgency  Patient has a current diagnosis of hypertensive urgency (without evidence of end organ damage) which is uncontrolled.  Latest blood pressure and vitals reviewed-   Temp:  [97.8 °F (36.6 °C)-98 °F (36.7 °C)]   Pulse:  []   Resp:  [16-20]   BP: (174-202)/(85-97)   SpO2:  [84 %-97 %] .   Patient currently off IV antihypertensives.   Home meds for hypertension were reviewed and noted below.   Not on any antihypertensives at home    Start lisinopril 10 mg p.o. q.day.  Reduced IV fluid hydration to 75 cc an hour.    Sinus tachycardia  Resolved.      PVD (peripheral vascular disease)  Maintain statins and antiplatelets      Acute pneumonia  One dose of iv vancomycin and primary team has to decide upon further doses of iv vancomycin  Maintain iv cefepime   Follow sputum culture and deescalate as needed    Antibiotics (From admission, onward)      Start     Stop Route Frequency Ordered    02/02/25 2000  ceFEPIme injection 2 g         -- IV Every 8 hours (non-standard times) 02/02/25 1845            Microbiology Results (last 7 days)       Procedure Component Value Units Date/Time    Culture, Respiratory with Gram Stain [[id number]] Collected: 02/03/25 0751    Order Status: Completed Specimen: Respiratory from Sputum, Expectorated Updated: 02/04/25 0821     Respiratory Culture Normal respiratory kushal     Gram Stain (Respiratory) Moderate WBC's     Gram Stain (Respiratory) <10 epithelial cells per low power field.     Gram Stain (Respiratory) Few Gram positive cocci     Gram Stain (Respiratory) Rare Gram positive rods     Gram Stain (Respiratory) Rare Gram negative rods    Blood culture x two cultures. Draw prior to antibiotics [[id number]] Collected: 02/02/25 1538    Order Status: Completed Specimen: Blood from Peripheral, Forearm, Right Updated: 02/03/25 1632     Blood Culture,  Routine No Growth to date      No Growth to date    Narrative:      Aerobic and anaerobic    Blood culture x two cultures. Draw prior to antibiotics [[id number]] Collected: 02/02/25 1546    Order Status: Completed Specimen: Blood from Peripheral, Hand, Left Updated: 02/03/25 1632     Blood Culture, Routine No Growth to date      No Growth to date    Narrative:      Aerobic and anaerobic    Respiratory Infection Panel (PCR), Nasopharyngeal [[id number]] Collected: 02/02/25 1722    Order Status: Completed Specimen: Nasopharyngeal Swab Updated: 02/02/25 1938     Respiratory Infection Panel Source NP swab     Adenovirus Not Detected     Coronavirus 229E, Common Cold Virus Not Detected     Coronavirus HKU1, Common Cold Virus Not Detected     Coronavirus NL63, Common Cold Virus Not Detected     Coronavirus OC43, Common Cold Virus Not Detected     Comment: Coronavirus strains 229E, HKU1, NL63, and OC43 can cause the common   cold   and are not associated with the respiratory disease outbreak caused   by  the COVID-19 (SARS-CoV-2 novel Coronavirus) strain.           SARS-CoV2 (COVID-19) Qualitative PCR Not Detected     Human Metapneumovirus Not Detected     Human Rhinovirus/Enterovirus Not Detected     Influenza A Not Detected     Influenza B Not Detected     Parainfluenza Virus 1 Not Detected     Parainfluenza Virus 2 Not Detected     Parainfluenza Virus 3 Not Detected     Parainfluenza Virus 4 Not Detected     Respiratory Syncytial Virus Not Detected     Bordetella Parapertussis ([id number]) Not Detected     Bordetella pertussis (ptxP) Not Detected     Chlamydia pneumoniae Not Detected     Mycoplasma pneumoniae Not Detected     Comment: Respiratory Infection Panel testing performed by Multiplex PCR.       Narrative:      Respiratory Infection Panel source->NP Swab            Medical non-compliance  Per chart review       Tobacco dependence  Smoking cessation counseling performed. Dangers of cigarette smoking were reviewed with  patient in detail and patient was encouraged to quit. Nicotine replacement options were discussed for > 3 minutes.  Patient agreeable to receive Nicoderm.      Type 2 diabetes mellitus without complication  Patient's FSGs are uncontrolled due to hyperglycemia on current medication regimen.  Last A1c reviewed-   Lab Results   Component Value Date    HGBA1C 8.5 (H) 02/03/2025     Most recent fingerstick glucose reviewed-   Recent Labs   Lab 02/03/25  1112 02/03/25  1721 02/03/25  2030 02/04/25  0746   POCTGLUCOSE 273* 325* 152* 287*       Current correctional scale  increased to high dose sliding scale based on the blood glucose this morning  Maintain anti-hyperglycemic dose as follows-   Antihyperglycemics (From admission, onward)      Start     Stop Route Frequency Ordered    02/04/25 2100  insulin glargine U-100 (Lantus) pen 22 Units         -- SubQ Nightly 02/04/25 0908    02/03/25 1032  insulin aspart U-100 pen 0-15 Units         -- SubQ Before meals & nightly PRN 02/03/25 0932          Hold Oral hypoglycemics while patient is in the hospital.        VTE Risk Mitigation (From admission, onward)           Ordered     enoxaparin injection 40 mg  Daily         02/02/25 1839     IP VTE HIGH RISK PATIENT  Once         02/02/25 1839     Place sequential compression device  Until discontinued         02/02/25 1839                    Discharge Planning   MADDIE: 2/6/2025     Code Status: Full Code   Medical Readiness for Discharge Date:   Discharge Plan A: Home with family                        Karo Espinal MD  Department of Hospital Medicine   [facility]

## 2025-02-04 NOTE — SUBJECTIVE & OBJECTIVE
Interval History:  Patient is seen and examined during multidisciplinary round.  Patient is currently on 3 liter/minute supplemental oxygen via nasal cannula.  Patient reports improvement in shortness of breath.  Still with significant cough and no significant expectoration.  Receiving intravenous vancomycin and cefepime and beta 2 agonist bronchodilator nebulization treatments.  Patient admits longstanding history of tobacco cigarette smoking.  Patient agreeable to receive Nicoderm transdermal patch.  Currently afebrile.    Review of Systems negative  Objective:     Vital Signs (Most Recent):  Temp: 97.8 °F (36.6 °C) (02/04/25 0710)  Pulse: 91 (02/04/25 0741)  Resp: 18 (02/04/25 0741)  BP: (!) 188/91 (02/04/25 0710)  SpO2: 95 % (02/04/25 0741) Vital Signs (24h Range):  Temp:  [97.8 °F (36.6 °C)-98 °F (36.7 °C)] 97.8 °F (36.6 °C)  Pulse:  [] 91  Resp:  [16-20] 18  SpO2:  [84 %-97 %] 95 %  BP: (174-202)/(85-97) 188/91     Weight: 52.2 kg (115 lb)  Body mass index is 18.01 kg/m².    Intake/Output Summary (Last 24 hours) at 2/4/2025 0904  Last data filed at 2/4/2025 0354  Gross per 24 hour   Intake --   Output 2750 ml   Net -2750 ml         Physical Exam  Vitals and nursing note reviewed.   Constitutional:       Appearance: He is well-developed.   HENT:      Head: Atraumatic.      Right Ear: External ear normal.      Left Ear: External ear normal.      Nose: Nose normal.      Mouth/Throat:      Mouth: Mucous membranes are moist.   Eyes:      Extraocular Movements: Extraocular movements intact.   Cardiovascular:      Rate and Rhythm: Regular rhythm. Tachycardia present.   Pulmonary:      Effort: Pulmonary effort is normal.   Abdominal:      Palpations: Abdomen is soft.   Musculoskeletal:         General: Normal range of motion.      Cervical back: Full passive range of motion without pain and normal range of motion.   Skin:     General: Skin is warm.   Neurological:      Mental Status: He is alert and oriented to  person, place, and time.   Psychiatric:         Behavior: Behavior normal.             Significant Labs:   Lab Results   Component Value Date    WBC 16.49 (H) 02/04/2025    HGB 11.1 (L) 02/04/2025    HCT 35.5 (L) 02/04/2025    MCV 80 (L) 02/04/2025     02/04/2025       CMP  Sodium   Date Value Ref Range Status   02/04/2025 136 136 - 145 mmol/L Final     Potassium   Date Value Ref Range Status   02/04/2025 3.7 3.5 - 5.1 mmol/L Final     Chloride   Date Value Ref Range Status   02/04/2025 99 95 - 110 mmol/L Final     CO2   Date Value Ref Range Status   02/04/2025 30 (H) 23 - 29 mmol/L Final     Glucose   Date Value Ref Range Status   02/04/2025 313 (H) 70 - 110 mg/dL Final     BUN   Date Value Ref Range Status   02/04/2025 13 6 - 20 mg/dL Final     Creatinine   Date Value Ref Range Status   02/04/2025 0.7 0.5 - 1.4 mg/dL Final     Calcium   Date Value Ref Range Status   02/04/2025 8.6 (L) 8.7 - 10.5 mg/dL Final     Total Protein   Date Value Ref Range Status   02/04/2025 6.4 6.0 - 8.4 g/dL Final     Albumin   Date Value Ref Range Status   02/04/2025 3.1 (L) 3.5 - 5.2 g/dL Final     Total Bilirubin   Date Value Ref Range Status   02/04/2025 0.3 0.1 - 1.0 mg/dL Final     Comment:     For infants and newborns, interpretation of results should be based  on gestational age, weight and in agreement with clinical  observations.    Premature Infant recommended reference ranges:  Up to 24 hours.............<8.0 mg/dL  Up to 48 hours............<12.0 mg/dL  3-5 days..................<15.0 mg/dL  6-29 days.................<15.0 mg/dL       Alkaline Phosphatase   Date Value Ref Range Status   02/04/2025 127 55 - 135 U/L Final     AST   Date Value Ref Range Status   02/04/2025 16 10 - 40 U/L Final     ALT   Date Value Ref Range Status   02/04/2025 16 10 - 44 U/L Final     Anion Gap   Date Value Ref Range Status   02/04/2025 7 (L) 8 - 16 mmol/L Final     eGFR   Date Value Ref Range Status   02/04/2025 >60.0 >60 mL/min/1.73  m^2 Final     Microbiology Results (last 7 days)       Procedure Component Value Units Date/Time    Culture, Respiratory with Gram Stain [[id number]] Collected: 02/03/25 0751    Order Status: Completed Specimen: Respiratory from Sputum, Expectorated Updated: 02/04/25 0821     Respiratory Culture Normal respiratory kushal     Gram Stain (Respiratory) Moderate WBC's     Gram Stain (Respiratory) <10 epithelial cells per low power field.     Gram Stain (Respiratory) Few Gram positive cocci     Gram Stain (Respiratory) Rare Gram positive rods     Gram Stain (Respiratory) Rare Gram negative rods    Blood culture x two cultures. Draw prior to antibiotics [[id number]] Collected: 02/02/25 1538    Order Status: Completed Specimen: Blood from Peripheral, Forearm, Right Updated: 02/03/25 1632     Blood Culture, Routine No Growth to date      No Growth to date    Narrative:      Aerobic and anaerobic    Blood culture x two cultures. Draw prior to antibiotics [[id number]] Collected: 02/02/25 1546    Order Status: Completed Specimen: Blood from Peripheral, Hand, Left Updated: 02/03/25 1632     Blood Culture, Routine No Growth to date      No Growth to date    Narrative:      Aerobic and anaerobic    Respiratory Infection Panel (PCR), Nasopharyngeal [[id number]] Collected: 02/02/25 1722    Order Status: Completed Specimen: Nasopharyngeal Swab Updated: 02/02/25 1938     Respiratory Infection Panel Source NP swab     Adenovirus Not Detected     Coronavirus 229E, Common Cold Virus Not Detected     Coronavirus HKU1, Common Cold Virus Not Detected     Coronavirus NL63, Common Cold Virus Not Detected     Coronavirus OC43, Common Cold Virus Not Detected     Comment: Coronavirus strains 229E, HKU1, NL63, and OC43 can cause the common   cold   and are not associated with the respiratory disease outbreak caused   by  the COVID-19 (SARS-CoV-2 novel Coronavirus) strain.           SARS-CoV2 (COVID-19) Qualitative PCR Not Detected     Human  Metapneumovirus Not Detected     Human Rhinovirus/Enterovirus Not Detected     Influenza A Not Detected     Influenza B Not Detected     Parainfluenza Virus 1 Not Detected     Parainfluenza Virus 2 Not Detected     Parainfluenza Virus 3 Not Detected     Parainfluenza Virus 4 Not Detected     Respiratory Syncytial Virus Not Detected     Bordetella Parapertussis ([id number]) Not Detected     Bordetella pertussis (ptxP) Not Detected     Chlamydia pneumoniae Not Detected     Mycoplasma pneumoniae Not Detected     Comment: Respiratory Infection Panel testing performed by Multiplex PCR.       Narrative:      Respiratory Infection Panel source->NP Swab        Significant Imaging:   CXR: No acute cardiopulmonary abnormality.     CT Chest without contrast:  Innumerable subcentimeter bilateral multifocal noncalcified nodular opacities, some of which are in a tree-in-bud pattern.  Findings are compatible with infectious/inflammatory process.  Short interval follow-up chest CT advised.  No focal consolidation, pleural effusion, or pneumothorax.  Nonspecific mediastinal lymphadenopathy.  Attention on follow-up.    CTA Chest:  1. No evidence of pulmonary embolism to the segmental arterial level. If there is continued clinical concern, consider further evaluation with lower extremity doppler.  2.  Innumerable scattered punctate nodular densities throughout both lungs, similar to the previous exam as above.  3.  Mildly enlarged mediastinal/hilar lymph nodes, likely reactive/inflammatory.  4.  Mild circumferential bronchial wall thickening with a central hilar lower lobe predominance suggesting mild bronchitis.

## 2025-02-04 NOTE — CARE UPDATE
02/04/25 0741   Patient Assessment/Suction   Level of Consciousness (AVPU) alert   Respiratory Effort Unlabored   Expansion/Accessory Muscles/Retractions no use of accessory muscles   All Lung Fields Breath Sounds coarse;diminished   Rhythm/Pattern, Respiratory unlabored;pattern regular;depth regular   Skin Integrity   $ Wound Care Tech Time 15 min   Area Observed Left;Right;Behind ear;Cheek   Skin Appearance without discoloration   PRE-TX-O2   Device (Oxygen Therapy) nasal cannula   $ Is the patient on Low Flow Oxygen? Yes   Flow (L/min) (Oxygen Therapy) 3   SpO2 95 %   Pulse Oximetry Type Intermittent   $ Pulse Oximetry - Multiple Charge Pulse Oximetry - Multiple   Pulse 91   Resp 18   Positioning   Body Position sitting up in bed   Head of Bed (HOB) Positioning HOB lowered   Aerosol Therapy   $ Aerosol Therapy Charges Aerosol Treatment   Daily Review of Necessity (SVN) completed   Respiratory Treatment Status (SVN) given   Treatment Route (SVN) mask   Patient Position HOB elevated   Post Treatment Assessment (SVN) breath sounds unchanged   Signs of Intolerance (SVN) none   Breath Sounds Post-Respiratory Treatment   Throughout All Fields Post-Treatment All Fields   Throughout All Fields Post-Treatment Anterior:;Posterior:;Lateral:   Post-treatment Heart Rate (beats/min) 89   Post-treatment Resp Rate (breaths/min) 18   Respiratory Interventions   Cough And Deep Breathing done with encouragement   Education   $ Education Bronchodilator;Smoking;45 min;Oxygen     Patient returned from smoking with a room air sat of 84 educated patient to not leave floor to smoke placed patient on nasal cannula 2 liters and treatment given sats improved to 94.

## 2025-02-05 LAB
ALBUMIN SERPL BCP-MCNC: 2.9 G/DL (ref 3.5–5.2)
ALP SERPL-CCNC: 108 U/L (ref 55–135)
ALT SERPL W/O P-5'-P-CCNC: 16 U/L (ref 10–44)
ANION GAP SERPL CALC-SCNC: 6 MMOL/L (ref 8–16)
AST SERPL-CCNC: 15 U/L (ref 10–40)
BACTERIA SPEC AEROBE CULT: NORMAL
BASOPHILS # BLD AUTO: 0.07 K/UL (ref 0–0.2)
BASOPHILS NFR BLD: 0.6 % (ref 0–1.9)
BILIRUB SERPL-MCNC: 0.3 MG/DL (ref 0.1–1)
BUN SERPL-MCNC: 17 MG/DL (ref 6–20)
CALCIUM SERPL-MCNC: 8.5 MG/DL (ref 8.7–10.5)
CHLORIDE SERPL-SCNC: 99 MMOL/L (ref 95–110)
CO2 SERPL-SCNC: 33 MMOL/L (ref 23–29)
CREAT SERPL-MCNC: 0.7 MG/DL (ref 0.5–1.4)
DIFFERENTIAL METHOD BLD: ABNORMAL
EOSINOPHIL # BLD AUTO: 0.2 K/UL (ref 0–0.5)
EOSINOPHIL NFR BLD: 1.4 % (ref 0–8)
ERYTHROCYTE [DISTWIDTH] IN BLOOD BY AUTOMATED COUNT: 15.5 % (ref 11.5–14.5)
EST. GFR  (NO RACE VARIABLE): >60 ML/MIN/1.73 M^2
GLUCOSE SERPL-MCNC: 153 MG/DL (ref 70–110)
GRAM STN SPEC: NORMAL
HCT VFR BLD AUTO: 37.5 % (ref 40–54)
HGB BLD-MCNC: 11.4 G/DL (ref 14–18)
IMM GRANULOCYTES # BLD AUTO: 0.16 K/UL (ref 0–0.04)
IMM GRANULOCYTES NFR BLD AUTO: 1.3 % (ref 0–0.5)
LYMPHOCYTES # BLD AUTO: 3.3 K/UL (ref 1–4.8)
LYMPHOCYTES NFR BLD: 26.4 % (ref 18–48)
MAGNESIUM SERPL-MCNC: 1.9 MG/DL (ref 1.6–2.6)
MCH RBC QN AUTO: 24.7 PG (ref 27–31)
MCHC RBC AUTO-ENTMCNC: 30.4 G/DL (ref 32–36)
MCV RBC AUTO: 81 FL (ref 82–98)
MONOCYTES # BLD AUTO: 1 K/UL (ref 0.3–1)
MONOCYTES NFR BLD: 7.5 % (ref 4–15)
NEUTROPHILS # BLD AUTO: 7.9 K/UL (ref 1.8–7.7)
NEUTROPHILS NFR BLD: 62.8 % (ref 38–73)
NRBC BLD-RTO: 0 /100 WBC
PLATELET # BLD AUTO: 347 K/UL (ref 150–450)
PLATELET BLD QL SMEAR: ABNORMAL
PMV BLD AUTO: 8.7 FL (ref 9.2–12.9)
POCT GLUCOSE: 137 MG/DL (ref 70–110)
POCT GLUCOSE: 183 MG/DL (ref 70–110)
POCT GLUCOSE: 213 MG/DL (ref 70–110)
POCT GLUCOSE: 249 MG/DL (ref 70–110)
POTASSIUM SERPL-SCNC: 3.7 MMOL/L (ref 3.5–5.1)
PROT SERPL-MCNC: 6.2 G/DL (ref 6–8.4)
RBC # BLD AUTO: 4.61 M/UL (ref 4.6–6.2)
SODIUM SERPL-SCNC: 138 MMOL/L (ref 136–145)
WBC # BLD AUTO: 12.59 K/UL (ref 3.9–12.7)

## 2025-02-05 PROCEDURE — 63600175 PHARM REV CODE 636 W HCPCS

## 2025-02-05 PROCEDURE — 94640 AIRWAY INHALATION TREATMENT: CPT

## 2025-02-05 PROCEDURE — 94761 N-INVAS EAR/PLS OXIMETRY MLT: CPT

## 2025-02-05 PROCEDURE — 21400001 HC TELEMETRY ROOM

## 2025-02-05 PROCEDURE — 63600175 PHARM REV CODE 636 W HCPCS: Performed by: INTERNAL MEDICINE

## 2025-02-05 PROCEDURE — 80053 COMPREHEN METABOLIC PANEL: CPT | Performed by: INTERNAL MEDICINE

## 2025-02-05 PROCEDURE — 25000003 PHARM REV CODE 250: Performed by: INTERNAL MEDICINE

## 2025-02-05 PROCEDURE — 85025 COMPLETE CBC W/AUTO DIFF WBC: CPT | Performed by: INTERNAL MEDICINE

## 2025-02-05 PROCEDURE — 99900031 HC PATIENT EDUCATION (STAT)

## 2025-02-05 PROCEDURE — 36415 COLL VENOUS BLD VENIPUNCTURE: CPT | Performed by: INTERNAL MEDICINE

## 2025-02-05 PROCEDURE — 25000242 PHARM REV CODE 250 ALT 637 W/ HCPCS: Performed by: INTERNAL MEDICINE

## 2025-02-05 PROCEDURE — 83735 ASSAY OF MAGNESIUM: CPT | Performed by: INTERNAL MEDICINE

## 2025-02-05 PROCEDURE — S4991 NICOTINE PATCH NONLEGEND: HCPCS | Performed by: INTERNAL MEDICINE

## 2025-02-05 PROCEDURE — 27000221 HC OXYGEN, UP TO 24 HOURS

## 2025-02-05 RX ORDER — LEVOFLOXACIN 750 MG/1
750 TABLET ORAL DAILY
Status: DISCONTINUED | OUTPATIENT
Start: 2025-02-05 | End: 2025-02-06 | Stop reason: HOSPADM

## 2025-02-05 RX ORDER — PREDNISONE 20 MG/1
40 TABLET ORAL DAILY
Status: DISCONTINUED | OUTPATIENT
Start: 2025-02-05 | End: 2025-02-06 | Stop reason: HOSPADM

## 2025-02-05 RX ORDER — LISINOPRIL 20 MG/1
40 TABLET ORAL DAILY
Status: DISCONTINUED | OUTPATIENT
Start: 2025-02-06 | End: 2025-02-06 | Stop reason: HOSPADM

## 2025-02-05 RX ADMIN — INSULIN ASPART 6 UNITS: 100 INJECTION, SOLUTION INTRAVENOUS; SUBCUTANEOUS at 11:02

## 2025-02-05 RX ADMIN — GUAIFENESIN AND DEXTROMETHORPHAN HYDROBROMIDE 1 TABLET: 600; 30 TABLET, EXTENDED RELEASE ORAL at 09:02

## 2025-02-05 RX ADMIN — BUDESONIDE INHALATION 0.5 MG: 0.5 SUSPENSION RESPIRATORY (INHALATION) at 06:02

## 2025-02-05 RX ADMIN — INSULIN GLARGINE 22 UNITS: 100 INJECTION, SOLUTION SUBCUTANEOUS at 09:02

## 2025-02-05 RX ADMIN — ATORVASTATIN CALCIUM 10 MG: 10 TABLET, FILM COATED ORAL at 09:02

## 2025-02-05 RX ADMIN — ENOXAPARIN SODIUM 40 MG: 40 INJECTION SUBCUTANEOUS at 04:02

## 2025-02-05 RX ADMIN — IPRATROPIUM BROMIDE 0.5 MG: 0.5 SOLUTION RESPIRATORY (INHALATION) at 12:02

## 2025-02-05 RX ADMIN — SODIUM CHLORIDE: 9 INJECTION, SOLUTION INTRAVENOUS at 02:02

## 2025-02-05 RX ADMIN — CLOPIDOGREL BISULFATE 75 MG: 75 TABLET, FILM COATED ORAL at 09:02

## 2025-02-05 RX ADMIN — LABETALOL HYDROCHLORIDE 10 MG: 5 INJECTION, SOLUTION INTRAVENOUS at 11:02

## 2025-02-05 RX ADMIN — BUPRENORPHINE AND NALOXONE 1 FILM: 8; 2 FILM BUCCAL; SUBLINGUAL at 09:02

## 2025-02-05 RX ADMIN — FAMOTIDINE 20 MG: 20 TABLET ORAL at 09:02

## 2025-02-05 RX ADMIN — LEVOFLOXACIN 750 MG: 750 TABLET, FILM COATED ORAL at 02:02

## 2025-02-05 RX ADMIN — Medication 800 MG: at 09:02

## 2025-02-05 RX ADMIN — LEVALBUTEROL HYDROCHLORIDE 1.25 MG: 1.25 SOLUTION RESPIRATORY (INHALATION) at 06:02

## 2025-02-05 RX ADMIN — POTASSIUM BICARBONATE 50 MEQ: 978 TABLET, EFFERVESCENT ORAL at 09:02

## 2025-02-05 RX ADMIN — NICOTINE 1 PATCH: 14 PATCH, EXTENDED RELEASE TRANSDERMAL at 09:02

## 2025-02-05 RX ADMIN — PREDNISONE 40 MG: 20 TABLET ORAL at 02:02

## 2025-02-05 RX ADMIN — BUDESONIDE INHALATION 0.5 MG: 0.5 SUSPENSION RESPIRATORY (INHALATION) at 07:02

## 2025-02-05 RX ADMIN — LEVALBUTEROL HYDROCHLORIDE 1.25 MG: 1.25 SOLUTION RESPIRATORY (INHALATION) at 12:02

## 2025-02-05 RX ADMIN — LISINOPRIL 10 MG: 10 TABLET ORAL at 09:02

## 2025-02-05 RX ADMIN — CEFEPIME 2 G: 2 INJECTION, POWDER, FOR SOLUTION INTRAVENOUS at 12:02

## 2025-02-05 RX ADMIN — INSULIN ASPART 4 UNITS: 100 INJECTION, SOLUTION INTRAVENOUS; SUBCUTANEOUS at 09:02

## 2025-02-05 RX ADMIN — IPRATROPIUM BROMIDE 0.5 MG: 0.5 SOLUTION RESPIRATORY (INHALATION) at 03:02

## 2025-02-05 RX ADMIN — LEVALBUTEROL HYDROCHLORIDE 1.25 MG: 1.25 SOLUTION RESPIRATORY (INHALATION) at 03:02

## 2025-02-05 RX ADMIN — IPRATROPIUM BROMIDE 0.5 MG: 0.5 SOLUTION RESPIRATORY (INHALATION) at 06:02

## 2025-02-05 RX ADMIN — CEFEPIME 2 G: 2 INJECTION, POWDER, FOR SOLUTION INTRAVENOUS at 03:02

## 2025-02-05 NOTE — SUBJECTIVE & OBJECTIVE
Interval History: Patient is on RA exam. His oxygen saturation was 95% on RA. Patient report having difficulty in breathing over an year. No fever or worsening cough.     Review of Systems  Objective:     Vital Signs (Most Recent):  Temp: 98 °F (36.7 °C) (02/05/25 1111)  Pulse: 84 (02/05/25 1111)  Resp: 18 (02/05/25 0655)  BP: (!) 212/105 (02/05/25 1111)  SpO2: (!) 90 % (02/05/25 1111) Vital Signs (24h Range):  Temp:  [97.7 °F (36.5 °C)-98.6 °F (37 °C)] 98 °F (36.7 °C)  Pulse:  [] 84  Resp:  [17-20] 18  SpO2:  [90 %-96 %] 90 %  BP: (136-212)/() 212/105     Weight: 52.2 kg (115 lb)  Body mass index is 18.01 kg/m².    Intake/Output Summary (Last 24 hours) at 2/5/2025 1350  Last data filed at 2/5/2025 1345  Gross per 24 hour   Intake 240 ml   Output 1400 ml   Net -1160 ml      Physical Exam  Vitals and nursing note reviewed.   Constitutional:       Appearance: He is well-developed.   HENT:      Head: Atraumatic.      Right Ear: External ear normal.      Left Ear: External ear normal.      Nose: Nose normal.      Mouth/Throat:      Mouth: Mucous membranes are moist.   Eyes:      Extraocular Movements: Extraocular movements intact.   Cardiovascular:      Rate and Rhythm: Regular rhythm. Tachycardia present.   Pulmonary:      Effort: Pulmonary effort is normal.   Abdominal:      Palpations: Abdomen is soft.   Musculoskeletal:         General: Normal range of motion.      Cervical back: Full passive range of motion without pain and normal range of motion.   Skin:     General: Skin is warm.   Neurological:      Mental Status: He is alert and oriented to person, place, and time.   Psychiatric:         Behavior: Behavior normal.     Significant Labs: All pertinent labs within the past 24 hours have been reviewed.  CBC:   Recent Labs   Lab 02/04/25 0618 02/05/25  0539   WBC 16.49* 12.59   HGB 11.1* 11.4*   HCT 35.5* 37.5*    347     CMP:   Recent Labs   Lab 02/04/25 0618 02/05/25  0539    138   K 3.7  3.7   CL 99 99   CO2 30* 33*   * 153*   BUN 13 17   CREATININE 0.7 0.7   CALCIUM 8.6* 8.5*   PROT 6.4 6.2   ALBUMIN 3.1* 2.9*   BILITOT 0.3 0.3   ALKPHOS 127 108   AST 16 15   ALT 16 16   ANIONGAP 7* 6*       Significant Imaging: I have reviewed all pertinent imaging results/findings within the past 24 hours.

## 2025-02-05 NOTE — ASSESSMENT & PLAN NOTE
Patient's FSGs are uncontrolled due to hyperglycemia on current medication regimen.  Last A1c reviewed-   Lab Results   Component Value Date    HGBA1C 8.5 (H) 02/03/2025     Most recent fingerstick glucose reviewed-   Recent Labs   Lab 02/04/25  1640 02/05/25  0629 02/05/25  1106   POCTGLUCOSE 108 137* 213*       Current correctional scale  increased to high dose sliding scale based on the blood glucose this morning  Maintain anti-hyperglycemic dose as follows-   Antihyperglycemics (From admission, onward)    Start     Stop Route Frequency Ordered    02/04/25 2100  insulin glargine U-100 (Lantus) pen 22 Units         -- SubQ Nightly 02/04/25 0908    02/03/25 1032  insulin aspart U-100 pen 0-15 Units         -- SubQ Before meals & nightly PRN 02/03/25 0932        Hold Oral hypoglycemics while patient is in the hospital.

## 2025-02-05 NOTE — NURSING
Patient refusing to wear MD Krystyna at bedside and aware. 02 sats 95% on room air, MD ordered ok to leave oxygen off of patient at this time and monitor sats. Patient refusing bed alarm and ambulating down hallway by self. Educated patient importance of walking with assistance and staying on floor, patient aware continues to refuse. Educated patient if he leaves off of unit staff will not have a way to properly monitor him if an emergency happens, patient aware.

## 2025-02-05 NOTE — CARE UPDATE
02/04/25 1943   Patient Assessment/Suction   Level of Consciousness (AVPU) alert   Respiratory Effort Normal;Unlabored   Expansion/Accessory Muscles/Retractions no use of accessory muscles   All Lung Fields Breath Sounds diminished   Rhythm/Pattern, Respiratory unlabored;pattern regular   Cough Frequency infrequent   Cough Type good;nonproductive   PRE-TX-O2   Device (Oxygen Therapy) nasal cannula   Flow (L/min) (Oxygen Therapy) 3   Oxygen Concentration (%) 32   SpO2 (!) 93 %   Pulse Oximetry Type Intermittent   $ Pulse Oximetry - Multiple Charge Pulse Oximetry - Multiple   Pulse 95   Resp 18   Aerosol Therapy   $ Aerosol Therapy Charges Aerosol Treatment   Respiratory Treatment Status (SVN) given   Treatment Route (SVN) mask;oxygen   Patient Position sitting on edge of bed   Post Treatment Assessment (SVN) breath sounds improved   Signs of Intolerance (SVN) none   Breath Sounds Post-Respiratory Treatment   Throughout All Fields Post-Treatment All Fields   Throughout All Fields Post-Treatment aeration increased   Post-treatment Heart Rate (beats/min) 94   Post-treatment Resp Rate (breaths/min) 20   Ready to Wean/Extubation Screen   FIO2<=50 (chart decimal) 0.32

## 2025-02-05 NOTE — ASSESSMENT & PLAN NOTE
Procalcitonin is negative   CTA chest was personally reviewed, no infiltration whatsoever   DC cefepime and vancomycin   Changed to PO Levaquin for 4 more days

## 2025-02-05 NOTE — CARE UPDATE
02/05/25 0655   Patient Assessment/Suction   Level of Consciousness (AVPU) alert   Respiratory Effort Normal;Unlabored   Expansion/Accessory Muscles/Retractions no use of accessory muscles;no retractions;expansion symmetric   All Lung Fields Breath Sounds Anterior:   HAYDEE Breath Sounds coarse   RUL Breath Sounds clear   Rhythm/Pattern, Respiratory unlabored   Cough Frequency no cough   PRE-TX-O2   Device (Oxygen Therapy) nasal cannula   $ Is the patient on Low Flow Oxygen? Yes   Flow (L/min) (Oxygen Therapy) 3   SpO2 95 %   Pulse Oximetry Type Intermittent   $ Pulse Oximetry - Multiple Charge Pulse Oximetry - Multiple   Pulse 95   Resp 18   Aerosol Therapy   $ Aerosol Therapy Charges Aerosol Treatment   Daily Review of Necessity (SVN) completed   Respiratory Treatment Status (SVN) given   Treatment Route (SVN) mask;oxygen   Patient Position HOB elevated   Post Treatment Assessment (SVN) breath sounds unchanged   Signs of Intolerance (SVN) none   Breath Sounds Post-Respiratory Treatment   Throughout All Fields Post-Treatment All Fields   Throughout All Fields Post-Treatment no change   Post-treatment Heart Rate (beats/min) 93   Post-treatment Resp Rate (breaths/min) 20   Education   $ Education Bronchodilator;30 min

## 2025-02-05 NOTE — PROGRESS NOTES
[facility] Medicine  Progress Note    Patient Name: Sergio Porter  MRN: [id number]  Patient Class: IP- Inpatient   Admission Date: 2/2/2025  Length of Stay: 3 days  Attending Physician: Alayna Plunkett MD  Primary Care Provider: Corie, Primary Doctor        Subjective     Principal Problem:Acute hypercapnic respiratory failure        HPI:  49 year old pt getting admitted with acute resp failure/acute pneumonia  Pt started having URTI Like symptoms few days ago  He continued to smoke and stopped smoking 2 days ago  Last few days pt has been having cough and SOB  He cannot even walk towards mailbox and felt very SOB  He came to hospital today and got admitted     Overview/Hospital Course:  Mr. Porter is a 49-year-old male admitted for respiratory distress likely secondary to pneumonia.  He was started on IV vancomycin and cefepime.  Respiratory viral panel negative.  Blood cultures and sputum cultures were closely followed. He also had episodes of hypertensive urgency that required IV labetalol.  Patient continued to have tachycardia, tachypnea and hypoxia with respiratory distress and CTA PE protocol was ordered that was negative for PE.  Also had episodes of hyperglycemia requiring adjustment in insulin regimen.  Patient was initiated on lisinopril 10 mg p.o. q.day for elevated blood pressure.  Patient was extensively counseled regarding dangers of tobacco cigarette smoking and smoking cessation counseling performed. Patient received three days of cefepime and Vancomycin changed to PO Levaquin. PO prednisone was started.       Interval History: Patient is on RA exam. His oxygen saturation was 95% on RA. Patient report having difficulty in breathing over an year. No fever or worsening cough.     Review of Systems  Objective:     Vital Signs (Most Recent):  Temp: 98 °F (36.7 °C) (02/05/25 1111)  Pulse: 84 (02/05/25 1111)  Resp: 18 (02/05/25 0655)  BP: (!) 212/105 (02/05/25 1111)  SpO2: (!) 90  % (02/05/25 1111) Vital Signs (24h Range):  Temp:  [97.7 °F (36.5 °C)-98.6 °F (37 °C)] 98 °F (36.7 °C)  Pulse:  [] 84  Resp:  [17-20] 18  SpO2:  [90 %-96 %] 90 %  BP: (136-212)/() 212/105     Weight: 52.2 kg (115 lb)  Body mass index is 18.01 kg/m².    Intake/Output Summary (Last 24 hours) at 2/5/2025 1350  Last data filed at 2/5/2025 1345  Gross per 24 hour   Intake 240 ml   Output 1400 ml   Net -1160 ml      Physical Exam  Vitals and nursing note reviewed.   Constitutional:       Appearance: He is well-developed.   HENT:      Head: Atraumatic.      Right Ear: External ear normal.      Left Ear: External ear normal.      Nose: Nose normal.      Mouth/Throat:      Mouth: Mucous membranes are moist.   Eyes:      Extraocular Movements: Extraocular movements intact.   Cardiovascular:      Rate and Rhythm: Regular rhythm. Tachycardia present.   Pulmonary:      Effort: Pulmonary effort is normal.   Abdominal:      Palpations: Abdomen is soft.   Musculoskeletal:         General: Normal range of motion.      Cervical back: Full passive range of motion without pain and normal range of motion.   Skin:     General: Skin is warm.   Neurological:      Mental Status: He is alert and oriented to person, place, and time.   Psychiatric:         Behavior: Behavior normal.     Significant Labs: All pertinent labs within the past 24 hours have been reviewed.  CBC:   Recent Labs   Lab 02/04/25  0618 02/05/25  0539   WBC 16.49* 12.59   HGB 11.1* 11.4*   HCT 35.5* 37.5*    347     CMP:   Recent Labs   Lab 02/04/25  0618 02/05/25  0539    138   K 3.7 3.7   CL 99 99   CO2 30* 33*   * 153*   BUN 13 17   CREATININE 0.7 0.7   CALCIUM 8.6* 8.5*   PROT 6.4 6.2   ALBUMIN 3.1* 2.9*   BILITOT 0.3 0.3   ALKPHOS 127 108   AST 16 15   ALT 16 16   ANIONGAP 7* 6*       Significant Imaging: I have reviewed all pertinent imaging results/findings within the past 24 hours.    Assessment and Plan     * Acute hypercapnic  respiratory failure  CTA negative for PE   Patient is a smoker   Likely undiagnosed COPD   - Cont Duonebs   - Added PO Prednisone   - Antibiotics changed to Levaquin     Opiate use  - on Suboxone at home  - continue while here  - patient mentions he takes 0.5-1 sublingual films 3 times a day, we double-checked the dose with his pharmacy, ordered as 1 film BID ( cannot split the film as per pharmacy)      Hypertensive urgency  BP uncontrolled   Increase Lisinopril to 40 mg   PRN Labetalol       Sinus tachycardia  Resolved.      PVD (peripheral vascular disease)  Maintain statins and antiplatelets      Acute pneumonia  Procalcitonin is negative   CTA chest was personally reviewed, no infiltration whatsoever   DC cefepime and vancomycin   Changed to PO Levaquin for 4 more days     Medical non-compliance  Per chart review       Tobacco dependence  Smoking cessation counseling performed. Dangers of cigarette smoking were reviewed with patient in detail and patient was encouraged to quit. Nicotine replacement options were discussed for > 3 minutes.  Patient agreeable to receive Nicoderm.      Type 2 diabetes mellitus without complication  Patient's FSGs are uncontrolled due to hyperglycemia on current medication regimen.  Last A1c reviewed-   Lab Results   Component Value Date    HGBA1C 8.5 (H) 02/03/2025     Most recent fingerstick glucose reviewed-   Recent Labs   Lab 02/04/25  1640 02/05/25  0629 02/05/25  1106   POCTGLUCOSE 108 137* 213*       Current correctional scale  increased to high dose sliding scale based on the blood glucose this morning  Maintain anti-hyperglycemic dose as follows-   Antihyperglycemics (From admission, onward)      Start     Stop Route Frequency Ordered    02/04/25 2100  insulin glargine U-100 (Lantus) pen 22 Units         -- SubQ Nightly 02/04/25 0908    02/03/25 1032  insulin aspart U-100 pen 0-15 Units         -- SubQ Before meals & nightly PRN 02/03/25 0932          Hold Oral  hypoglycemics while patient is in the hospital.        VTE Risk Mitigation (From admission, onward)           Ordered     enoxaparin injection 40 mg  Daily         02/02/25 1839     IP VTE HIGH RISK PATIENT  Once         02/02/25 1839     Place sequential compression device  Until discontinued         02/02/25 1839                    Discharge Planning   MADDIE: 2/6/2025     Code Status: Full Code   Medical Readiness for Discharge Date:   Discharge Plan A: Home with family                        Alayna Plunkett MD  Department of Hospital Medicine   [facility]

## 2025-02-05 NOTE — ASSESSMENT & PLAN NOTE
CTA negative for PE   Patient is a smoker   Likely undiagnosed COPD   - Cont Duonebs   - Added PO Prednisone   - Antibiotics changed to Levaquin

## 2025-02-06 ENCOUNTER — CLINICAL SUPPORT (OUTPATIENT)
Dept: SMOKING CESSATION | Facility: CLINIC | Age: 50
End: 2025-02-06
Payer: MEDICAID

## 2025-02-06 VITALS
HEIGHT: 67 IN | WEIGHT: 115 LBS | RESPIRATION RATE: 16 BRPM | HEART RATE: 98 BPM | TEMPERATURE: 98 F | SYSTOLIC BLOOD PRESSURE: 165 MMHG | BODY MASS INDEX: 18.05 KG/M2 | OXYGEN SATURATION: 94 % | DIASTOLIC BLOOD PRESSURE: 90 MMHG

## 2025-02-06 DIAGNOSIS — F17.200 NICOTINE DEPENDENCE: Primary | ICD-10-CM

## 2025-02-06 LAB
ALBUMIN SERPL BCP-MCNC: 3.2 G/DL (ref 3.5–5.2)
ALP SERPL-CCNC: 108 U/L (ref 55–135)
ALT SERPL W/O P-5'-P-CCNC: 14 U/L (ref 10–44)
ANION GAP SERPL CALC-SCNC: 7 MMOL/L (ref 8–16)
AST SERPL-CCNC: 10 U/L (ref 10–40)
BASOPHILS NFR BLD: 0 % (ref 0–1.9)
BILIRUB SERPL-MCNC: 0.3 MG/DL (ref 0.1–1)
BUN SERPL-MCNC: 21 MG/DL (ref 6–20)
CALCIUM SERPL-MCNC: 8.7 MG/DL (ref 8.7–10.5)
CHLORIDE SERPL-SCNC: 91 MMOL/L (ref 95–110)
CO2 SERPL-SCNC: 34 MMOL/L (ref 23–29)
CREAT SERPL-MCNC: 0.8 MG/DL (ref 0.5–1.4)
DIFFERENTIAL METHOD BLD: ABNORMAL
EOSINOPHIL NFR BLD: 0 % (ref 0–8)
ERYTHROCYTE [DISTWIDTH] IN BLOOD BY AUTOMATED COUNT: 15.5 % (ref 11.5–14.5)
EST. GFR  (NO RACE VARIABLE): >60 ML/MIN/1.73 M^2
GLUCOSE SERPL-MCNC: 374 MG/DL (ref 70–110)
HCT VFR BLD AUTO: 39.1 % (ref 40–54)
HGB BLD-MCNC: 12.4 G/DL (ref 14–18)
IMM GRANULOCYTES # BLD AUTO: ABNORMAL K/UL (ref 0–0.04)
IMM GRANULOCYTES NFR BLD AUTO: ABNORMAL % (ref 0–0.5)
LYMPHOCYTES NFR BLD: 26 % (ref 18–48)
MAGNESIUM SERPL-MCNC: 2.1 MG/DL (ref 1.6–2.6)
MCH RBC QN AUTO: 25.4 PG (ref 27–31)
MCHC RBC AUTO-ENTMCNC: 31.7 G/DL (ref 32–36)
MCV RBC AUTO: 80 FL (ref 82–98)
MONOCYTES NFR BLD: 2 % (ref 4–15)
NEUTROPHILS NFR BLD: 68 % (ref 38–73)
NEUTS BAND NFR BLD MANUAL: 4 %
NRBC BLD-RTO: 0 /100 WBC
PLATELET # BLD AUTO: 409 K/UL (ref 150–450)
PLATELET BLD QL SMEAR: ABNORMAL
PMV BLD AUTO: 8.5 FL (ref 9.2–12.9)
POCT GLUCOSE: 383 MG/DL (ref 70–110)
POTASSIUM SERPL-SCNC: 4.3 MMOL/L (ref 3.5–5.1)
PROT SERPL-MCNC: 6.7 G/DL (ref 6–8.4)
RBC # BLD AUTO: 4.89 M/UL (ref 4.6–6.2)
SODIUM SERPL-SCNC: 132 MMOL/L (ref 136–145)
WBC # BLD AUTO: 12.31 K/UL (ref 3.9–12.7)

## 2025-02-06 PROCEDURE — 25000003 PHARM REV CODE 250: Performed by: INTERNAL MEDICINE

## 2025-02-06 PROCEDURE — 94640 AIRWAY INHALATION TREATMENT: CPT

## 2025-02-06 PROCEDURE — 99406 BEHAV CHNG SMOKING 3-10 MIN: CPT | Mod: S$GLB,,, | Performed by: CLINIC/CENTER

## 2025-02-06 PROCEDURE — 94761 N-INVAS EAR/PLS OXIMETRY MLT: CPT

## 2025-02-06 PROCEDURE — 85027 COMPLETE CBC AUTOMATED: CPT | Performed by: INTERNAL MEDICINE

## 2025-02-06 PROCEDURE — 80053 COMPREHEN METABOLIC PANEL: CPT | Performed by: INTERNAL MEDICINE

## 2025-02-06 PROCEDURE — 85007 BL SMEAR W/DIFF WBC COUNT: CPT | Performed by: INTERNAL MEDICINE

## 2025-02-06 PROCEDURE — 36415 COLL VENOUS BLD VENIPUNCTURE: CPT | Performed by: INTERNAL MEDICINE

## 2025-02-06 PROCEDURE — 83735 ASSAY OF MAGNESIUM: CPT | Performed by: INTERNAL MEDICINE

## 2025-02-06 PROCEDURE — 27000221 HC OXYGEN, UP TO 24 HOURS

## 2025-02-06 PROCEDURE — 63600175 PHARM REV CODE 636 W HCPCS: Performed by: INTERNAL MEDICINE

## 2025-02-06 PROCEDURE — S4991 NICOTINE PATCH NONLEGEND: HCPCS | Performed by: INTERNAL MEDICINE

## 2025-02-06 PROCEDURE — 25000242 PHARM REV CODE 250 ALT 637 W/ HCPCS: Performed by: INTERNAL MEDICINE

## 2025-02-06 PROCEDURE — 63600175 PHARM REV CODE 636 W HCPCS

## 2025-02-06 PROCEDURE — 99900035 HC TECH TIME PER 15 MIN (STAT)

## 2025-02-06 RX ORDER — INSULIN GLARGINE 100 [IU]/ML
22 INJECTION, SOLUTION SUBCUTANEOUS NIGHTLY
Qty: 6.6 ML | Refills: 0 | Status: SHIPPED | OUTPATIENT
Start: 2025-02-06 | End: 2025-03-08

## 2025-02-06 RX ORDER — LEVOFLOXACIN 750 MG/1
750 TABLET ORAL DAILY
Qty: 3 TABLET | Refills: 0 | Status: SHIPPED | OUTPATIENT
Start: 2025-02-07 | End: 2025-02-10

## 2025-02-06 RX ORDER — LISINOPRIL 40 MG/1
40 TABLET ORAL DAILY
Qty: 30 TABLET | Refills: 0 | Status: SHIPPED | OUTPATIENT
Start: 2025-02-06 | End: 2025-03-08

## 2025-02-06 RX ORDER — PREDNISONE 20 MG/1
40 TABLET ORAL DAILY
Qty: 10 TABLET | Refills: 0 | Status: SHIPPED | OUTPATIENT
Start: 2025-02-06 | End: 2025-02-11

## 2025-02-06 RX ADMIN — LEVOFLOXACIN 750 MG: 750 TABLET, FILM COATED ORAL at 05:02

## 2025-02-06 RX ADMIN — GUAIFENESIN AND DEXTROMETHORPHAN HYDROBROMIDE 1 TABLET: 600; 30 TABLET, EXTENDED RELEASE ORAL at 09:02

## 2025-02-06 RX ADMIN — IPRATROPIUM BROMIDE 0.5 MG: 0.5 SOLUTION RESPIRATORY (INHALATION) at 06:02

## 2025-02-06 RX ADMIN — LISINOPRIL 40 MG: 20 TABLET ORAL at 09:02

## 2025-02-06 RX ADMIN — BUPRENORPHINE AND NALOXONE 1 FILM: 8; 2 FILM BUCCAL; SUBLINGUAL at 09:02

## 2025-02-06 RX ADMIN — LEVALBUTEROL HYDROCHLORIDE 1.25 MG: 1.25 SOLUTION RESPIRATORY (INHALATION) at 12:02

## 2025-02-06 RX ADMIN — FAMOTIDINE 20 MG: 20 TABLET ORAL at 09:02

## 2025-02-06 RX ADMIN — LEVALBUTEROL HYDROCHLORIDE 1.25 MG: 1.25 SOLUTION RESPIRATORY (INHALATION) at 06:02

## 2025-02-06 RX ADMIN — IPRATROPIUM BROMIDE 0.5 MG: 0.5 SOLUTION RESPIRATORY (INHALATION) at 12:02

## 2025-02-06 RX ADMIN — NICOTINE 1 PATCH: 14 PATCH, EXTENDED RELEASE TRANSDERMAL at 09:02

## 2025-02-06 RX ADMIN — PREDNISONE 40 MG: 20 TABLET ORAL at 09:02

## 2025-02-06 RX ADMIN — CLOPIDOGREL BISULFATE 75 MG: 75 TABLET, FILM COATED ORAL at 09:02

## 2025-02-06 RX ADMIN — BUDESONIDE INHALATION 0.5 MG: 0.5 SUSPENSION RESPIRATORY (INHALATION) at 06:02

## 2025-02-06 NOTE — CARE UPDATE
02/06/25 0649   PRE-TX-O2   SpO2 (!) 91 %   Pulse 99   Resp 18   Aerosol Therapy   $ Aerosol Therapy Charges Aerosol Treatment  (pulmicort)   Respiratory Treatment Status (SVN) given   Treatment Route (SVN) mouthpiece utilized   Signs of Intolerance (SVN) none   Breath Sounds Post-Respiratory Treatment   Throughout All Fields Post-Treatment All Fields   Throughout All Fields Post-Treatment aeration increased   Post-treatment Heart Rate (beats/min) 92   Post-treatment Resp Rate (breaths/min) 18

## 2025-02-06 NOTE — CARE UPDATE
02/05/25 1944   Patient Assessment/Suction   Level of Consciousness (AVPU) alert   Respiratory Effort Unlabored   Expansion/Accessory Muscles/Retractions expansion symmetric;no retractions   All Lung Fields Breath Sounds Anterior:;Lateral:;diminished   Rhythm/Pattern, Respiratory no shortness of breath reported   PRE-TX-O2   Device (Oxygen Therapy) nasal cannula   Flow (L/min) (Oxygen Therapy) 2   SpO2 (!) 93 %   Pulse Oximetry Type Intermittent   $ Pulse Oximetry - Multiple Charge Pulse Oximetry - Multiple   Pulse 95   Resp 18   Aerosol Therapy   $ Aerosol Therapy Charges Aerosol Treatment   Daily Review of Necessity (SVN) completed   Respiratory Treatment Status (SVN) given   Treatment Route (SVN) mask;oxygen   Patient Position HOB elevated   Post Treatment Assessment (SVN) increased aeration   Signs of Intolerance (SVN) none   Breath Sounds Post-Respiratory Treatment   Throughout All Fields Post-Treatment All Fields   Throughout All Fields Post-Treatment aeration increased   Post-treatment Heart Rate (beats/min) 95   Post-treatment Resp Rate (breaths/min) 20   Education   $ Education Bronchodilator;Oxygen;15 min

## 2025-02-06 NOTE — ASSESSMENT & PLAN NOTE
Procalcitonin is negative   CTA chest was personally reviewed, no infiltration whatsoever   Received 3 days of cefepime and vancomycin   Changed to PO Levaquin for 4 more days

## 2025-02-06 NOTE — DISCHARGE SUMMARY
[facility] Medicine  Discharge Summary      Patient Name: Sergio Porter  MRN: [id number]  [facility]: [id number]  Patient Class: IP- Inpatient  Admission Date: 2/2/2025  Hospital Length of Stay: 4 days  Discharge Date and Time:  02/06/2025   Attending Physician: Alayna Plunkett MD   Discharging Provider: Alayna Plunkett MD  Primary Care Provider: Aletha Henson FNP-CATHY    Primary Care Team: Networked reference to record PCT     HPI:   49 year old pt getting admitted with acute resp failure/acute pneumonia  Pt started having URTI Like symptoms few days ago  He continued to smoke and stopped smoking 2 days ago  Last few days pt has been having cough and SOB  He cannot even walk towards mailbox and felt very SOB  He came to hospital today and got admitted     * No surgery found *      Hospital Course:   Mr. Porter is a 49-year-old male admitted for respiratory distress likely secondary to pneumonia.  He was started on IV vancomycin and cefepime.  Respiratory viral panel negative.  Blood cultures and sputum cultures were closely followed. He also had episodes of hypertensive urgency that required IV labetalol.  Patient continued to have tachycardia, tachypnea and hypoxia with respiratory distress and CTA PE protocol was ordered that was negative for PE.  Also had episodes of hyperglycemia requiring adjustment in insulin regimen.  Patient was initiated on lisinopril 10 mg p.o. q.day for elevated blood pressure.  Patient was extensively counseled regarding dangers of tobacco cigarette smoking and smoking cessation counseling performed. Patient received three days of cefepime and Vancomycin changed to PO Levaquin. PO prednisone was started.   Home oxygen eval qualify him for 4 L oxygen on ambulation. Patient was recommended to quit smoking, and nicotine patches were given.      Goals of Care Treatment Preferences:  Code Status: Full Code    Physical Exam  Vitals and nursing note reviewed.    Constitutional:       Appearance: He is well-developed.   HENT:      Head: Atraumatic.      Right Ear: External ear normal.      Left Ear: External ear normal.      Nose: Nose normal.      Mouth/Throat:      Mouth: Mucous membranes are moist.   Eyes:      Extraocular Movements: Extraocular movements intact.   Cardiovascular:      Rate and Rhythm: Regular rhythm.   Pulmonary:      Effort: Pulmonary effort is normal. Diminished breath sounds   Abdominal:      Palpations: Abdomen is soft.   Musculoskeletal:         General: Normal range of motion.      Cervical back: Full passive range of motion without pain and normal range of motion.   Skin:     General: Skin is warm.   Neurological:      Mental Status: He is alert and oriented to person, place, and time.   Psychiatric:         Behavior: Behavior normal.    SDOH Screening:  The patient was screened for food insecurity, housing instability, transportation needs, utility difficulties, and interpersonal safety. The social determinant(s) of health identified as a concern this admission are:  Food insecurity  Utility difficulties    Will discuss with case management and/or community health workers.    Social Drivers of Health with Concerns     Food Insecurity: Food Insecurity Present (2/3/2025)   Utilities: At Risk (2/3/2025)        Consults:     * Acute hypercapnic respiratory failure  CTA negative for PE   Patient is a smoker   Likely undiagnosed COPD   - resume home breathing Rx  - Added PO Prednisone for 5 days  - Antibiotics changed to Levaquin, complete total 7 days     Opiate use  - on Suboxone at home, resume       Hypertensive urgency  BP uncontrolled   Increase Lisinopril to 40 mg         Sinus tachycardia  Resolved.      PVD (peripheral vascular disease)  Maintain statins and antiplatelets      Acute pneumonia  Procalcitonin is negative   CTA chest was personally reviewed, no infiltration whatsoever   Received 3 days of cefepime and vancomycin   Changed to PO  Levaquin for 4 more days     Medical non-compliance  Per chart review       Tobacco dependence  Smoking cessation counseling performed. Dangers of cigarette smoking were reviewed with patient in detail and patient was encouraged to quit. Nicotine replacement options were discussed for > 3 minutes.  Patient agreeable to receive Nicoderm.      Type 2 diabetes mellitus without complication  Patient's FSGs are uncontrolled due to hyperglycemia on current medication regimen.  Last A1c reviewed-   Lab Results   Component Value Date    HGBA1C 8.5 (H) 02/03/2025     Most recent fingerstick glucose reviewed-   Recent Labs   Lab 02/05/25  1700 02/05/25  1950 02/06/25  0621   POCTGLUCOSE 183* 249* 383*       Resume home regime, lantus increased to 22 units       Final Active Diagnoses:    Diagnosis Date Noted POA    PRINCIPAL PROBLEM:  Acute hypercapnic respiratory failure [J96.02] 02/02/2025 Yes    Sinus tachycardia [R00.0] 02/03/2025 Yes    Hypertensive urgency [I16.0] 02/03/2025 No    Opiate use [F11.90] 02/03/2025 Yes    Acute pneumonia [J18.9] 02/02/2025 Yes    PVD (peripheral vascular disease) [I73.9] 02/02/2025 Yes    Medical non-compliance [Z91.199] 09/16/2022 Not Applicable    Type 2 diabetes mellitus without complication [E11.9] 01/10/2020 Yes    Tobacco dependence [F17.200] 01/10/2020 Yes      Problems Resolved During this Admission:       Discharged Condition: good    Disposition: Home or Self Care    Follow Up:   Follow-up Information       Aletha Henson FNP-CATHY Follow up on 2/13/2025.    Specialty: Family Medicine  Why: @ 08:20 am  Contact information:  [address]  [facility] [address]  [facility] [address]  [phone]               Dme, [facility] Follow up.    Specialties: DME Provider, Orthotics  Why: [facility] to provide home oxygen.  Contact information:  [address]  [facility] [address] [phone]                           Patient Instructions:      OXYGEN FOR HOME USE     Order Specific  "Question Answer Comments   Liter Flow 4    Duration Continuous    Qualifying Test Performed at: Activity    Oxygen saturation at rest 90    Oxygen saturation with activity 85    Oxygen saturation with activity on oxygen 92    Portable mode: continuous    Route nasal cannula    Device: home concentrator with portable tanks    Length of need (in months): 3 mos    Patient condition with qualifying saturation Other - List qualifying diagnosis and code    Select a diagnosis & list the code in the comments Pneumonia [059171]    Height: 5' 7" (1.702 m)    Weight: 52.2 kg (115 lb)    Alternative treatment measures have been tried or considered and deemed clinically ineffective. Yes      Ambulatory referral/consult to Outpatient Case Management   Referral Priority: Routine Referral Type: Consultation   Referral Reason: Specialty Services Required   Number of Visits Requested: 1     Ambulatory referral/consult to Smoking Cessation Program   Standing Status: Future   Referral Priority: Routine Referral Type: Consultation   Referral Reason: Specialty Services Required   Requested Specialty: CTTS   Number of Visits Requested: 1       Significant Diagnostic Studies: Labs: CMP   Recent Labs   Lab 02/05/25  0539 02/06/25  0639    132*   K 3.7 4.3   CL 99 91*   CO2 33* 34*   * 374*   BUN 17 21*   CREATININE 0.7 0.8   CALCIUM 8.5* 8.7   PROT 6.2 6.7   ALBUMIN 2.9* 3.2*   BILITOT 0.3 0.3   ALKPHOS 108 108   AST 15 10   ALT 16 14   ANIONGAP 6* 7*    and CBC   Recent Labs   Lab 02/05/25  0539 02/06/25  0639   WBC 12.59 12.31   HGB 11.4* 12.4*   HCT 37.5* 39.1*    409       Pending Diagnostic Studies:       None           Medications:  Reconciled Home Medications:      Medication List        START taking these medications      atorvastatin 10 MG tablet  Commonly known as: LIPITOR  Take 1 tablet (10 mg total) by mouth every evening.     levoFLOXacin 750 MG tablet  Commonly known as: LEVAQUIN  Take 1 tablet (750 mg " "total) by mouth once daily. for 3 days  Start taking on: February 7, 2025     lisinopriL 40 MG tablet  Commonly known as: PRINIVIL,ZESTRIL  Take 1 tablet (40 mg total) by mouth once daily.     nicotine 21 mg/24 hr  Commonly known as: NICODERM CQ  Place 1 patch onto the skin once daily.     nicotine polacrilex 2 MG Lozg  Take 1 lozenge (2 mg total) by mouth as needed (Use in place of cigarettes/Do not exceed 5 pieces per day).     predniSONE 20 MG tablet  Commonly known as: DELTASONE  Take 2 tablets (40 mg total) by mouth once daily. for 5 days     SITagliptin phosphate 100 MG Tab  Commonly known as: JANUVIA  Take 1 tablet (100 mg total) by mouth once daily.            CHANGE how you take these medications      LANTUS SOLOSTAR U-100 INSULIN 100 unit/mL (3 mL) Inpn pen  Generic drug: insulin glargine U-100 (Lantus)  Inject 22 Units into the skin every evening.  What changed: how much to take  Notes to patient: NEW DOSE 22 UNITS. Old dose was 15 units            CONTINUE taking these medications      blood sugar diagnostic Strp  To check BG 3 times daily, to use with insurance preferred meter  Dx E 11.65     blood-glucose meter kit  To check BG 3 times daily, to use with insurance preferred meter DX E 11.65     buprenorphine-naloxone 8-2 mg 8-2 mg  Commonly known as: SUBOXONE  Place 0.5-1 Film under the tongue 3 (three) times daily.     clopidogreL 75 mg tablet  Commonly known as: PLAVIX  Take 1 tablet (75 mg total) by mouth once daily.     DEXCOM G7 SENSOR May  Generic drug: blood-glucose sensor  1 Device by Misc.(Non-Drug; Combo Route) route every 10 days.     lancets [facility]  To check BG 3 times daily, to use with insurance preferred meter Dx E 11.65     pen needle, diabetic 32 gauge x 5/32" Ndle  1 Needle by Misc.(Non-Drug; Combo Route) route 4 (four) times daily.     sildenafiL 25 MG tablet  Commonly known as: VIAGRA  Take 1 tablet (25 mg total) by mouth daily as needed for Erectile Dysfunction.            STOP " taking these medications      gabapentin 300 MG capsule  Commonly known as: NEURONTIN     metFORMIN 500 MG tablet  Commonly known as: GLUCOPHAGE              Indwelling Lines/Drains at time of discharge:   Lines/Drains/Airways       None                   Time spent on the discharge of patient: 40 minutes         Alayna Plunkett MD  Department of Hospital Medicine  [facility]

## 2025-02-06 NOTE — PLAN OF CARE
Patient cleared for discharge by case management to home once portable oxygen tank is delivered to bedside.       02/06/25 1033   Final Note   Assessment Type Final Discharge Note   Anticipated Discharge Disposition Home   Hospital Resources/Appts/Education Provided Appointments scheduled and added to AVS

## 2025-02-06 NOTE — PLAN OF CARE
New order received for home oxygen.     Referral sent to [facility] for review.       02/06/25 0955   Post-Acute Status   Post-Acute Authorization E   [facility] Status Referrals Sent

## 2025-02-06 NOTE — ASSESSMENT & PLAN NOTE
Patient's FSGs are uncontrolled due to hyperglycemia on current medication regimen.  Last A1c reviewed-   Lab Results   Component Value Date    HGBA1C 8.5 (H) 02/03/2025     Most recent fingerstick glucose reviewed-   Recent Labs   Lab 02/05/25  1700 02/05/25  1950 02/06/25  0621   POCTGLUCOSE 183* 249* 383*       Resume home regime, lantus increased to 22 units

## 2025-02-06 NOTE — DISCHARGE INSTRUCTIONS
Patient IV removed with no signs of infection noted. Discharge instructions given. Patient to discharge home with O2.

## 2025-02-06 NOTE — PROGRESS NOTES
Pt has an extensive smoking history. Smokes around 1pk/day since 13 years of age. Patient states the NRT has helped during his stay. Discussed other nicotine cessation medications, as well as outpatient smoking cessation.

## 2025-02-06 NOTE — CARE UPDATE
02/06/25 0912   Home Oxygen Qualification   $ Home O2 Qualification Tech time 15 minutes   Room Air SpO2 At Rest 90 %   Room Air SpO2 During Ambulation (!) 85 %   SpO2 During Ambulation on O2 92 %   Heart Rate on O2 120 bpm   Ambulation O2 LPM 4 LPM   SpO2 Post Ambulation 95 %   Post Ambulation Heart Rate 102 bpm   Post Ambulation O2 LPM 2 LPM   Home O2 Eval Comments pt qualifies for home O2        Progress Notes by Talha Tucker DO at 11/19/18 11:25 AM     Author:  Talha Tucker DO Service:  (none) Author Type:  Physician     Filed:  11/19/18 11:36 AM Encounter Date:  11/19/2018 Status:  Signed     :  Talha Tucker DO (Physician)            Last visit with TALHA TUCKER was on No match found  Last visit with [facility] was on 06/20/2016 at  6:25 PM in [facility]  Match done based on reference date of today 11/19/18  Porfirio is a 10 year old male who presents to walk in care for a week of worsening sinus symptoms.  Complaining of sinus pressure, cough and purulent drainage.  Not having any wheeze, hemoptysis, orthopnea or chest pain. Denies weight loss, recent travel.  Not responding to OTC medications.      Other ROS:  Fevers:[RI1.1T]no[RI1.1M]  Tobacco Use:[RI1.1T]no[RI1.1M]    No past medical history on file.  Allergies:  Review of patient's allergies indicates no known allergies.    OBJECTIVE:  BP 90/64  Pulse 106  Temp 98.2 °F (36.8 °C) (Tympanic)   Resp 20  Wt 82 lb 12.8 oz (37.6 kg)  SpO2 97%  Nontoxic in appearance, normal mental status.  Cranial nerves intact    Ears:  No canal or TM erythema  Conjunctiva- clear  Positive paranasal tenderness  Nasal Mucosa- pale, boggy, purulent rhinorhea noted    Oropharynx- moist mucus membranes, erythematous- some drainage noted  Airways patents  Neck supple, some anterior cervical lymphadenopathy noted.    No meningeal signs    Lungs- some cough, but essentially clear- no wheexing noted  CVS- S1 S2 present, no rubs murmur or gallop noted    Assesement/Plan:    Sinusitis    Discussed this with the patient.  Recommend symptomatic measures, including OTC decongestants and/or antihistamines.    @  No current outpatient prescriptions on file.       Side effects of all medications were discussed.  Patient is instructed to follow up in 2-3 days or as needed.  Patient is to go to the emergency room for any significant change or  worsening.  Patient was discharged in a stable condition and was in agreement with the plan.  No further questions.    Electronically Signed by:    Talha Tucker DO , 11/19/2018[RI1.1T]              Revision History        User Key Date/Time User Provider Type Action    > RI1.1 11/19/18 11:36 AM Talha Tucker DO Physician Sign    M - Manual, T - Template

## 2025-02-06 NOTE — ASSESSMENT & PLAN NOTE
CTA negative for PE   Patient is a smoker   Likely undiagnosed COPD   - resume home breathing Rx  - Added PO Prednisone for 5 days  - Antibiotics changed to Levaquin, complete total 7 days

## 2025-02-06 NOTE — PLAN OF CARE
Problem: Adult Inpatient Plan of Care  Goal: Plan of Care Review  Outcome: Adequate for Care Transition  Goal: Patient-Specific Goal (Individualized)  Outcome: Adequate for Care Transition  Goal: Absence of Hospital-Acquired Illness or Injury  Outcome: Adequate for Care Transition  Goal: Optimal Comfort and Wellbeing  Outcome: Adequate for Care Transition  Goal: Readiness for Transition of Care  Outcome: Adequate for Care Transition     Problem: Diabetes Comorbidity  Goal: Blood Glucose Level Within Targeted Range  Outcome: Adequate for Care Transition     Problem: Pneumonia  Goal: Fluid Balance  Outcome: Adequate for Care Transition  Goal: Resolution of Infection Signs and Symptoms  Outcome: Adequate for Care Transition  Goal: Effective Oxygenation and Ventilation  Outcome: Adequate for Care Transition     Problem: Wound  Goal: Optimal Coping  Outcome: Adequate for Care Transition  Goal: Optimal Functional Ability  Outcome: Adequate for Care Transition  Goal: Absence of Infection Signs and Symptoms  Outcome: Adequate for Care Transition  Goal: Improved Oral Intake  Outcome: Adequate for Care Transition  Goal: Optimal Pain Control and Function  Outcome: Adequate for Care Transition  Goal: Skin Health and Integrity  Outcome: Adequate for Care Transition  Goal: Optimal Wound Healing  Outcome: Adequate for Care Transition

## 2025-02-07 ENCOUNTER — PATIENT OUTREACH (OUTPATIENT)
Dept: ADMINISTRATIVE | Facility: OTHER | Age: 50
End: 2025-02-07
Payer: MEDICAID

## 2025-02-07 LAB
BACTERIA BLD CULT: NORMAL
BACTERIA BLD CULT: NORMAL

## 2025-02-07 NOTE — PROGRESS NOTES
CHW - Outreach Attempt    Community Health Worker left a voicemail message for 1st attempt to contact patient regarding: sdoh  Community Health Worker to attempt to contact patient on: [phone]

## 2025-02-08 LAB
OHS QRS DURATION: 94 MS
OHS QTC CALCULATION: 444 MS

## 2025-02-11 ENCOUNTER — TELEPHONE (OUTPATIENT)
Dept: FAMILY MEDICINE | Facility: CLINIC | Age: 50
End: 2025-02-11
Payer: MEDICAID

## 2025-02-11 NOTE — TELEPHONE ENCOUNTER
----- Message from Nurse Renae sent at 2/7/2025 10:47 AM CST -----  Call patient - needs post-hospital phone call within 2 business days and hospital follow up visit scheduled within 7-14 days.

## 2025-02-11 NOTE — TELEPHONE ENCOUNTER
I called the patient in regards to his [facility] hospital follow up; no answer left voicemail to return our call.  Patient is scheduled for a 3mo fu on 02/13/2025

## 2025-02-13 ENCOUNTER — PATIENT OUTREACH (OUTPATIENT)
Dept: ADMINISTRATIVE | Facility: OTHER | Age: 50
End: 2025-02-13
Payer: MEDICAID

## 2025-02-13 ENCOUNTER — PATIENT MESSAGE (OUTPATIENT)
Dept: ADMINISTRATIVE | Facility: OTHER | Age: 50
End: 2025-02-13
Payer: MEDICAID

## 2025-02-13 NOTE — PROGRESS NOTES
CHW - Initial Contact    This Community Health Worker completed OR updated the Social Determinant of Health questionnaire with patient via telephone today.    Pt identified barriers of most importance are: financial strain   Referrals to community agencies completed with patient/caregiver consent outside of [facility] include: [facility]  Referrals were put through [facility] - no:   Support and Services:   Sergio has already applied fro SSD, waiting. Has food stamps. Wife works and pays the bills, but if in a tight situation, he voiced he will sell things around his house like his guns or electronics to have extra money. Had 2 notices of utility shut off in past but paid bills.     Provided mychart msg with contacts for utility/mortgage assistance.      Other information discussed the patient needs / wants help with: n/a   Follow up required:   No future outreach task assigned

## 2025-02-25 ENCOUNTER — PATIENT MESSAGE (OUTPATIENT)
Dept: ADMINISTRATIVE | Facility: HOSPITAL | Age: 50
End: 2025-02-25
Payer: MEDICAID

## 2025-02-27 ENCOUNTER — TELEPHONE (OUTPATIENT)
Dept: ENDOCRINOLOGY | Facility: CLINIC | Age: 50
End: 2025-02-27
Payer: MEDICAID

## 2025-02-27 NOTE — TELEPHONE ENCOUNTER
----- Message from Rekha sent at 2/27/2025  9:29 AM CST -----  Contact: self  Type:  Sooner Appointment RequestCaller is requesting a sooner appointment.  Caller declined first available appointment listed below.  Caller will not accept being placed on the waitlist and is requesting a message be sent to doctor.Name of Caller:  the patientWhen is the first available appointment?  N/aSymptoms:  dexcomWould the patient rather a call back or a response via [facility]? callBest Call Back Number:  [phone] Information:  pt asking to r/s missed appt from Jan, looking for something in the middle of the day if possible.

## 2025-03-10 ENCOUNTER — PATIENT MESSAGE (OUTPATIENT)
Dept: ADMINISTRATIVE | Facility: HOSPITAL | Age: 50
End: 2025-03-10
Payer: MEDICAID

## 2025-03-14 ENCOUNTER — PATIENT OUTREACH (OUTPATIENT)
Dept: DIABETES | Facility: CLINIC | Age: 50
End: 2025-03-14
Payer: MEDICAID

## 2025-03-14 NOTE — PATIENT INSTRUCTIONS
Returned call to pt who was contacting Educator about F.u appt for DM Education.  Called pt back and left message indicating that no f/uappt has been scheduled but if pt indicates time and/or date, Educator will schedule appt that is convenient for him.  Pt was initially seen 10/2024; current HA1C has improved from 11.4 to 8.5 ..  Pt sees Stacy Diaz NP for Endocrinology.

## 2025-03-24 ENCOUNTER — PATIENT MESSAGE (OUTPATIENT)
Dept: FAMILY MEDICINE | Facility: CLINIC | Age: 50
End: 2025-03-24
Payer: MEDICAID

## 2025-04-07 ENCOUNTER — TELEPHONE (OUTPATIENT)
Dept: SMOKING CESSATION | Facility: CLINIC | Age: 50
End: 2025-04-07
Payer: MEDICAID

## 2025-04-29 ENCOUNTER — CLINICAL SUPPORT (OUTPATIENT)
Dept: SMOKING CESSATION | Facility: CLINIC | Age: 50
End: 2025-04-29
Payer: MEDICAID

## 2025-04-29 DIAGNOSIS — F17.210 MODERATE SMOKER (20 OR LESS PER DAY): Primary | ICD-10-CM

## 2025-04-29 PROCEDURE — 99404 PREV MED CNSL INDIV APPRX 60: CPT | Mod: ,,,

## 2025-04-29 PROCEDURE — 99999 PR PBB SHADOW E&M-EST. PATIENT-LVL II: CPT | Mod: PBBFAC,,,

## 2025-04-29 RX ORDER — IBUPROFEN 200 MG
1 TABLET ORAL DAILY
Qty: 28 PATCH | Refills: 0 | Status: SHIPPED | OUTPATIENT
Start: 2025-04-29

## 2025-05-09 ENCOUNTER — CLINICAL SUPPORT (OUTPATIENT)
Dept: SMOKING CESSATION | Facility: CLINIC | Age: 50
End: 2025-05-09
Payer: MEDICAID

## 2025-05-09 DIAGNOSIS — F17.210 MODERATE SMOKER (20 OR LESS PER DAY): Primary | ICD-10-CM

## 2025-05-09 PROCEDURE — 99404 PREV MED CNSL INDIV APPRX 60: CPT | Mod: S$PBB,,,

## 2025-05-09 PROCEDURE — 99999 PR PBB SHADOW E&M-EST. PATIENT-LVL I: CPT | Mod: PBBFAC,,,

## 2025-05-09 PROCEDURE — 99211 OFF/OP EST MAY X REQ PHY/QHP: CPT | Mod: PBBFAC,PO

## 2025-05-09 NOTE — PROGRESS NOTES
Individual Follow-Up Form    5/9/2025    Clinical Status of Patient: Outpatient    Length of Service: 60 minutes    Continuing Medication: yes  Patches    Other Medications: none     Target Symptoms: Withdrawal and medication side effects. The following were  rated moderate (3) to severe (4) on TCRS:  Moderate (3): none  Severe (4): none    Comments: Patient is smoking the same.  Patient reports using nicotine patches on some days.  We discussed the importance of daily use. We discussed other medication options we could potentially try to aid in cessation.  Patient will rate fade to 12 cpd.  Completion of TCRS (Tobacco Cessation Rating Scale) learned addiction model, cues/triggers, personal reasons/motivation for quitting, willpower, medications, goals, quit date. The patient denies any abnormal behavioral or mental changes at this time. The patient will continue with  therapy sessions and medication monitoring by CTTS. Prescribed medication management will be by physician.       Diagnosis: F17.210    Next Visit: 2 weeks

## 2025-05-23 ENCOUNTER — CLINICAL SUPPORT (OUTPATIENT)
Dept: SMOKING CESSATION | Facility: CLINIC | Age: 50
End: 2025-05-23
Payer: MEDICAID

## 2025-05-23 DIAGNOSIS — F17.210 MODERATE CIGARETTE SMOKER (10-19 PER DAY): Primary | ICD-10-CM

## 2025-05-23 PROCEDURE — 99402 PREV MED CNSL INDIV APPRX 30: CPT | Mod: S$PBB,,,

## 2025-05-23 PROCEDURE — 99212 OFFICE O/P EST SF 10 MIN: CPT | Mod: PBBFAC,PO

## 2025-05-23 PROCEDURE — 99999 PR PBB SHADOW E&M-EST. PATIENT-LVL II: CPT | Mod: PBBFAC,,,

## 2025-05-27 NOTE — PROGRESS NOTES
Individual Follow-Up Form    5/23/25      Clinical Status of Patient: Outpatient    Length of Service: 30 minutes    Continuing Medication: yes  Patches    Other Medications:      Target Symptoms: Withdrawal and medication side effects. The following were  rated moderate (3) to severe (4) on TCRS:  Moderate (3): none  Severe (4): none    Comments: Follow up completed by phone after patient states he could not make clinic appointment. Patient is smoking less than 15 cpd.  Patient will rate fade to 9 cpd or less.  Patient continue to use 21 mg nicotine patch daily and 2 mg nicotine lozenge. We reviewed the importance of medication use and motivations to quit.  We discussed tobacco cessation strategies he could implement to aid in reduction.  Patient remains committed to the quit process.  Completion of TCRS (Tobacco Cessation Rating Scale) learned addiction model, cues/triggers, personal reasons/motivation for quitting, willpower, medications, goals, quit date.  The patient denies any abnormal behavioral or mental changes at this time. The patient will continue with  therapy sessions and medication monitoring by CTTS. Prescribed medication management will be by physician.      Diagnosis: F17.210    Next Visit: 2 weeks

## 2025-06-12 ENCOUNTER — CLINICAL SUPPORT (OUTPATIENT)
Dept: SMOKING CESSATION | Facility: CLINIC | Age: 50
End: 2025-06-12
Payer: MEDICAID

## 2025-06-12 DIAGNOSIS — F17.210 MODERATE CIGARETTE SMOKER (10-19 PER DAY): Primary | ICD-10-CM

## 2025-06-12 PROCEDURE — 99404 PREV MED CNSL INDIV APPRX 60: CPT | Mod: S$PBB,,,

## 2025-06-12 PROCEDURE — 99211 OFF/OP EST MAY X REQ PHY/QHP: CPT | Mod: PBBFAC,PO

## 2025-06-12 PROCEDURE — 99999 PR PBB SHADOW E&M-EST. PATIENT-LVL I: CPT | Mod: PBBFAC,,,

## 2025-06-16 NOTE — PROGRESS NOTES
Individual Follow-Up Form    6/12/25      Clinical Status of Patient: Outpatient    Length of Service: 60 minutes    Continuing Medication: yes  Patches    Other Medications: none     Target Symptoms: Withdrawal and medication side effects. The following were  rated moderate (3) to severe (4) on TCRS:  Moderate (3): none  Severe (4): none    Comments: Patient is smoking 15 cpd down from over 1ppd.  Patient was commended on his success thus far.  Patient remains committed the quitting process. Patient will rate fade to 9 cpd or less.  Completion of TCRS (Tobacco Cessation Rating Scale) reviewed strategies, cues, and triggers. Introduced the negative impact of tobacco on health, the health advantages of discontinuing the use of tobacco, time line improved health changes after a quit, withdrawal issues to expect from nicotine and habit, and ways to achieve the goal of a quit. The patient denies any abnormal behavioral or mental changes at this time. The patient will continue with  therapy sessions and medication monitoring by CTTS. Prescribed medication management will be by physician.           Diagnosis: F17.200    Next Visit: 2 weeks

## 2025-07-07 ENCOUNTER — HOSPITAL ENCOUNTER (EMERGENCY)
Facility: HOSPITAL | Age: 50
Discharge: HOME | End: 2025-07-07
Attending: EMERGENCY MEDICINE
Payer: MEDICAID

## 2025-07-07 VITALS
OXYGEN SATURATION: 95 % | DIASTOLIC BLOOD PRESSURE: 96 MMHG | RESPIRATION RATE: 18 BRPM | BODY MASS INDEX: 15.7 KG/M2 | TEMPERATURE: 98 F | HEIGHT: 67 IN | HEART RATE: 75 BPM | SYSTOLIC BLOOD PRESSURE: 194 MMHG | WEIGHT: 100 LBS

## 2025-07-07 DIAGNOSIS — H53.8 CLOUDY VISION: Primary | ICD-10-CM

## 2025-07-07 DIAGNOSIS — R73.9 HYPERGLYCEMIA: ICD-10-CM

## 2025-07-07 LAB
ABSOLUTE EOSINOPHIL (SMH): 0.09 K/UL
ABSOLUTE MONOCYTE (SMH): 0.66 K/UL (ref 0.3–1)
ABSOLUTE NEUTROPHIL COUNT (SMH): 4.3 K/UL (ref 1.8–7.7)
ALBUMIN SERPL-MCNC: 3.5 G/DL (ref 3.5–5.2)
ALLENS TEST: ABNORMAL
ALP SERPL-CCNC: 103 UNIT/L (ref 40–150)
ALT SERPL-CCNC: 16 UNIT/L (ref 10–44)
ANION GAP (SMH): 11 MMOL/L (ref 8–16)
AST SERPL-CCNC: 28 UNIT/L (ref 11–45)
B-OH-BUTYR BLD STRIP-SCNC: 0.1 MMOL/L
BASOPHILS # BLD AUTO: 0.05 K/UL
BASOPHILS NFR BLD AUTO: 0.7 %
BILIRUB SERPL-MCNC: 0.3 MG/DL (ref 0.1–1)
BILIRUB UR QL STRIP.AUTO: NEGATIVE
BUN SERPL-MCNC: 14 MG/DL (ref 6–20)
CALCIUM SERPL-MCNC: 9.4 MG/DL (ref 8.7–10.5)
CHLORIDE SERPL-SCNC: 99 MMOL/L (ref 95–110)
CLARITY UR: CLEAR
CO2 SERPL-SCNC: 24 MMOL/L (ref 23–29)
COLOR UR AUTO: YELLOW
CREAT SERPL-MCNC: 0.9 MG/DL (ref 0.5–1.4)
DELSYS: ABNORMAL
ERYTHROCYTE [DISTWIDTH] IN BLOOD BY AUTOMATED COUNT: 13.9 % (ref 11.5–14.5)
ERYTHROCYTE [SEDIMENTATION RATE] IN BLOOD BY WESTERGREN METHOD: 20 MM/H
GFR SERPLBLD CREATININE-BSD FMLA CKD-EPI: >60 ML/MIN/1.73/M2
GLUCOSE SERPL-MCNC: 268 MG/DL (ref 70–110)
GLUCOSE SERPL-MCNC: 275 MG/DL (ref 70–110)
GLUCOSE UR QL STRIP: ABNORMAL
HCO3 UR-SCNC: 31.4 MMOL/L (ref 24–28)
HCT VFR BLD AUTO: 43.9 % (ref 40–54)
HCT VFR BLD CALC: 43 %PCV (ref 36–54)
HCV AB SERPL QL IA: NORMAL
HGB BLD-MCNC: 13.9 GM/DL (ref 14–18)
HGB UR QL STRIP: NEGATIVE
HYALINE CASTS UR QL AUTO: 7 /LPF (ref 0–1)
IMM GRANULOCYTES # BLD AUTO: 0.01 K/UL (ref 0–0.04)
IMM GRANULOCYTES NFR BLD AUTO: 0.1 % (ref 0–0.5)
KETONES UR QL STRIP: NEGATIVE
LEUKOCYTE ESTERASE UR QL STRIP: NEGATIVE
LYMPHOCYTES # BLD AUTO: 2.41 K/UL (ref 1–4.8)
MCH RBC QN AUTO: 26.1 PG (ref 27–31)
MCHC RBC AUTO-ENTMCNC: 31.7 G/DL (ref 32–36)
MCV RBC AUTO: 82 FL (ref 82–98)
MICROSCOPIC COMMENT: ABNORMAL
MODE: ABNORMAL
NITRITE UR QL STRIP: NEGATIVE
NUCLEATED RBC (/100WBC) (SMH): 0 /100 WBC
PCO2 BLDA: 46.4 MMHG (ref 35–45)
PH SMN: 7.44 [PH] (ref 7.35–7.45)
PH UR STRIP: 7 [PH]
PLATELET # BLD AUTO: 355 K/UL (ref 150–450)
PMV BLD AUTO: 8.8 FL (ref 9.2–12.9)
PO2 BLDA: 31 MMHG (ref 40–60)
POC BE: 7 MMOL/L (ref -2–2)
POC IONIZED CALCIUM: 1.2 MMOL/L (ref 1.06–1.42)
POC SATURATED O2: 62 % (ref 95–100)
POC TCO2: 33 MMOL/L (ref 24–29)
POCT GLUCOSE: 255 MG/DL (ref 70–110)
POTASSIUM BLD-SCNC: 4.6 MMOL/L (ref 3.5–5.1)
POTASSIUM SERPL-SCNC: 5.1 MMOL/L (ref 3.5–5.1)
PROT SERPL-MCNC: 7.3 GM/DL (ref 6–8.4)
PROT UR QL STRIP: ABNORMAL
RBC # BLD AUTO: 5.33 M/UL (ref 4.6–6.2)
RBC #/AREA URNS AUTO: <1 /HPF
RELATIVE EOSINOPHIL (SMH): 1.2 % (ref 0–8)
RELATIVE LYMPHOCYTE (SMH): 32 % (ref 18–48)
RELATIVE MONOCYTE (SMH): 8.8 % (ref 4–15)
RELATIVE NEUTROPHIL (SMH): 57.2 % (ref 38–73)
SAMPLE: ABNORMAL
SITE: ABNORMAL
SODIUM BLD-SCNC: 136 MMOL/L (ref 136–145)
SODIUM SERPL-SCNC: 134 MMOL/L (ref 136–145)
SP GR UR STRIP: 1.02
SP02: 95
UROBILINOGEN UR STRIP-ACNC: NEGATIVE EU/DL
WBC # BLD AUTO: 7.52 K/UL (ref 3.9–12.7)
WBC #/AREA URNS AUTO: <1 /HPF

## 2025-07-07 PROCEDURE — 25000003 PHARM REV CODE 250: Performed by: EMERGENCY MEDICINE

## 2025-07-07 PROCEDURE — 82010 KETONE BODYS QUAN: CPT | Performed by: EMERGENCY MEDICINE

## 2025-07-07 PROCEDURE — 36415 COLL VENOUS BLD VENIPUNCTURE: CPT | Performed by: EMERGENCY MEDICINE

## 2025-07-07 PROCEDURE — 99900035 HC TECH TIME PER 15 MIN (STAT)

## 2025-07-07 PROCEDURE — 81001 URINALYSIS AUTO W/SCOPE: CPT | Performed by: EMERGENCY MEDICINE

## 2025-07-07 PROCEDURE — 82803 BLOOD GASES ANY COMBINATION: CPT

## 2025-07-07 PROCEDURE — 93010 ELECTROCARDIOGRAM REPORT: CPT | Mod: ,,, | Performed by: GENERAL PRACTICE

## 2025-07-07 PROCEDURE — 82040 ASSAY OF SERUM ALBUMIN: CPT | Performed by: EMERGENCY MEDICINE

## 2025-07-07 PROCEDURE — 86803 HEPATITIS C AB TEST: CPT | Performed by: EMERGENCY MEDICINE

## 2025-07-07 PROCEDURE — 96360 HYDRATION IV INFUSION INIT: CPT

## 2025-07-07 PROCEDURE — 93005 ELECTROCARDIOGRAM TRACING: CPT

## 2025-07-07 PROCEDURE — 82962 GLUCOSE BLOOD TEST: CPT

## 2025-07-07 PROCEDURE — 99284 EMERGENCY DEPT VISIT MOD MDM: CPT | Mod: 25

## 2025-07-07 PROCEDURE — 85025 COMPLETE CBC W/AUTO DIFF WBC: CPT | Performed by: EMERGENCY MEDICINE

## 2025-07-07 RX ADMIN — SODIUM CHLORIDE 1000 ML: 9 INJECTION, SOLUTION INTRAVENOUS at 05:07

## 2025-07-07 NOTE — ED PROVIDER NOTES
Encounter Date: 7/7/2025       History     Chief Complaint   Patient presents with    Fatigue     With decreased appetite, nausea and blurry vision x 1 week; hx DM 2     50-year-old male history of insulin-dependent diabetes presents to the ER with multiple complaints for a week.  He is complaining of blurry vision worse on the right.  States he has an eye doctor and gets regular injections in his eye but does not know the condition for which he gets treatment.  Takes Lantus, 18 units nightly.  Reports compliance with his medication.  Has been using his Dexcom appropriately lately.  States blood sugars as high as 400 over the last few days.  Unclear why according to the patient.  He reports thirst, dry mouth and nausea.  No fevers no chills no vomiting no headache no abdominal pain.    The history is provided by the patient.     Review of patient's allergies indicates:  No Known Allergies  Past Medical History:   Diagnosis Date    COVID-19     around june 2022, per patient/wife    Diabetes mellitus, type 2     Dyslipidemia     Opioid abuse      Past Surgical History:   Procedure Laterality Date    ANGIOGRAM, ABDOMINAL AORTA N/A 1/27/2024    Procedure: Angiogram, Abdominal Aorta;  Surgeon: Bhavesh Pelletier MD;  Location: [facility] CATH/EP LAB;  Service: General;  Laterality: N/A;    ANGIOGRAM, EXTREMITY, BILATERAL Right 1/27/2024    Procedure: ANGIOGRAM, EXTREMITY, BILATERAL;  Surgeon: Bhavesh Pelletier MD;  Location: [facility] CATH/EP LAB;  Service: General;  Laterality: Right;    INCISION AND DRAINAGE OF ABSCESS N/A 9/16/2022    Procedure: INCISION AND DRAINAGE, ABSCESS- BACK;  Surgeon: Glenn Fraser MD;  Location: [facility] OR;  Service: General;  Laterality: N/A;    INTRAVASCULAR ULTRASOUND, NON-CORONARY N/A 1/27/2024    Procedure: Intravascular Ultrasound, Non-Coronary;  Surgeon: Bhavesh Pelletier MD;  Location: [facility] CATH/EP LAB;  Service: General;  Laterality: N/A;    STENTS, ILIAC, BILATERAL Bilateral 1/27/2024     Procedure: Stents, Iliac, Bilateral;  Surgeon: Bhavesh Pelletier MD;  Location: [facility] CATH/EP LAB;  Service: General;  Laterality: Bilateral;     Family History   Problem Relation Name Age of Onset    Diabetes Mother      Hypertension Father      Heart disease Father      Diabetes Father      Diabetes Maternal Grandmother      Diabetes Paternal Grandmother       Social History[1]  Review of Systems   Eyes:  Positive for visual disturbance.   Gastrointestinal:  Positive for nausea.   Endocrine: Positive for polydipsia.   Neurological:  Negative for headaches.       Physical Exam     Initial Vitals [07/07/25 1635]   BP Pulse Resp Temp SpO2   (!) 88/60 95 18 98.3 °F (36.8 °C) 95 %      MAP       --         Physical Exam    Nursing note and vitals reviewed.  Constitutional: He appears well-developed and well-nourished. He is not diaphoretic.  Non-toxic appearance. He does not have a sickly appearance. He does not appear ill. No distress.   Appears older than stated age, no acute distress   HENT:   Head: Normocephalic and atraumatic.   Edentulous   Eyes: EOM are normal.   Neck: Neck supple.   Normal range of motion.  Cardiovascular:  Normal rate, regular rhythm and normal heart sounds.     Exam reveals no gallop and no friction rub.       No murmur heard.  Pulmonary/Chest: Breath sounds normal. No respiratory distress. He has no wheezes. He has no rhonchi. He has no rales.   Abdominal: He exhibits no distension. There is no abdominal tenderness.   Abdomen is soft There is no rebound.   Musculoskeletal:         General: Normal range of motion.      Cervical back: Normal range of motion and neck supple. No rigidity. Normal range of motion.     Neurological: He is alert and oriented to person, place, and time.   Skin: Skin is warm and dry. No rash noted.   Psychiatric: He has a normal mood and affect. His behavior is normal. Judgment and thought content normal.         ED Course   Procedures  Labs Reviewed   HEPATITIS C  ANTIBODY   HEP C VIRUS HOLD SPECIMEN          Imaging Results    None          Medications   sodium chloride 0.9% bolus 1,000 mL 1,000 mL (has no administration in time range)     Medical Decision Making  50-year-old man insulin-dependent diabetic presents with malaise.  For a week.  Decreased appetite, nausea blurry vision.  Gets Q 5 week bilateral ocular injections.  He thinks for diabetic retinopathy.  States his vision in his right eye is getting worse.  No high pain.  No double vision or diplopia.  ER workup demonstrates only hyperglycemia.  He feels better with IV fluids.  Advised him to increase his Lantus back to his previous dose.  Follow up with his ophthalmologist.  No sign at this time of any emergent condition.  Return to the ER for any concerns.    Amount and/or Complexity of Data Reviewed  Labs: ordered. Decision-making details documented in ED Course.               ED Course as of 07/07/25 2057 Mon Jul 07, 2025   1638 BP(!): 88/60 [EF]   1638 Temp: 98.3 °F (36.8 °C) [EF]   1638 Temp Source: Oral [EF]   1638 Pulse: 95 [EF]   1638 Resp: 18 [EF]   1638 SpO2: 95 % [EF]   1721 POC BE(!): 7 [EF]   1728 Beta-Hydroxybutyrate: 0.1 [EF]   1728 WBC: 7.52 [EF]   1728 Hemoglobin(!): 13.9 [EF]   1728 Platelet Count: 355 [EF]   1728 BP(!): 198/102 [EF]   1737 POCT Glucose(!): 255 [EF]   1737 Sinus rhythm 84 beats per minute normal axis no ST elevation or depression or T-wave inversion independently interpreted [EF]   1805 Sodium(!): 134 [EF]   1805 Potassium: 5.1 [EF]   1805 Chloride: 99 [EF]   1805 CO2: 24 [EF]   1805 Glucose(!): 275 [EF]   1805 BUN: 14 [EF]   1805 Creatinine: 0.9 [EF]   1805 Calcium: 9.4 [EF]   1805 PROTEIN TOTAL: 7.3 [EF]   1805 Albumin: 3.5 [EF]   1805 BILIRUBIN TOTAL: 0.3 [EF]   1805 ALP: 103 [EF]   1805 AST: 28 [EF]   1805 ALT: 16 [EF]   1858 Labs unremarkable, f/u eye doc, inc lantus to 22 units (old dose) and see if helps control BGL [EF]      ED Course User Index  [EF] Igor Boogie,  MD                           Clinical Impression:  Final diagnoses:  [R73.9] Hyperglycemia                       [1]   Social History  Tobacco Use    Smoking status: Every Day     Current packs/day: 1.00     Average packs/day: 1 pack/day for 37.5 years (37.0 ttl pk-yrs)     Types: Cigarettes     Start date: 1988     Passive exposure: Current    Smokeless tobacco: Never    Tobacco comments:     On patches   Substance Use Topics    Alcohol use: Not Currently    Drug use: Not Currently     Types: Marijuana, Other-see comments     Comment: pain medications/opioids        Igor Boogie MD  07/07/25 [id number]

## 2025-07-08 ENCOUNTER — TELEPHONE (OUTPATIENT)
Dept: OPHTHALMOLOGY | Facility: CLINIC | Age: 50
End: 2025-07-08
Payer: MEDICAID

## 2025-07-08 LAB — POCT GLUCOSE: 255 MG/DL (ref 70–110)

## 2025-07-08 NOTE — TELEPHONE ENCOUNTER
Copied from CRM #[id number]. Topic: Appointments - Amb Referral  >> Jul 8, 2025  8:19 AM Margaret wrote:  Type:  Sooner Appointment Request    Caller is requesting a sooner appointment.  Caller declined first available appointment listed below.  Caller will not accept being placed on the waitlist and is requesting a message be sent to doctor.    Name of Caller:  Patient  When is the first available appointment?  N/A  Symptoms:  [facility] ED f/u, blurry vision  Would the patient rather a call back or a response via [facility]? Call  Best Call Back Number:  [phone]  Additional Information:  Thank You

## 2025-07-10 LAB — HOLD SPECIMEN: NORMAL

## 2025-07-11 LAB
OHS QRS DURATION: 104 MS
OHS QTC CALCULATION: 441 MS

## 2025-07-16 ENCOUNTER — PATIENT MESSAGE (OUTPATIENT)
Dept: ADMINISTRATIVE | Facility: HOSPITAL | Age: 50
End: 2025-07-16
Payer: MEDICAID

## 2025-07-23 ENCOUNTER — CLINICAL SUPPORT (OUTPATIENT)
Dept: SMOKING CESSATION | Facility: CLINIC | Age: 50
End: 2025-07-23
Payer: MEDICAID

## 2025-07-23 DIAGNOSIS — F17.200 NICOTINE DEPENDENCE: Primary | ICD-10-CM

## 2025-07-23 PROCEDURE — 99406 BEHAV CHNG SMOKING 3-10 MIN: CPT | Mod: S$PBB,,, | Performed by: FAMILY MEDICINE

## 2025-07-23 NOTE — PROGRESS NOTES
Called pt to f/u on his 3 month smoking cessation quit status. Pt stated he is still smoking, but has cut back and continues to work on his quit. Pt is still in program. Informed him of benefit period, phone follow ups, and contact information. Will complete smart form and resolved quit #1 episode per protocol. Completed 3 month smart form for quit #2 and will continue to follow up with patient.

## 2025-08-13 DIAGNOSIS — E11.9 TYPE 2 DIABETES MELLITUS WITHOUT COMPLICATION: ICD-10-CM

## (undated) DEVICE — BLLN MUSTANG 8 X 80 X 75

## (undated) DEVICE — CATH ANGIO SOFT VU 5FR 65CM

## (undated) DEVICE — SHEATH GUID FLX ANSEL 6FR 45CM

## (undated) DEVICE — KIT MICROINTRODUCE MINI 5X10CM

## (undated) DEVICE — SHEATH INTRODUCER 6FR 11CM

## (undated) DEVICE — CATH EAGLE EYE PLATINUM

## (undated) DEVICE — Device

## (undated) DEVICE — GUIDEWIRE COMMAND .014X300CM

## (undated) DEVICE — INFLATOR ADVANTAGE ENCORE 26

## (undated) DEVICE — CATH VISIONS PV IVUS .035

## (undated) DEVICE — SHEATH INTRODUCER 5FR 10CM

## (undated) DEVICE — BLLN MUSTANG 7X80X75